# Patient Record
Sex: MALE | Race: OTHER | URBAN - METROPOLITAN AREA
[De-identification: names, ages, dates, MRNs, and addresses within clinical notes are randomized per-mention and may not be internally consistent; named-entity substitution may affect disease eponyms.]

---

## 2019-12-18 PROBLEM — Z00.00 ENCOUNTER FOR PREVENTIVE HEALTH EXAMINATION: Status: ACTIVE | Noted: 2019-12-18

## 2024-03-08 ENCOUNTER — INPATIENT (INPATIENT)
Facility: HOSPITAL | Age: 74
LOS: 3 days | Discharge: HOME | DRG: 723 | End: 2024-03-12
Attending: HOSPITALIST | Admitting: INTERNAL MEDICINE
Payer: MEDICARE

## 2024-03-08 VITALS
OXYGEN SATURATION: 99 % | HEIGHT: 67 IN | WEIGHT: 147.05 LBS | HEART RATE: 75 BPM | RESPIRATION RATE: 18 BRPM | DIASTOLIC BLOOD PRESSURE: 87 MMHG | TEMPERATURE: 98 F | SYSTOLIC BLOOD PRESSURE: 181 MMHG

## 2024-03-08 PROCEDURE — 74177 CT ABD & PELVIS W/CONTRAST: CPT | Mod: 26,MC

## 2024-03-08 PROCEDURE — 99285 EMERGENCY DEPT VISIT HI MDM: CPT

## 2024-03-08 RX ORDER — SODIUM CHLORIDE 9 MG/ML
1000 INJECTION INTRAMUSCULAR; INTRAVENOUS; SUBCUTANEOUS ONCE
Refills: 0 | Status: COMPLETED | OUTPATIENT
Start: 2024-03-08 | End: 2024-03-08

## 2024-03-08 RX ADMIN — SODIUM CHLORIDE 1000 MILLILITER(S): 9 INJECTION INTRAMUSCULAR; INTRAVENOUS; SUBCUTANEOUS at 23:14

## 2024-03-08 NOTE — ED ADULT NURSE NOTE - NSFALLUNIVINTERV_ED_ALL_ED
Bed/Stretcher in lowest position, wheels locked, appropriate side rails in place/Call bell, personal items and telephone in reach/Instruct patient to call for assistance before getting out of bed/chair/stretcher/Non-slip footwear applied when patient is off stretcher/Nettie to call system/Physically safe environment - no spills, clutter or unnecessary equipment/Purposeful proactive rounding/Room/bathroom lighting operational, light cord in reach

## 2024-03-09 DIAGNOSIS — C61 MALIGNANT NEOPLASM OF PROSTATE: ICD-10-CM

## 2024-03-09 LAB
ALBUMIN SERPL ELPH-MCNC: 3.9 G/DL — SIGNIFICANT CHANGE UP (ref 3.5–5.2)
ALBUMIN SERPL ELPH-MCNC: 4.5 G/DL — SIGNIFICANT CHANGE UP (ref 3.5–5.2)
ALP SERPL-CCNC: 565 U/L — HIGH (ref 30–115)
ALP SERPL-CCNC: 626 U/L — HIGH (ref 30–115)
ALT FLD-CCNC: 17 U/L — SIGNIFICANT CHANGE UP (ref 0–41)
ALT FLD-CCNC: 22 U/L — SIGNIFICANT CHANGE UP (ref 0–41)
ANION GAP SERPL CALC-SCNC: 12 MMOL/L — SIGNIFICANT CHANGE UP (ref 7–14)
ANION GAP SERPL CALC-SCNC: 16 MMOL/L — HIGH (ref 7–14)
APPEARANCE UR: CLEAR — SIGNIFICANT CHANGE UP
AST SERPL-CCNC: 17 U/L — SIGNIFICANT CHANGE UP (ref 0–41)
AST SERPL-CCNC: 21 U/L — SIGNIFICANT CHANGE UP (ref 0–41)
BASOPHILS # BLD AUTO: 0.03 K/UL — SIGNIFICANT CHANGE UP (ref 0–0.2)
BASOPHILS # BLD AUTO: 0.05 K/UL — SIGNIFICANT CHANGE UP (ref 0–0.2)
BASOPHILS NFR BLD AUTO: 0.2 % — SIGNIFICANT CHANGE UP (ref 0–1)
BASOPHILS NFR BLD AUTO: 0.3 % — SIGNIFICANT CHANGE UP (ref 0–1)
BILIRUB SERPL-MCNC: 0.6 MG/DL — SIGNIFICANT CHANGE UP (ref 0.2–1.2)
BILIRUB SERPL-MCNC: 0.7 MG/DL — SIGNIFICANT CHANGE UP (ref 0.2–1.2)
BILIRUB UR-MCNC: NEGATIVE — SIGNIFICANT CHANGE UP
BUN SERPL-MCNC: 29 MG/DL — HIGH (ref 10–20)
BUN SERPL-MCNC: 30 MG/DL — HIGH (ref 10–20)
CALCIUM SERPL-MCNC: 8.6 MG/DL — SIGNIFICANT CHANGE UP (ref 8.4–10.4)
CALCIUM SERPL-MCNC: 9 MG/DL — SIGNIFICANT CHANGE UP (ref 8.4–10.5)
CHLORIDE SERPL-SCNC: 104 MMOL/L — SIGNIFICANT CHANGE UP (ref 98–110)
CHLORIDE SERPL-SCNC: 108 MMOL/L — SIGNIFICANT CHANGE UP (ref 98–110)
CO2 SERPL-SCNC: 17 MMOL/L — SIGNIFICANT CHANGE UP (ref 17–32)
CO2 SERPL-SCNC: 19 MMOL/L — SIGNIFICANT CHANGE UP (ref 17–32)
COLOR SPEC: YELLOW — SIGNIFICANT CHANGE UP
CREAT SERPL-MCNC: 1.9 MG/DL — HIGH (ref 0.7–1.5)
CREAT SERPL-MCNC: 2.2 MG/DL — HIGH (ref 0.7–1.5)
DIFF PNL FLD: NEGATIVE — SIGNIFICANT CHANGE UP
EGFR: 31 ML/MIN/1.73M2 — LOW
EGFR: 37 ML/MIN/1.73M2 — LOW
EOSINOPHIL # BLD AUTO: 0 K/UL — SIGNIFICANT CHANGE UP (ref 0–0.7)
EOSINOPHIL # BLD AUTO: 0.01 K/UL — SIGNIFICANT CHANGE UP (ref 0–0.7)
EOSINOPHIL NFR BLD AUTO: 0 % — SIGNIFICANT CHANGE UP (ref 0–8)
EOSINOPHIL NFR BLD AUTO: 0.1 % — SIGNIFICANT CHANGE UP (ref 0–8)
GLUCOSE SERPL-MCNC: 106 MG/DL — HIGH (ref 70–99)
GLUCOSE SERPL-MCNC: 123 MG/DL — HIGH (ref 70–99)
GLUCOSE UR QL: NEGATIVE MG/DL — SIGNIFICANT CHANGE UP
HCT VFR BLD CALC: 31.2 % — LOW (ref 42–52)
HCT VFR BLD CALC: 32.9 % — LOW (ref 42–52)
HGB BLD-MCNC: 10.6 G/DL — LOW (ref 14–18)
HGB BLD-MCNC: 11.2 G/DL — LOW (ref 14–18)
IMM GRANULOCYTES NFR BLD AUTO: 0.8 % — HIGH (ref 0.1–0.3)
IMM GRANULOCYTES NFR BLD AUTO: 1 % — HIGH (ref 0.1–0.3)
KETONES UR-MCNC: NEGATIVE MG/DL — SIGNIFICANT CHANGE UP
LEUKOCYTE ESTERASE UR-ACNC: NEGATIVE — SIGNIFICANT CHANGE UP
LIDOCAIN IGE QN: 18 U/L — SIGNIFICANT CHANGE UP (ref 7–60)
LYMPHOCYTES # BLD AUTO: 1.05 K/UL — LOW (ref 1.2–3.4)
LYMPHOCYTES # BLD AUTO: 1.06 K/UL — LOW (ref 1.2–3.4)
LYMPHOCYTES # BLD AUTO: 6.8 % — LOW (ref 20.5–51.1)
LYMPHOCYTES # BLD AUTO: 8.1 % — LOW (ref 20.5–51.1)
MAGNESIUM SERPL-MCNC: 2.1 MG/DL — SIGNIFICANT CHANGE UP (ref 1.8–2.4)
MCHC RBC-ENTMCNC: 30.3 PG — SIGNIFICANT CHANGE UP (ref 27–31)
MCHC RBC-ENTMCNC: 30.6 PG — SIGNIFICANT CHANGE UP (ref 27–31)
MCHC RBC-ENTMCNC: 34 G/DL — SIGNIFICANT CHANGE UP (ref 32–37)
MCHC RBC-ENTMCNC: 34 G/DL — SIGNIFICANT CHANGE UP (ref 32–37)
MCV RBC AUTO: 88.9 FL — SIGNIFICANT CHANGE UP (ref 80–94)
MCV RBC AUTO: 90.2 FL — SIGNIFICANT CHANGE UP (ref 80–94)
MONOCYTES # BLD AUTO: 0.92 K/UL — HIGH (ref 0.1–0.6)
MONOCYTES # BLD AUTO: 1.04 K/UL — HIGH (ref 0.1–0.6)
MONOCYTES NFR BLD AUTO: 6 % — SIGNIFICANT CHANGE UP (ref 1.7–9.3)
MONOCYTES NFR BLD AUTO: 7.9 % — SIGNIFICANT CHANGE UP (ref 1.7–9.3)
NEUTROPHILS # BLD AUTO: 10.87 K/UL — HIGH (ref 1.4–6.5)
NEUTROPHILS # BLD AUTO: 13.17 K/UL — HIGH (ref 1.4–6.5)
NEUTROPHILS NFR BLD AUTO: 82.9 % — HIGH (ref 42.2–75.2)
NEUTROPHILS NFR BLD AUTO: 85.9 % — HIGH (ref 42.2–75.2)
NITRITE UR-MCNC: NEGATIVE — SIGNIFICANT CHANGE UP
NRBC # BLD: 0 /100 WBCS — SIGNIFICANT CHANGE UP (ref 0–0)
NRBC # BLD: 0 /100 WBCS — SIGNIFICANT CHANGE UP (ref 0–0)
PH UR: 5.5 — SIGNIFICANT CHANGE UP (ref 5–8)
PHOSPHATE SERPL-MCNC: 4.7 MG/DL — SIGNIFICANT CHANGE UP (ref 2.1–4.9)
PLATELET # BLD AUTO: 213 K/UL — SIGNIFICANT CHANGE UP (ref 130–400)
PLATELET # BLD AUTO: 262 K/UL — SIGNIFICANT CHANGE UP (ref 130–400)
PMV BLD: 10.3 FL — SIGNIFICANT CHANGE UP (ref 7.4–10.4)
PMV BLD: 9.8 FL — SIGNIFICANT CHANGE UP (ref 7.4–10.4)
POTASSIUM SERPL-MCNC: 4.1 MMOL/L — SIGNIFICANT CHANGE UP (ref 3.5–5)
POTASSIUM SERPL-MCNC: 4.5 MMOL/L — SIGNIFICANT CHANGE UP (ref 3.5–5)
POTASSIUM SERPL-SCNC: 4.1 MMOL/L — SIGNIFICANT CHANGE UP (ref 3.5–5)
POTASSIUM SERPL-SCNC: 4.5 MMOL/L — SIGNIFICANT CHANGE UP (ref 3.5–5)
PROT SERPL-MCNC: 6.1 G/DL — SIGNIFICANT CHANGE UP (ref 6–8)
PROT SERPL-MCNC: 7.2 G/DL — SIGNIFICANT CHANGE UP (ref 6–8)
PROT UR-MCNC: SIGNIFICANT CHANGE UP MG/DL
PSA FREE FLD-MCNC: 2.38 NG/ML — SIGNIFICANT CHANGE UP
PSA FREE FLD-MCNC: 24 % — SIGNIFICANT CHANGE UP
PSA SERPL-MCNC: 9.73 NG/ML — HIGH (ref 0–4)
RBC # BLD: 3.46 M/UL — LOW (ref 4.7–6.1)
RBC # BLD: 3.7 M/UL — LOW (ref 4.7–6.1)
RBC # FLD: 12.6 % — SIGNIFICANT CHANGE UP (ref 11.5–14.5)
RBC # FLD: 12.8 % — SIGNIFICANT CHANGE UP (ref 11.5–14.5)
SODIUM SERPL-SCNC: 137 MMOL/L — SIGNIFICANT CHANGE UP (ref 135–146)
SODIUM SERPL-SCNC: 139 MMOL/L — SIGNIFICANT CHANGE UP (ref 135–146)
SP GR SPEC: 1.02 — SIGNIFICANT CHANGE UP (ref 1–1.03)
UROBILINOGEN FLD QL: 0.2 MG/DL — SIGNIFICANT CHANGE UP (ref 0.2–1)
WBC # BLD: 13.11 K/UL — HIGH (ref 4.8–10.8)
WBC # BLD: 15.34 K/UL — HIGH (ref 4.8–10.8)
WBC # FLD AUTO: 13.11 K/UL — HIGH (ref 4.8–10.8)
WBC # FLD AUTO: 15.34 K/UL — HIGH (ref 4.8–10.8)

## 2024-03-09 PROCEDURE — 76775 US EXAM ABDO BACK WALL LIM: CPT

## 2024-03-09 PROCEDURE — 86901 BLOOD TYPING SEROLOGIC RH(D): CPT

## 2024-03-09 PROCEDURE — 99497 ADVNCD CARE PLAN 30 MIN: CPT | Mod: 25

## 2024-03-09 PROCEDURE — 76775 US EXAM ABDO BACK WALL LIM: CPT | Mod: 26

## 2024-03-09 PROCEDURE — 97161 PT EVAL LOW COMPLEX 20 MIN: CPT | Mod: GP

## 2024-03-09 PROCEDURE — 84100 ASSAY OF PHOSPHORUS: CPT

## 2024-03-09 PROCEDURE — 78803 RP LOCLZJ TUM SPECT 1 AREA: CPT

## 2024-03-09 PROCEDURE — 85610 PROTHROMBIN TIME: CPT

## 2024-03-09 PROCEDURE — 85730 THROMBOPLASTIN TIME PARTIAL: CPT

## 2024-03-09 PROCEDURE — 97110 THERAPEUTIC EXERCISES: CPT | Mod: GP

## 2024-03-09 PROCEDURE — 85025 COMPLETE CBC W/AUTO DIFF WBC: CPT

## 2024-03-09 PROCEDURE — 76770 US EXAM ABDO BACK WALL COMP: CPT

## 2024-03-09 PROCEDURE — 99222 1ST HOSP IP/OBS MODERATE 55: CPT | Mod: 25

## 2024-03-09 PROCEDURE — 99223 1ST HOSP IP/OBS HIGH 75: CPT

## 2024-03-09 PROCEDURE — 36415 COLL VENOUS BLD VENIPUNCTURE: CPT

## 2024-03-09 PROCEDURE — 86803 HEPATITIS C AB TEST: CPT

## 2024-03-09 PROCEDURE — 86850 RBC ANTIBODY SCREEN: CPT

## 2024-03-09 PROCEDURE — A9503: CPT

## 2024-03-09 PROCEDURE — 84154 ASSAY OF PSA FREE: CPT

## 2024-03-09 PROCEDURE — 78306 BONE IMAGING WHOLE BODY: CPT | Mod: MC

## 2024-03-09 PROCEDURE — 86900 BLOOD TYPING SEROLOGIC ABO: CPT

## 2024-03-09 PROCEDURE — 84153 ASSAY OF PSA TOTAL: CPT

## 2024-03-09 PROCEDURE — 80053 COMPREHEN METABOLIC PANEL: CPT

## 2024-03-09 PROCEDURE — 83735 ASSAY OF MAGNESIUM: CPT

## 2024-03-09 PROCEDURE — 97116 GAIT TRAINING THERAPY: CPT | Mod: GP

## 2024-03-09 RX ORDER — ACETAMINOPHEN 500 MG
650 TABLET ORAL EVERY 6 HOURS
Refills: 0 | Status: DISCONTINUED | OUTPATIENT
Start: 2024-03-09 | End: 2024-03-12

## 2024-03-09 RX ORDER — ONDANSETRON 8 MG/1
4 TABLET, FILM COATED ORAL EVERY 8 HOURS
Refills: 0 | Status: DISCONTINUED | OUTPATIENT
Start: 2024-03-09 | End: 2024-03-12

## 2024-03-09 RX ORDER — PANTOPRAZOLE SODIUM 20 MG/1
40 TABLET, DELAYED RELEASE ORAL
Refills: 0 | Status: DISCONTINUED | OUTPATIENT
Start: 2024-03-09 | End: 2024-03-12

## 2024-03-09 RX ORDER — HEPARIN SODIUM 5000 [USP'U]/ML
5000 INJECTION INTRAVENOUS; SUBCUTANEOUS EVERY 12 HOURS
Refills: 0 | Status: DISCONTINUED | OUTPATIENT
Start: 2024-03-09 | End: 2024-03-10

## 2024-03-09 RX ORDER — SENNA PLUS 8.6 MG/1
2 TABLET ORAL AT BEDTIME
Refills: 0 | Status: DISCONTINUED | OUTPATIENT
Start: 2024-03-09 | End: 2024-03-12

## 2024-03-09 RX ORDER — POLYETHYLENE GLYCOL 3350 17 G/17G
17 POWDER, FOR SOLUTION ORAL
Refills: 0 | Status: DISCONTINUED | OUTPATIENT
Start: 2024-03-09 | End: 2024-03-12

## 2024-03-09 RX ORDER — LACTULOSE 10 G/15ML
20 SOLUTION ORAL
Refills: 0 | Status: COMPLETED | OUTPATIENT
Start: 2024-03-09 | End: 2024-03-09

## 2024-03-09 RX ORDER — ATORVASTATIN CALCIUM 80 MG/1
20 TABLET, FILM COATED ORAL AT BEDTIME
Refills: 0 | Status: DISCONTINUED | OUTPATIENT
Start: 2024-03-09 | End: 2024-03-12

## 2024-03-09 RX ORDER — FINASTERIDE 5 MG/1
5 TABLET, FILM COATED ORAL DAILY
Refills: 0 | Status: DISCONTINUED | OUTPATIENT
Start: 2024-03-09 | End: 2024-03-12

## 2024-03-09 RX ORDER — KETOROLAC TROMETHAMINE 30 MG/ML
15 SYRINGE (ML) INJECTION ONCE
Refills: 0 | Status: DISCONTINUED | OUTPATIENT
Start: 2024-03-09 | End: 2024-03-09

## 2024-03-09 RX ORDER — LANOLIN ALCOHOL/MO/W.PET/CERES
3 CREAM (GRAM) TOPICAL AT BEDTIME
Refills: 0 | Status: DISCONTINUED | OUTPATIENT
Start: 2024-03-09 | End: 2024-03-12

## 2024-03-09 RX ORDER — LOSARTAN POTASSIUM 100 MG/1
100 TABLET, FILM COATED ORAL DAILY
Refills: 0 | Status: DISCONTINUED | OUTPATIENT
Start: 2024-03-09 | End: 2024-03-11

## 2024-03-09 RX ORDER — SODIUM CHLORIDE 9 MG/ML
1000 INJECTION, SOLUTION INTRAVENOUS
Refills: 0 | Status: DISCONTINUED | OUTPATIENT
Start: 2024-03-09 | End: 2024-03-12

## 2024-03-09 RX ADMIN — HEPARIN SODIUM 5000 UNIT(S): 5000 INJECTION INTRAVENOUS; SUBCUTANEOUS at 17:21

## 2024-03-09 RX ADMIN — ATORVASTATIN CALCIUM 20 MILLIGRAM(S): 80 TABLET, FILM COATED ORAL at 21:50

## 2024-03-09 RX ADMIN — Medication 15 MILLIGRAM(S): at 02:52

## 2024-03-09 RX ADMIN — Medication 20 MILLIGRAM(S): at 02:51

## 2024-03-09 RX ADMIN — SODIUM CHLORIDE 75 MILLILITER(S): 9 INJECTION, SOLUTION INTRAVENOUS at 11:41

## 2024-03-09 RX ADMIN — FINASTERIDE 5 MILLIGRAM(S): 5 TABLET, FILM COATED ORAL at 11:38

## 2024-03-09 NOTE — ED PROVIDER NOTE - CLINICAL SUMMARY MEDICAL DECISION MAKING FREE TEXT BOX
Patient evaluated for abdominal pain and constipation, found to have prostate mass on CT imaging, labs remarkable for JIGNESH, patient admitted for further evaluation of treatment of new mass in setting JIGNESH.

## 2024-03-09 NOTE — ED PROVIDER NOTE - PHYSICAL EXAMINATION
CONST: well appearing for age  HEAD:  normocephalic, atraumatic  EYES:  conjunctivae without injection, drainage or discharge  ENMT:   nasal mucosa moist; mouth moist without ulcerations or lesions; throat moist without erythema, exudate, ulcerations or lesions  NECK:  supple  CARDIAC:  regular rate and rhythm, normal S1 and S2, no murmurs, rubs or gallops  RESP:  respiratory rate and effort appear normal for age; lungs are clear to auscultation bilaterally; no rales or wheezes  ABDOMEN:  soft, mild lower abdominal tenderness  MUSCULOSKELETAL/NEURO:  awake and alert, HART, normal movement, normal tone  SKIN:  normal skin color for age and race, well-perfused; warm and dry

## 2024-03-09 NOTE — PROGRESS NOTE ADULT - SUBJECTIVE AND OBJECTIVE BOX
INTERVENTIONAL RADIOLOGY CONSULT:     Procedure Requested:  Prostate Biopsy    HPI:  Pt is a 72yo Male with pmh of BPH (on finasteride), HTN, HLD presented to the ER with lower abdominal pain and constipation for the past few days. The patient follows with Urologist Dr. Perez and private Oncologist in [place]. The patient mentioned that he is not aware of the prostate cancer diagnosis and was being managed for BPH by urology. He mentioned that he had a bladder stone removal in 2021 and had colonoscopy recently in Feb which showed 5 polyps. The patient has increase in urinary frequency and nocturia but denies any dysuria and attributes it to Finasteride. He has FHx ( 2 brothers) of prostate cancer. The patient mentioned that he would like to see his private oncologist rather than get treatment with oncology team here. The patient has been making urine and Hensley was in ED as requested by urology and has mild hematuria (likely due to traumatic Hensley).    Pt denies history of smoking. Pt denies fever, chills, N/V, suprapubic pain, flank pain, dysuria,    Vital Signs Last 24 Hrs  T(C): 36.4 (09 Mar 2024 07:38), Max: 36.4 (08 Mar 2024 21:56)  T(F): 97.6 (09 Mar 2024 07:38), Max: 97.6 (08 Mar 2024 21:56)  HR: 76 (09 Mar 2024 07:38) (72 - 78)  BP: 127/71 (09 Mar 2024 07:38) (125/59 - 181/87)--  RR: 18 (09 Mar 2024 07:38) (18 - 18)  SpO2: 99% (09 Mar 2024 07:38) (99% - 99%)  O2 Parameters below as of 09 Mar 2024 07:38  Patient On (Oxygen Delivery Method): room air      (09 Mar 2024 09:32)      PAST MEDICAL & SURGICAL HISTORY:  Prostate cancer      No significant past surgical history      MEDICATIONS  (STANDING):  atorvastatin 20 milliGRAM(s) Oral at bedtime  finasteride 5 milliGRAM(s) Oral daily  heparin   Injectable 5000 Unit(s) SubCutaneous every 12 hours  lactated ringers. 1000 milliLiter(s) (75 mL/Hr) IV Continuous <Continuous>  losartan 100 milliGRAM(s) Oral daily  pantoprazole    Tablet 40 milliGRAM(s) Oral before breakfast  polyethylene glycol 3350 17 Gram(s) Oral two times a day  senna 2 Tablet(s) Oral at bedtime    MEDICATIONS  (PRN):  acetaminophen     Tablet .. 650 milliGRAM(s) Oral every 6 hours PRN Temp greater or equal to 38C (100.4F), Mild Pain (1 - 3)  aluminum hydroxide/magnesium hydroxide/simethicone Suspension 30 milliLiter(s) Oral every 4 hours PRN Dyspepsia  melatonin 3 milliGRAM(s) Oral at bedtime PRN Insomnia  ondansetron Injectable 4 milliGRAM(s) IV Push every 8 hours PRN Nausea and/or Vomiting      Allergies    Allergy Status Unknown    Intolerances        Social History:   Smoking: Yes [ ]  No [ ]   ______pk yrs  ETOH  Yes [ ]  No [ ]  Social [ ]  DRUGS:  Yes [ ]  No [ ]  if so what______________    FAMILY HISTORY:  No pertinent family history in first degree relatives        Physical Exam:   Vital Signs Last 24 Hrs  T(C): 36.9 (09 Mar 2024 15:41), Max: 36.9 (09 Mar 2024 15:41)  T(F): 98.4 (09 Mar 2024 15:41), Max: 98.4 (09 Mar 2024 15:41)  HR: 92 (09 Mar 2024 15:41) (72 - 92)  BP: 100/56 (09 Mar 2024 15:41) (100/56 - 181/87)  BP(mean): 78 (09 Mar 2024 15:41) (78 - 78)  RR: 18 (09 Mar 2024 15:41) (18 - 18)  SpO2: 97% (09 Mar 2024 15:41) (97% - 99%)    Parameters below as of 09 Mar 2024 07:38  Patient On (Oxygen Delivery Method): room air      General:     Lungs:    Cardiovascular:     Abdomen:    Extremities:    Musculoskeletal:    Neuro/Psych:        Labs:                         10.6   13.11 )-----------( 213      ( 09 Mar 2024 07:04 )             31.2     03-09    139  |  108  |  30<H>  ----------------------------<  106<H>  4.5   |  19  |  2.2<H>    Ca    8.6      09 Mar 2024 07:04  Phos  4.7     03-09  Mg     2.1     03-09    TPro  6.1  /  Alb  3.9  /  TBili  0.6  /  DBili  x   /  AST  17  /  ALT  17  /  AlkPhos  565<H>  03-09      Pertinent labs:                      10.6   13.11 )-----------( 213      ( 09 Mar 2024 07:04 )             31.2       03-09    139  |  108  |  30<H>  ----------------------------<  106<H>  4.5   |  19  |  2.2<H>    Ca    8.6      09 Mar 2024 07:04  Phos  4.7     03-09  Mg     2.1     03-09    TPro  6.1  /  Alb  3.9  /  TBili  0.6  /  DBili  x   /  AST  17  /  ALT  17  /  AlkPhos  565<H>  03-09        Radiology & Additional Studies:  CT A/P (3/8/2024)     Radiology imaging reviewed.       ASSESSMENT/ PLAN:   72 yo male with above history.  IR consulted by Medicine team for prostate biopsy.  Imaging  results as follows:    IMPRESSION:    1. Left lateral urinary bladder wall masslike thickening, contiguous with prostate, with extravesicular tumor infiltration. Mass at region of left UVJ, with resultant moderate left hydroureteronephrosis. Urinary bladder distended. Right mild hydronephrosis or fullness. BPH with median lobe hypertrophy. 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 Findings compatible with metastatic neoplasm; prostate cancer invading bladder favored over primary urinary bladder neoplasm given suspicious osteoblastic metastatic bone disease.. Correlation with PSA recommended.    2. Suspicious pelvic lymphadenopathy, catalogued above.    3. Mild colonic stool burden. No bowel obstruction. Relation of prostate gland and rectum limited by modality.    --- End of Report ---      Case d/w Jenifer Wellington via telephone early this afternoon via Teams telephone.  Please reconsult urology team if prostate biopsy is desired.  The pre-sacral and iliac lymphadenopathy described is not amenable to percutaneous biopsy due to lack of safe percutaneous window.  Alternatively, if one of the sclerotic bone lesions, ie) the left sacrum is desired, IR could potentially biopsy one of these lesions.        Thank you for the courtesy of this consult, please call [phone] with any further questions.

## 2024-03-09 NOTE — H&P ADULT - LOCATION OF DISCUSSION
[Dear  ___] : Dear  [unfilled], [Attached please find my note.] : Attached please find my note. Face to face

## 2024-03-09 NOTE — ED PROVIDER NOTE - CARE PLAN
Principal Discharge DX:	Primary prostate malignancy  Secondary Diagnosis:	JIGNESH (acute kidney injury)   1

## 2024-03-09 NOTE — ED PROVIDER NOTE - ATTENDING CONTRIBUTION TO CARE
73-year-old male with PMH HTN, HLD, BPH presents for evaluation of abdominal pain and constipation for several days.  + urinary frequency. Denies any dysuria, hematuria or flank pain.  No fevers or chills, vomiting or diarrhea.    VITAL SIGNS: noted  CONSTITUTIONAL: Well-developed; well-nourished; in no acute distress  HEAD: Normocephalic; atraumatic  EYES: PERRL, EOM intact; conjunctiva and sclera clear  ENT: No nasal discharge; airway clear. MMM  NECK: Supple; non tender.    CARD: S1, S2 normal; no murmurs, gallops, or rubs. Regular rate and rhythm  RESP: CTAB/L, no wheezes, rales or rhonchi  ABD: Normal bowel sounds; soft; non-distended; mild abdominal tenderness, no rebound or guarding; no CVA tenderness  EXT: Normal ROM. No calf tenderness or edema. Distal pulses intact  NEURO: Alert, oriented. Grossly unremarkable. No focal deficits  SKIN: Skin exam is warm and dry

## 2024-03-09 NOTE — H&P ADULT - ASSESSMENT
Left lateral urinary bladder wall masslike thickening, contiguous with   prostate, with extravesicular tumor infiltration. Mass at region of left   UVJ, with resultant moderate left hydroureteronephrosis. Urinary bladder   distended. Right mild hydronephrosis or fullness. BPH with median lobe   hypertrophy. Additional findings suspicious for osteoblastic metastatic   bone disease. Findings compatible with metastatic neoplasm; prostate   cancer invading bladder favored over primary urinary bladder neoplasm   given suspicious osteoblastic metastatic bone disease.. Correlation with   PSA recommended.    2. Suspicious pelvic lymphadenopathy, catalogued above.    3. Mild colonic stool burden. No bowel obstruction. Relation of prostate   gland and rectum limited by modality.        #DVT PPx- heparin  #GI PPx-protonix  #Diet- DASH  #CHG  #Activity- AAT  #Dispo- Floor     Pt is a 74yo Male with pmh of BPH (on finasteride), HTN, HLD presented to the ER with lower abdominal pain and constipation for the past few days. The patient follows with Urologist Dr. Perez and private Oncologist in [place]. The patient mentioned that he is not aware of the prostate cancer diagnosis and was being managed for BPH by urology. He mentioned that he had a bladder stone removal in 2021 and had colonoscopy recently in Feb which showed 5 polyps. The patient has increase in urinary frequency and nocturia but denies any dysuria and attributes it to Finasteride. He has FHx ( 2 brothers) of prostate cancer. The patient mentioned that he would like to see his private oncologist rather than get treatment with oncology team here. The patient has been making urine and Hensley was in ED as requested by urology and has mild hematuria (likely due to traumatic Hensley).      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 Larry Perez  - urology recs appreciated  - Hensley in place, 450 cc UO with hematuria  - monitor for creatinine, 1.9>>2.2  - Billie phos elevated 526 due to bone mets  - Hg 10, keep type and screen active  - monitor for now if renal function going down, consider IR for JJ stent  - CT A/P: Left lateral urinary bladder wall masslike thickening, contiguous with prostate, with extravesicular tumor infiltration. Mass at region of left UVJ, with resultant moderate left hydroureteronephrosis. Urinary bladder distended. Right mild hydronephrosis or fullness. BPH with median lobe hypertrophy. 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 Findings compatible with metastatic neoplasm; prostate   cancer invading bladder favored over primary urinary bladder neoplasm given suspicious osteoblastic metastatic bone disease.Suspicious pelvic lymphadenopathy, catalogued above. Mild colonic stool burden. No bowel obstruction. Relation of prostate gland and rectum limited by modality.    # Constipation  - c/w senna, miralax  - will give lactulose 20 for 3 doses till BM    # HTN  - olmesartan 40 mg daily    # BPH  - finasteride 5 mg daily    # HLD  - c/w lipitor    #DVT PPx- heparin  #GI PPx- Protonix  #Diet- DASH  #CHG  #Activity- AAT  #Dispo- Floor     Pt is a 74yo Male with pmh of BPH (on finasteride), HTN, HLD presented to the ER with lower abdominal pain and constipation for the past few days. The patient follows with Urologist Dr. Perez and private Oncologist in [place]. The patient mentioned that he is not aware of the prostate cancer diagnosis and was being managed for BPH by urology. He mentioned that he had a bladder stone removal in 2021 and had colonoscopy recently in Feb which showed 5 polyps. The patient has increase in urinary frequency and nocturia but denies any dysuria and attributes it to Finasteride. He has FHx ( 2 brothers) of prostate cancer. The patient mentioned that he would like to see his private oncologist rather than get treatment with oncology team here. The patient has been making urine and Hensley was in ED as requested by urology and has mild hematuria (likely due to traumatic Hensley).      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 Larry Perez  - urology recs appreciated, follow up IR for bx   - Hensley in place, 450 cc UO with hematuria  - monitor for creatinine, 1.9>>2.2  - Billie phos elevated 526 due to bone mets  - Hg 10, keep type and screen active  - monitor for now if renal function going down, consider IR for JJ stent  - CT A/P: Left lateral urinary bladder wall masslike thickening, contiguous with prostate, with extravesicular tumor infiltration. Mass at region of left UVJ, with resultant moderate left hydroureteronephrosis. Urinary bladder distended. Right mild hydronephrosis or fullness. BPH with median lobe hypertrophy. 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 Findings compatible with metastatic neoplasm; prostate   cancer invading bladder favored over primary urinary bladder neoplasm given suspicious osteoblastic metastatic bone disease.Suspicious pelvic lymphadenopathy, catalogued above. Mild colonic stool burden. No bowel obstruction. Relation of prostate gland and rectum limited by modality.  -renal consult if worsening  -IVF   - bone scam    # Constipation  - c/w senna, miralax  - will give lactulose 20 for 3 doses till BM    # HTN  - olmesartan 40 mg daily    # BPH  - finasteride 5 mg daily    # HLD  - c/w lipitor    #DVT PPx- heparin  #GI PPx- Protonix  #Diet- DASH  #CHG  #Activity- AAT  #Dispo- Floor

## 2024-03-09 NOTE — H&P ADULT - NSHPREVIEWOFSYSTEMS_GEN_ALL_CORE
CONSTITUTIONAL: No weakness, fevers or chills  EYES/ENT: No visual changes;  No vertigo or throat pain   NECK: No pain or stiffness  RESPIRATORY: No cough, wheezing, hemoptysis; No shortness of breath  CARDIOVASCULAR: No chest pain or palpitations  GASTROINTESTINAL: No abdominal or epigastric pain. No nausea, vomiting, or hematemesis; No diarrhea or constipation. No melena or hematochezia.  GENITOURINARY: No dysuria, frequency or hematuria  NEUROLOGICAL: No numbness or weakness  SKIN: No itching, rashes CONSTITUTIONAL: No weakness, fevers or chills  EYES/ENT: No visual changes;  No vertigo or throat pain   NECK: No pain or stiffness  RESPIRATORY: No cough, wheezing, hemoptysis; No shortness of breath  CARDIOVASCULAR: No chest pain or palpitations  GASTROINTESTINAL: No abdominal or epigastric pain. No nausea, vomiting, or hematemesis; No diarrhea or constipation. No melena or hematochezia.  GENITOURINARY: Hematuria, no dysuria,  increase in frequency  NEUROLOGICAL: No numbness or weakness  SKIN: No itching, rashes

## 2024-03-09 NOTE — ED PROVIDER NOTE - OBJECTIVE STATEMENT
Pt is a 72 y/o male with PMH of HTN, HLD and  BPH presenting for abdominal pain x few days. Pain is in lower abdomen and associated with constipation. Last BM was earlier today and was straining. Last normal BM was monday. Also reports urinary frequency. No fever, nausea, vomiting, dysuria or hematuria.

## 2024-03-09 NOTE — H&P ADULT - HISTORY OF PRESENT ILLNESS
Pt is a 72yo Male with pmh of BPH (on finasteride), HTN, HLD presented to the ER with lower abdominal pain and constipation. Pt seen and examined at bedside. Pt states he was been having increased difficulty having bowel movement, which prompted him to come to the ER. Pt states he follows with a private urologist Dr. Ana Juarez, who he last saw in November. Pt states he had bladder stone removed in 2021, but denies any other urologic procedures in the past. Pt states he noticed increased urinary frequency and nocturia lately even when taking his finasteride. Pt admits to family history of prostate cancer (his two brothers, and uncle). Pt denies history of smoking. Pt denies fever, chills, N/V, suprapubic pain, flank pain, dysuria, or hematuria.      Vital Signs Last 24 Hrs  T(C): 36.4 (09 Mar 2024 07:38), Max: 36.4 (08 Mar 2024 21:56)  T(F): 97.6 (09 Mar 2024 07:38), Max: 97.6 (08 Mar 2024 21:56)  HR: 76 (09 Mar 2024 07:38) (72 - 78)  BP: 127/71 (09 Mar 2024 07:38) (125/59 - 181/87)--  RR: 18 (09 Mar 2024 07:38) (18 - 18)  SpO2: 99% (09 Mar 2024 07:38) (99% - 99%)  O2 Parameters below as of 09 Mar 2024 07:38  Patient On (Oxygen Delivery Method): room air         Pt is a 74yo Male with pmh of BPH (on finasteride), HTN, HLD presented to the ER with lower abdominal pain and constipation for the past few days. The patient follows with Urologist Dr. Perez and private Oncologist in [place]. The patient mentioned that he is not aware of the prostate cancer diagnosis and was being managed for BPH by urology. He mentioned that he had a bladder stone removal in 2021 and had colonoscopy recently in Feb which showed 5 polyps. The patient has increase in urinary frequency and nocturia but denies any dysuria and attributes it to Finasteride. He has FHx ( 2 brothers) of prostate cancer. The patient mentioned that he would like to see his private oncologist rather than get treatment with oncology team here. The patient has been making urine and Hensley was in ED as requested by urology and has mild hematuria (likely due to traumatic Hensley).    Pt denies history of smoking. Pt denies fever, chills, N/V, suprapubic pain, flank pain, dysuria,     Vital Signs Last 24 Hrs  T(C): 36.4 (09 Mar 2024 07:38), Max: 36.4 (08 Mar 2024 21:56)  T(F): 97.6 (09 Mar 2024 07:38), Max: 97.6 (08 Mar 2024 21:56)  HR: 76 (09 Mar 2024 07:38) (72 - 78)  BP: 127/71 (09 Mar 2024 07:38) (125/59 - 181/87)--  RR: 18 (09 Mar 2024 07:38) (18 - 18)  SpO2: 99% (09 Mar 2024 07:38) (99% - 99%)  O2 Parameters below as of 09 Mar 2024 07:38  Patient On (Oxygen Delivery Method): room air

## 2024-03-09 NOTE — H&P ADULT - NSHPLABSRESULTS_GEN_ALL_CORE
10.6   13.11 )-----------( 213      ( 09 Mar 2024 07:04 )             31.2     03-09    139  |  108  |  30<H>  ----------------------------<  106<H>  4.5   |  19  |  2.2<H>    Ca    8.6      09 Mar 2024 07:04  Phos  4.7     03-09  Mg     2.1     03-09    TPro  6.1  /  Alb  3.9  /  TBili  0.6  /  DBili  x   /  AST  17  /  ALT  17  /  AlkPhos  565<H>  03-09          Urinalysis Basic - ( 09 Mar 2024 07:04 )    Color: x / Appearance: x / SG: x / pH: x  Gluc: 106 mg/dL / Ketone: x  / Bili: x / Urobili: x   Blood: x / Protein: x / Nitrite: x   Leuk Esterase: x / RBC: x / WBC x   Sq Epi: x / Non Sq Epi: x / Bacteria: x        Lactate Trend        CAPILLARY BLOOD GLUCOSE

## 2024-03-09 NOTE — CONSULT NOTE ADULT - NS ATTEND AMEND GEN_ALL_CORE FT
seen on March 9th  elevated PSA - 9  Biopsy of metastatic lesion  heme/on evaluation  acute kidney failure -- trend creatinine - keep thrasher in place for now    patient has urologist in [place] who has performed cysto recently, but will likely need new evaluation given findings on CT scan (bladder mass)

## 2024-03-09 NOTE — H&P ADULT - ATTENDING COMMENTS
HPI as above.  Interval history: Pt seen and examined at bedside. No cp or sob.   Vital Signs (24 Hrs):  T(C): 36.4 (03-09-24 @ 07:38), Max: 36.4 (03-08-24 @ 21:56)  HR: 76 (03-09-24 @ 07:38) (72 - 78)  BP: 127/71 (03-09-24 @ 07:38) (125/59 - 181/87)  RR: 18 (03-09-24 @ 07:38) (18 - 18)  SpO2: 99% (03-09-24 @ 07:38) (99% - 99%)  Wt(kg): --  Daily Height in cm: 170.18 (08 Mar 2024 21:56)    Daily     I&O's Summary    08 Mar 2024 07:01  -  09 Mar 2024 07:00  --------------------------------------------------------  IN: 0 mL / OUT: 1750 mL / NET: -1750 mL      PHYSICAL EXAM:  GENERAL: NAD  HEAD:  Atraumatic, Normocephalic  EYES: EOMI, PERRLA, conjunctiva and sclera clear  NECK: Supple, No JVD  CHEST/LUNG: Clear to auscultation bilaterally; No wheeze  HEART: Regular rate and rhythm; No murmurs, rubs, or gallops  ABDOMEN: Soft, Nontender, Nondistended; Bowel sounds present  EXTREMITIES:  2+ Peripheral Pulses, No clubbing, cyanosis, or edema  PSYCH: AAOx3  NEUROLOGY: non-focal  thrasher    Labs reviewed  Imaging reviewed independently and reviewed read      Plan as above, dw resident, edits made    #Progress Note Handoff  Pending (specify):    Family discussion: house staff updated pt family  Disposition:   Decision to admit the pt is based on acuity as above

## 2024-03-09 NOTE — ED PROVIDER NOTE - CONSIDERATION OF ADMISSION OBSERVATION
pt admitted for new JIGNESH/prostate mass diagnosed on labs and imaging Consideration of Admission/Observation

## 2024-03-09 NOTE — CONSULT NOTE ADULT - ASSESSMENT
74yo Male with pmh of BPH (on finasteride), HTN, HLD presented to the ER with lower abdominal pain and constipation    CT A&P w/ IV: Left lateral urinary bladder wall masslike thickening, contiguous with prostate, with extravesicular tumor infiltration. Mass at region of left UVJ, with resultant moderate left hydroureteronephrosis. Urinary bladder distended. Right mild hydronephrosis or fullness. BPH with median lobe hypertrophy. 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 Findings compatible with metastatic neoplasm; prostate cancer invading bladder favored over primary urinary bladder neoplasm given suspicious osteoblastic metastatic bone disease.. Correlation with PSA recommended. Suspicious pelvic lymphadenopathy, catalogued above. Mild colonic stool burden. No bowel obstruction. Relation of prostate gland and rectum limited by modality.    Plan:  ·	recommend thrasher placement, urinary bladder distended on CT and pt unable to empty bladder on exam  ·	trend Cr, currently 1.9   ·	repeat RBUS to assess hydro   ·	f/u PSA   ·	urine cytology   ·	f/u UA, urine cultures - tx accordingly to c&s  ·	address and tx constipation   ·	will discuss with attending     72yo Male with pmh of BPH (on finasteride), HTN, HLD presented to the ER with lower abdominal pain and constipation    CT A&P w/ IV: Left lateral urinary bladder wall masslike thickening, contiguous with prostate, with extravesicular tumor infiltration. Mass at region of left UVJ, with resultant moderate left hydroureteronephrosis. Urinary bladder distended. Right mild hydronephrosis or fullness. BPH with median lobe hypertrophy. 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 Findings compatible with metastatic neoplasm; prostate cancer invading bladder favored over primary urinary bladder neoplasm given suspicious osteoblastic metastatic bone disease.. Correlation with PSA recommended. Suspicious pelvic lymphadenopathy, catalogued above. Mild colonic stool burden. No bowel obstruction. Relation of prostate gland and rectum limited by modality.    Plan:  ·	recommend thrasher placement, urinary bladder distended on CT and pt unable to empty bladder on exam  ·	trend Cr, currently 1.9   ·	repeat RBUS to assess hydro   ·	f/u PSA   ·	urine cytology   ·	f/u UA, urine cultures - tx accordingly to c&s  ·	address and tx constipation   ·	will discuss with attending      ADDENDUM:   ·	Pt seen at bedside with Dr. Aragon during rounds and d/w covering medicine team   ·	Recommend IR evaluation for biopsy   ·	f/u PSA  ·	Recommend heme/onc evaluation   ·	Cont with thrasher for now  ·	Trend Cr    72yo Male with pmh of BPH (on finasteride), HTN, HLD presented to the ER with lower abdominal pain and constipation    CT A&P w/ IV: Left lateral urinary bladder wall masslike thickening, contiguous with prostate, with extravesicular tumor infiltration. Mass at region of left UVJ, with resultant moderate left hydroureteronephrosis. Urinary bladder distended. Right mild hydronephrosis or fullness. BPH with median lobe hypertrophy. 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 Findings compatible with metastatic neoplasm; prostate cancer invading bladder favored over primary urinary bladder neoplasm given suspicious osteoblastic metastatic bone disease.. Correlation with PSA recommended. Suspicious pelvic lymphadenopathy, catalogued above. Mild colonic stool burden. No bowel obstruction. Relation of prostate gland and rectum limited by modality.    Plan:  ·	recommend thrasher placement, urinary bladder distended on CT and pt unable to empty bladder on exam  ·	trend Cr, currently 1.9   ·	repeat RBUS to assess hydro   ·	f/u PSA   ·	urine cytology   ·	f/u UA, urine cultures - tx accordingly to c&s  ·	address and tx constipation   ·	will discuss with attending      ADDENDUM:   ·	Pt seen at bedside with Dr. Aragon during rounds and d/w covering medicine team   ·	Recommend IR evaluation for biopsy of metastatic lesion   ·	f/u PSA  ·	Recommend heme/onc evaluation   ·	Cont with thrasher for now  ·	Trend Cr

## 2024-03-10 LAB
ALBUMIN SERPL ELPH-MCNC: 3.5 G/DL — SIGNIFICANT CHANGE UP (ref 3.5–5.2)
ALP SERPL-CCNC: 508 U/L — HIGH (ref 30–115)
ALT FLD-CCNC: 14 U/L — SIGNIFICANT CHANGE UP (ref 0–41)
ANION GAP SERPL CALC-SCNC: 13 MMOL/L — SIGNIFICANT CHANGE UP (ref 7–14)
AST SERPL-CCNC: 15 U/L — SIGNIFICANT CHANGE UP (ref 0–41)
BASOPHILS # BLD AUTO: 0.06 K/UL — SIGNIFICANT CHANGE UP (ref 0–0.2)
BASOPHILS NFR BLD AUTO: 0.5 % — SIGNIFICANT CHANGE UP (ref 0–1)
BILIRUB SERPL-MCNC: 0.6 MG/DL — SIGNIFICANT CHANGE UP (ref 0.2–1.2)
BUN SERPL-MCNC: 41 MG/DL — HIGH (ref 10–20)
CALCIUM SERPL-MCNC: 8 MG/DL — LOW (ref 8.4–10.5)
CHLORIDE SERPL-SCNC: 111 MMOL/L — HIGH (ref 98–110)
CO2 SERPL-SCNC: 17 MMOL/L — SIGNIFICANT CHANGE UP (ref 17–32)
CREAT SERPL-MCNC: 2.1 MG/DL — HIGH (ref 0.7–1.5)
CULTURE RESULTS: SIGNIFICANT CHANGE UP
EGFR: 33 ML/MIN/1.73M2 — LOW
EOSINOPHIL # BLD AUTO: 0.21 K/UL — SIGNIFICANT CHANGE UP (ref 0–0.7)
EOSINOPHIL NFR BLD AUTO: 1.8 % — SIGNIFICANT CHANGE UP (ref 0–8)
GLUCOSE SERPL-MCNC: 91 MG/DL — SIGNIFICANT CHANGE UP (ref 70–99)
HCT VFR BLD CALC: 30.8 % — LOW (ref 42–52)
HGB BLD-MCNC: 10.3 G/DL — LOW (ref 14–18)
IMM GRANULOCYTES NFR BLD AUTO: 0.7 % — HIGH (ref 0.1–0.3)
LYMPHOCYTES # BLD AUTO: 1.41 K/UL — SIGNIFICANT CHANGE UP (ref 1.2–3.4)
LYMPHOCYTES # BLD AUTO: 12.3 % — LOW (ref 20.5–51.1)
MAGNESIUM SERPL-MCNC: 2.2 MG/DL — SIGNIFICANT CHANGE UP (ref 1.8–2.4)
MCHC RBC-ENTMCNC: 30.6 PG — SIGNIFICANT CHANGE UP (ref 27–31)
MCHC RBC-ENTMCNC: 33.4 G/DL — SIGNIFICANT CHANGE UP (ref 32–37)
MCV RBC AUTO: 91.4 FL — SIGNIFICANT CHANGE UP (ref 80–94)
MONOCYTES # BLD AUTO: 1.12 K/UL — HIGH (ref 0.1–0.6)
MONOCYTES NFR BLD AUTO: 9.8 % — HIGH (ref 1.7–9.3)
NEUTROPHILS # BLD AUTO: 8.55 K/UL — HIGH (ref 1.4–6.5)
NEUTROPHILS NFR BLD AUTO: 74.9 % — SIGNIFICANT CHANGE UP (ref 42.2–75.2)
NRBC # BLD: 0 /100 WBCS — SIGNIFICANT CHANGE UP (ref 0–0)
PLATELET # BLD AUTO: 208 K/UL — SIGNIFICANT CHANGE UP (ref 130–400)
PMV BLD: 10.2 FL — SIGNIFICANT CHANGE UP (ref 7.4–10.4)
POTASSIUM SERPL-MCNC: 4.3 MMOL/L — SIGNIFICANT CHANGE UP (ref 3.5–5)
POTASSIUM SERPL-SCNC: 4.3 MMOL/L — SIGNIFICANT CHANGE UP (ref 3.5–5)
PROT SERPL-MCNC: 5.7 G/DL — LOW (ref 6–8)
RBC # BLD: 3.37 M/UL — LOW (ref 4.7–6.1)
RBC # FLD: 13.3 % — SIGNIFICANT CHANGE UP (ref 11.5–14.5)
SODIUM SERPL-SCNC: 141 MMOL/L — SIGNIFICANT CHANGE UP (ref 135–146)
SPECIMEN SOURCE: SIGNIFICANT CHANGE UP
WBC # BLD: 11.43 K/UL — HIGH (ref 4.8–10.8)
WBC # FLD AUTO: 11.43 K/UL — HIGH (ref 4.8–10.8)

## 2024-03-10 PROCEDURE — 99232 SBSQ HOSP IP/OBS MODERATE 35: CPT

## 2024-03-10 PROCEDURE — 99223 1ST HOSP IP/OBS HIGH 75: CPT

## 2024-03-10 PROCEDURE — 99233 SBSQ HOSP IP/OBS HIGH 50: CPT

## 2024-03-10 RX ORDER — SODIUM BICARBONATE 1 MEQ/ML
650 SYRINGE (ML) INTRAVENOUS THREE TIMES A DAY
Refills: 0 | Status: DISCONTINUED | OUTPATIENT
Start: 2024-03-10 | End: 2024-03-12

## 2024-03-10 RX ORDER — TAMSULOSIN HYDROCHLORIDE 0.4 MG/1
0.4 CAPSULE ORAL AT BEDTIME
Refills: 0 | Status: DISCONTINUED | OUTPATIENT
Start: 2024-03-10 | End: 2024-03-12

## 2024-03-10 RX ADMIN — LOSARTAN POTASSIUM 100 MILLIGRAM(S): 100 TABLET, FILM COATED ORAL at 05:23

## 2024-03-10 RX ADMIN — PANTOPRAZOLE SODIUM 40 MILLIGRAM(S): 20 TABLET, DELAYED RELEASE ORAL at 05:23

## 2024-03-10 RX ADMIN — ATORVASTATIN CALCIUM 20 MILLIGRAM(S): 80 TABLET, FILM COATED ORAL at 21:07

## 2024-03-10 RX ADMIN — Medication 650 MILLIGRAM(S): at 14:08

## 2024-03-10 RX ADMIN — TAMSULOSIN HYDROCHLORIDE 0.4 MILLIGRAM(S): 0.4 CAPSULE ORAL at 21:07

## 2024-03-10 RX ADMIN — Medication 650 MILLIGRAM(S): at 21:07

## 2024-03-10 RX ADMIN — POLYETHYLENE GLYCOL 3350 17 GRAM(S): 17 POWDER, FOR SOLUTION ORAL at 17:23

## 2024-03-10 RX ADMIN — FINASTERIDE 5 MILLIGRAM(S): 5 TABLET, FILM COATED ORAL at 11:50

## 2024-03-10 RX ADMIN — HEPARIN SODIUM 5000 UNIT(S): 5000 INJECTION INTRAVENOUS; SUBCUTANEOUS at 05:23

## 2024-03-10 NOTE — PHYSICAL THERAPY INITIAL EVALUATION ADULT - ADDITIONAL COMMENTS
Pt. reports he lives with his wife in a private house with no REDD and stairs inside the house. Pt. reports he was fully independent PTA and works full time.

## 2024-03-10 NOTE — PHYSICAL THERAPY INITIAL EVALUATION ADULT - GENERAL OBSERVATIONS, REHAB EVAL
Pt. encountered alert and NAD, supine in bed (+)thrasher, agreeable to PT IE. Pt. left in b/s chair (+)alarms (+)call bell in reach (+)thrasher. NAD

## 2024-03-10 NOTE — CONSULT NOTE ADULT - SUBJECTIVE AND OBJECTIVE BOX
UROLOGY CONSULT NOTE:    Pt is a 74yo Male with pmh of BPH (on finasteride), HTN, HLD presented to the ER with lower abdominal pain and constipation. Pt seen and examined at bedside. Pt states he was been having increased difficulty having bowel movement, which prompted him to come to the ER. Pt states he follows with a private urologist Dr. Ana Juarez, who he last saw in November. Pt states he had bladder stone removed in 2021, but denies any other urologic procedures in the past. Pt states he noticed increased urinary frequency and nocturia lately even when taking his finasteride. Pt admits to family history of prostate cancer (his two brothers, and uncle). Pt denies history of smoking. Pt denies fever, chills, N/V, suprapubic pain, flank pain, dysuria, or hematuria.       PAST MEDICAL & SURGICAL HISTORY:  HTN,    HLD    BPH        Allergies  Allergy Status Unknown        SOCIAL HISTORY: No illicit drug use    FAMILY HISTORY: prostate ca (uncle, and two brothers)      REVIEW OF SYSTEMS   [x] A ten-point review of systems was otherwise negative except as noted.      Vital Signs Last 24 Hrs  T(C): 36.4 (08 Mar 2024 21:56), Max: 36.4 (08 Mar 2024 21:56)  T(F): 97.6 (08 Mar 2024 21:56), Max: 97.6 (08 Mar 2024 21:56)  HR: 75 (08 Mar 2024 21:56) (75 - 75)  BP: 181/87 (08 Mar 2024 21:56) (181/87 - 181/87)  RR: 18 (08 Mar 2024 21:56) (18 - 18)  SpO2: 99% (08 Mar 2024 21:56) (99% - 99%)    Parameters below as of 08 Mar 2024 21:56  Patient On (Oxygen Delivery Method): room air        PHYSICAL EXAM  GEN: NAD, awake and alert.  SKIN: Good color, non diaphoretic.  RESP: Non-labored breathing. No use of accessory muscles.  ABDO: Soft, NT/ND, + palpable bladder, no suprapubic tenderness   BACK: No CVAT B/L  : + Circumcised. B/L descended testicles x 2. No lesions or palpable masses noted B/L. No meatal discharge. + hypospadias     EXT: HART x 4  NEURO: A&O x3         LABS:                        11.2   15.34 )-----------( 262      ( 09 Mar 2024 00:00 )             32.9     03-09    137  |  104  |  29<H>  ----------------------------<  123<H>  4.1   |  17  |  1.9<H>    Ca    9.0      09 Mar 2024 00:00    TPro  7.2  /  Alb  4.5  /  TBili  0.7  /  DBili  x   /  AST  21  /  ALT  22  /  AlkPhos  626<H>  03-09    Urinalysis (03.09.24 @ 02:30)   Glucose Qualitative, Urine: Negative mg/dL  Blood, Urine: Negative  pH Urine: 5.5  Color: Yellow  Urine Appearance: Clear  Bilirubin: Negative  Ketone - Urine: Negative mg/dL  Specific Gravity: 1.022  Protein, Urine: Trace mg/dL  Urobilinogen: 0.2 mg/dL  Nitrite: Negative  Leukocyte Esterase Concentration: Negative          RADIOLOGY & ADDITIONAL STUDIES:  < from: CT Abdomen and Pelvis w/ IV Cont (03.08.24 @ 23:43) >    ACC: [id number] EXAM:  CT ABDOMEN AND PELVIS IC   ORDERED BY: DEBORAH ALEXANDER     PROCEDURE DATE:  03/08/2024          INTERPRETATION:  CLINICAL STATEMENT: Abdominal pain. Constipation.    TECHNIQUE: Contiguous axial CT images were obtained from the lower chest   to the pubic symphysis following administration of intravenous contrast.    95 cc administered of Omnipaque 350 (5 cc discarded).  Oral contrast was   not administered.  Reformatted images in the coronal and sagittal planes   were acquired.    Comparison made with CT abdomen and pelvis 5/15/2015.    FINDINGS:    LOWER CHEST: Unremarkable.    HEPATOBILIARY: Unremarkable.    SPLEEN: Unremarkable.    PANCREAS: Unremarkable.    ADRENAL GLANDS: Unremarkable.    KIDNEYS/PELVIC ORGANS: Left lateral urinary bladder wall masslike   thickening, contiguous with prostate, with extravesicular tumor   infiltration. Mass at region of left UVJ, with resultant moderate left   hydroureteronephrosis. Urinary bladder distended. BPH with median lobe   hypertrophy. Three urinary bladder intraluminal calculi, measuring up to   0.5 cm. Relation of prostate gland and rectum limited by modality. Right   mild hydronephrosis or fullness.    ABDOMINOPELVIC NODES: Suspicious pelvic lymphadenopathy, including left   medial external iliac chain 2 x 1.6 cm lymph node, right internal iliac   chain 1.9 x 1.2 cm lymph node, multiple presacral lymph nodes measuring   up to 0.9 cm short axis, and perirectal lymph nodes measuring up to 1.5 x   1 cm.    PERITONEUM/MESENTERY/BOWEL: Post appendectomy. No bowel obstruction. No   ascites. Colonic diverticulosis. Mild colonic stool burden..    BONES/SOFT TISSUES: Patchy faint sclerosis throughout visualized spine,   bony pelvis and bilateral ribs, suspiciousfor osteoblastic metastatic   bone disease.    OTHER: Vascular calcifications.      IMPRESSION:    1. Left lateral urinary bladder wall masslike thickening, contiguous with   prostate, with extravesicular tumor infiltration. Mass at region of left   UVJ, with resultant moderate left hydroureteronephrosis. Urinary bladder   distended. Right mild hydronephrosis or fullness. BPH with median lobe   hypertrophy. Additional findings suspicious for osteoblastic metastatic   bone disease. Findings compatible with metastatic neoplasm; prostate   cancer invading bladder favored over primary urinary bladder neoplasm   given suspicious osteoblastic metastatic bone disease.. Correlation with   PSA recommended.    2. Suspicious pelvic lymphadenopathy, catalogued above.    3. Mild colonic stool burden. No bowel obstruction. Relation of prostate   gland and rectum limited by modality.    --- End of Report ---      MAGALI GREGORIO MD; Attending Radiologist  This document has been electronically signed. Mar  9 2024 12:03AM    < end of copied text >  
Hematology Consult Note    HPI:  Pt is a 74yo Male with pmh of BPH (on finasteride), HTN, HLD presented to the ER with lower abdominal pain and constipation for the past few days. The patient follows with Urologist Dr. Perez and private Oncologist in [place]. The patient mentioned that he is not aware of the prostate cancer diagnosis and was being managed for BPH by urology. He mentioned that he had a bladder stone removal in 2021 and had colonoscopy recently in Feb which showed 5 polyps. The patient has increase in urinary frequency and nocturia but denies any dysuria and attributes it to Finasteride. He has FHx ( 2 brothers) of prostate cancer. The patient mentioned that he would like to see his private oncologist rather than get treatment with oncology team here. The patient has been making urine and Hensley was in ED as requested by urology and has mild hematuria (likely due to traumatic Hensley).    Pt denies history of smoking. Pt denies fever, chills, N/V, suprapubic pain, flank pain, dysuria,     Vital Signs Last 24 Hrs  T(C): 36.4 (09 Mar 2024 07:38), Max: 36.4 (08 Mar 2024 21:56)  T(F): 97.6 (09 Mar 2024 07:38), Max: 97.6 (08 Mar 2024 21:56)  HR: 76 (09 Mar 2024 07:38) (72 - 78)  BP: 127/71 (09 Mar 2024 07:38) (125/59 - 181/87)--  RR: 18 (09 Mar 2024 07:38) (18 - 18)  SpO2: 99% (09 Mar 2024 07:38) (99% - 99%)  O2 Parameters below as of 09 Mar 2024 07:38  Patient On (Oxygen Delivery Method): room air         (09 Mar 2024 09:32)      Allergies    Allergy Status Unknown    Intolerances        MEDICATIONS  (STANDING):  atorvastatin 20 milliGRAM(s) Oral at bedtime  finasteride 5 milliGRAM(s) Oral daily  heparin   Injectable 5000 Unit(s) SubCutaneous every 12 hours  lactated ringers. 1000 milliLiter(s) (75 mL/Hr) IV Continuous <Continuous>  losartan 100 milliGRAM(s) Oral daily  pantoprazole    Tablet 40 milliGRAM(s) Oral before breakfast  polyethylene glycol 3350 17 Gram(s) Oral two times a day  senna 2 Tablet(s) Oral at bedtime    MEDICATIONS  (PRN):  acetaminophen     Tablet .. 650 milliGRAM(s) Oral every 6 hours PRN Temp greater or equal to 38C (100.4F), Mild Pain (1 - 3)  aluminum hydroxide/magnesium hydroxide/simethicone Suspension 30 milliLiter(s) Oral every 4 hours PRN Dyspepsia  melatonin 3 milliGRAM(s) Oral at bedtime PRN Insomnia  ondansetron Injectable 4 milliGRAM(s) IV Push every 8 hours PRN Nausea and/or Vomiting      PAST MEDICAL & SURGICAL HISTORY:  Prostate cancer      No significant past surgical history          FAMILY HISTORY:  No pertinent family history in first degree relatives        SOCIAL HISTORY: No EtOH, no tobacco    REVIEW OF SYSTEMS:    CONSTITUTIONAL: No weakness, fevers or chills  EYES/ENT: No visual changes;  No vertigo or throat pain   NECK: No pain or stiffness  RESPIRATORY: No cough, wheezing, hemoptysis; No shortness of breath  CARDIOVASCULAR: No chest pain or palpitations  GASTROINTESTINAL: No abdominal or epigastric pain. No nausea, vomiting, or hematemesis; No diarrhea or constipation. No melena or hematochezia.  GENITOURINARY: No dysuria, frequency or hematuria  NEUROLOGICAL: No numbness or weakness  SKIN: No itching, burning, rashes, or lesions   All other review of systems is negative unless indicated above.    Height (cm): 170.2 (03-09 @ 18:07)  Weight (kg): 73.5 (03-09 @ 18:07)  BMI (kg/m2): 25.4 (03-09 @ 18:07)  BSA (m2): 1.85 (03-09 @ 18:07)    T(F): 98.4 (03-10-24 @ 05:11), Max: 99 (03-09-24 @ 20:48)  HR: 90 (03-10-24 @ 05:11)  BP: 127/64 (03-10-24 @ 05:11)  RR: 18 (03-10-24 @ 05:11)  SpO2: 94% (03-09-24 @ 20:48)  Wt(kg): --    GENERAL: NAD, well-developed  HEAD:  Atraumatic, Normocephalic  EYES: EOMI, PERRLA, conjunctiva and sclera clear  NECK: Supple, No JVD  CHEST/LUNG: Clear to auscultation bilaterally; No wheeze  HEART: Regular rate and rhythm; No murmurs, rubs, or gallops  ABDOMEN: Soft, Nontender, Nondistended; Bowel sounds present  EXTREMITIES:  2+ Peripheral Pulses, No clubbing, cyanosis, or edema  NEUROLOGY: non-focal  SKIN: No rashes or lesions                          10.3   11.43 )-----------( 208      ( 10 Mar 2024 06:34 )             30.8       03-09    139  |  108  |  30<H>  ----------------------------<  106<H>  4.5   |  19  |  2.2<H>    Ca    8.6      09 Mar 2024 07:04  Phos  4.7     03-09  Mg     2.1     03-09    TPro  6.1  /  Alb  3.9  /  TBili  0.6  /  DBili  x   /  AST  17  /  ALT  17  /  AlkPhos  565<H>  03-09

## 2024-03-10 NOTE — PHYSICAL THERAPY INITIAL EVALUATION ADULT - PERTINENT HX OF CURRENT PROBLEM, REHAB EVAL
Pt is a 72yo Male with pmh of BPH (on finasteride), HTN, HLD presented to the ER with lower abdominal pain and constipation for the past few days. The patient follows with Urologist Dr. Perez and private Oncologist in [place]. The patient mentioned that he is not aware of the prostate cancer diagnosis and was being managed for BPH by urology. He mentioned that he had a bladder stone removal in 2021 and had colonoscopy recently in Feb which showed 5 polyps. The patient has increase in urinary frequency and nocturia but denies any dysuria and attributes it to Finasteride. He has FHx ( 2 brothers) of prostate cancer. The patient mentioned that he would like to see his private oncologist rather than get treatment with oncology team here. The patient has been making urine and Hensley was in ED as requested by urology and has mild hematuria (likely due to traumatic Hens[place]).

## 2024-03-10 NOTE — PATIENT PROFILE ADULT - STATED REASON FOR ADMISSION
Hide Additional Notes?: No Detail Level: Detailed Detail Level: Zone Abd pain, distention, urinary retention

## 2024-03-10 NOTE — PROGRESS NOTE ADULT - SUBJECTIVE AND OBJECTIVE BOX
UROLOGY PROGRESS NOTE    Pt is a 73y M admitted with abdominal pain, constipation, retention, hydronephrosis and CT findings compatible with metastatic neoplasm. Pt seen and examined at bedside with Dr. Aragon during rounds.     MEDICATIONS  (STANDING):  atorvastatin 20 milliGRAM(s) Oral at bedtime  finasteride 5 milliGRAM(s) Oral daily  lactated ringers. 1000 milliLiter(s) (75 mL/Hr) IV Continuous <Continuous>  losartan 100 milliGRAM(s) Oral daily  pantoprazole    Tablet 40 milliGRAM(s) Oral before breakfast  polyethylene glycol 3350 17 Gram(s) Oral two times a day  senna 2 Tablet(s) Oral at bedtime  sodium bicarbonate 650 milliGRAM(s) Oral three times a day  tamsulosin 0.4 milliGRAM(s) Oral at bedtime    MEDICATIONS  (PRN):  acetaminophen     Tablet .. 650 milliGRAM(s) Oral every 6 hours PRN Temp greater or equal to 38C (100.4F), Mild Pain (1 - 3)  aluminum hydroxide/magnesium hydroxide/simethicone Suspension 30 milliLiter(s) Oral every 4 hours PRN Dyspepsia  melatonin 3 milliGRAM(s) Oral at bedtime PRN Insomnia  ondansetron Injectable 4 milliGRAM(s) IV Push every 8 hours PRN Nausea and/or Vomiting      REVIEW OF SYSTEMS   [x] A ten-point review of systems was otherwise negative except as noted.    Vital Signs Last 24 Hrs  T(C): 36.8 (10 Mar 2024 14:13), Max: 37.2 (09 Mar 2024 20:48)  T(F): 98.3 (10 Mar 2024 14:13), Max: 99 (09 Mar 2024 20:48)  HR: 83 (10 Mar 2024 14:13) (83 - 92)  BP: 132/76 (10 Mar 2024 14:13) (101/55 - 132/76)  BP(mean): 74 (09 Mar 2024 20:48) (74 - 80)  RR: 18 (10 Mar 2024 14:13) (17 - 18)  SpO2: 94% (09 Mar 2024 20:48) (94% - 96%)    Parameters below as of 09 Mar 2024 20:48  Patient On (Oxygen Delivery Method): room air      PHYSICAL EXAM:  GEN: NAD  NEURO: Awake and alert   RESP: Non-labored breathing   ABDO: Soft, NT   BACK: No CVAT B/L  : +thrasher noted with slightly hematuric urine  EXT: HART x 4      LABS:                        10.3   11.43 )-----------( 208      ( 10 Mar 2024 06:34 )             30.8     03-10    141  |  111<H>  |  41<H>  ----------------------------<  91  4.3   |  17  |  2.1<H>    Ca    8.0<L>      10 Mar 2024 06:34  Phos  4.7     03-09  Mg     2.2     03-10    TPro  5.7<L>  /  Alb  3.5  /  TBili  0.6  /  DBili  x   /  AST  15  /  ALT  14  /  AlkPhos  508<H>  03-10      Prostate Specific Antigen: 9.73

## 2024-03-10 NOTE — PROGRESS NOTE ADULT - ASSESSMENT
74yo Male with pmh of BPH (on finasteride), HTN, HLD presented to the ER with lower abdominal pain and constipation for the past few days. The patient follows with Urologist Dr. Perez and private Oncologist in [place]. The patient mentioned that he is not aware of the prostate cancer diagnosis and was being managed for BPH by urology. He mentioned that he had a bladder stone removal in 2021 and had colonoscopy recently in Feb which showed 5 polyps. The patient has increase in urinary frequency and nocturia but denies any dysuria and attributes it to Finasteride. He has FHx ( 2 brothers) of prostate cancer. The patient mentioned that he would like to see his private oncologist rather than get treatment with oncology team here. The patient has been making urine and Hensley was in ED as requested by urology and has mild hematuria (likely due to traumatic Hens[place]).       # Suspected metastatic prostate cancer  # Hx of BPH  # Post renal JIGNESH 2/2 acute urinary retention   # s/p IV contrast   - PSA 9  - UA negative  - f/u Bone scan  - patient wants to be treated with his private oncologist Dr. Larry Perez  - CT A/P: Left lateral urinary bladder wall masslike thickening, contiguous with prostate, with extravesicular tumor infiltration. Mass at region of left UVJ, with resultant moderate left hydroureteronephrosis. Urinary bladder distended. Right mild hydronephrosis or fullness. BPH with median lobe hypertrophy. 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 Findings compatible with metastatic neoplasm; prostate cancer invading bladder favored over primary urinary bladder neoplasm given suspicious osteoblastic metastatic bone disease. Suspicious pelvic lymphadenopathy, catalogued above. Mild colonic stool burden. No bowel obstruction. Relation of prostate gland and rectum limited by modality.  - c/w sodium bicarbonate   - IVF   - Flomax and finasteride     # Constipation  - c/w bowel regimen     # HTN  - c/w ARB       # HLD  - c/w Lipitor    DVT PPx - SCD   GI PPx - Protonix    Pending: resolution of hematuria, urology f/u for biopsy  Plan of care d/w pt and family   dispo: home

## 2024-03-10 NOTE — PHYSICAL THERAPY INITIAL EVALUATION ADULT - GAIT TRAINING, PT EVAL
Pt. will amb at least 200 ft Ind by D/C. Pt. will ascend and descend at least 12 stairs using 1 HR Ind by D/C

## 2024-03-10 NOTE — PROGRESS NOTE ADULT - SUBJECTIVE AND OBJECTIVE BOX
Pt seen and examined at bedside.      VITAL SIGNS (Last 24 hrs):  T(C): 36.9 (03-10-24 @ 05:11), Max: 37.2 (03-09-24 @ 20:48)  HR: 90 (03-10-24 @ 05:11) (90 - 92)  BP: 127/64 (03-10-24 @ 05:11) (100/56 - 127/64)  RR: 18 (03-10-24 @ 05:11) (17 - 18)  SpO2: 94% (03-09-24 @ 20:48) (94% - 97%)  Wt(kg): --  Daily Height in cm: 170.18 (09 Mar 2024 18:07)    Daily     I&O's Summary      PHYSICAL EXAM:  GENERAL: NAD   HEAD:  Atraumatic, Normocephalic  EYES:  conjunctiva and sclera clear  NECK: Supple, No JVD  CHEST/LUNG: Clear to auscultation bilaterally; No wheeze  HEART: Regular rate and rhythm; No murmurs, rubs, or gallops  ABDOMEN: Soft, Nontender, Nondistended; Bowel sounds present  EXTREMITIES:  2+ Peripheral Pulses, No clubbing, cyanosis, or edema  PSYCH: AAOx3  NEUROLOGY: non-focal  SKIN: No rashes or lesions    Labs Reviewed          CBC Full  -  ( 10 Mar 2024 06:34 )  WBC Count : 11.43 K/uL  Hemoglobin : 10.3 g/dL  Hematocrit : 30.8 %  Platelet Count - Automated : 208 K/uL  Mean Cell Volume : 91.4 fL  Mean Cell Hemoglobin : 30.6 pg  Mean Cell Hemoglobin Concentration : 33.4 g/dL  Auto Neutrophil # : 8.55 K/uL  Auto Lymphocyte # : 1.41 K/uL  Auto Monocyte # : 1.12 K/uL  Auto Eosinophil # : 0.21 K/uL  Auto Basophil # : 0.06 K/uL  Auto Neutrophil % : 74.9 %  Auto Lymphocyte % : 12.3 %  Auto Monocyte % : 9.8 %  Auto Eosinophil % : 1.8 %  Auto Basophil % : 0.5 %    BMP:    03-10 @ 06:34    Blood Urea Nitrogen - 41  Calcium - 8.0  Carbond Dioxide - 17  Chloride - 111  Creatinine - 2.1  Glucose - 91  Potassium - 4.3  Sodium - 141      Hemoglobin A1c -     Urine Culture:  03-09 @ 02:30 Urine culture: --    Culture Results:   <10,000 CFU/mL Normal Urogenital Heidy  Method Type: --  Organism: --  Organism Identification: --  Specimen Source: Clean Catch Clean Catch (Midstream)            MEDICATIONS  (STANDING):  atorvastatin 20 milliGRAM(s) Oral at bedtime  finasteride 5 milliGRAM(s) Oral daily  lactated ringers. 1000 milliLiter(s) (75 mL/Hr) IV Continuous <Continuous>  losartan 100 milliGRAM(s) Oral daily  pantoprazole    Tablet 40 milliGRAM(s) Oral before breakfast  polyethylene glycol 3350 17 Gram(s) Oral two times a day  senna 2 Tablet(s) Oral at bedtime    MEDICATIONS  (PRN):  acetaminophen     Tablet .. 650 milliGRAM(s) Oral every 6 hours PRN Temp greater or equal to 38C (100.4F), Mild Pain (1 - 3)  aluminum hydroxide/magnesium hydroxide/simethicone Suspension 30 milliLiter(s) Oral every 4 hours PRN Dyspepsia  melatonin 3 milliGRAM(s) Oral at bedtime PRN Insomnia  ondansetron Injectable 4 milliGRAM(s) IV Push every 8 hours PRN Nausea and/or Vomiting

## 2024-03-10 NOTE — PROGRESS NOTE ADULT - ASSESSMENT
Pt is a 73y M admitted with abdominal pain;  following for retention, hydronephrosis and CT findings compatible with metastatic neoplasm.    ·	PSA 9.73  ·	f/u IR for bone biopy   ·	Cont with thrasher catheter-- had 950cc output on insertion  ·	Repeat RBUS in 72 hrs from insertion to reassess hydro  ·	Pt seen at bedside with Dr. Aragon

## 2024-03-10 NOTE — PATIENT PROFILE ADULT - FALL HARM RISK - HARM RISK INTERVENTIONS

## 2024-03-10 NOTE — CONSULT NOTE ADULT - ATTENDING COMMENTS
seen/ examined w/ hemonc fellow; note reviewed; case discussed; agree wplan   ONCOLOGY evaluaiton is pending  needs biopsy  bone scan is pending  ct chest is pending

## 2024-03-10 NOTE — CONSULT NOTE ADULT - ASSESSMENT
Pt is a 72yo Male with pmh of BPH (on finasteride), HTN, HLD presented to the ER with lower abdominal pain and constipation for the past few days. The patient follows with Urologist Dr. Perez and private Oncologist in [place]. The patient mentioned that he is not aware of the prostate cancer diagnosis and was being managed for BPH by urology.    [facility]eme Onc consulted for the findings of prostate mass on imaging    # Suspected Denovo Metastatic prostate cancer  # Hx of BPH    - CT A/P: Left lateral urinary bladder wall masslike thickening, contiguous with prostate, with extravesicular tumor infiltration. Mass at region of left UVJ, with resultant moderate left hydroureteronephrosis. Urinary bladder distended. Right mild hydronephrosis or fullness. BPH with median lobe hypertrophy. 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 Findings compatible with metastatic neoplasm; prostate cancer invading bladder favored over primary urinary bladder neoplasm given suspicious osteoblastic metastatic bone disease.  - PSA- 9  - UA negative  - f/u Bone scan  - patient follows with Urologist  Dr. Larry Perez in [place]  - Billie phos elevated 526 due to bone mets  - Strong family Hx of Prostate cancer ( 2 brothers)    # JIGNESH  # Bilateral hydronephrosis likely from LI    Recommendations:    - Agree with Bone scan  - Urology consult for cystoscopy and Biopsy of bladder mass extravasating into bladder  - IVF hydration for JIGNESH  - So far picture is consistent with Denovo metastatic Prostate Cancer. Would like to get a biopsy to r/o small cell features to it given extensive disease with only moderately elevated PSA.    We will follow up  for any questions call or text via MS teams   Pt is a 74yo Male with pmh of BPH (on finasteride), HTN, HLD presented to the ER with lower abdominal pain and constipation for the past few days. The patient follows with Urologist Dr. Perez and private Oncologist in [place]. The patient mentioned that he is not aware of the prostate cancer diagnosis and was being managed for BPH by urology.    Heme Onc consulted for the findings of prostate mass on imaging    # Suspected High-Volume Denovo Metastatic prostate cancer  # Hx of BPH    - CT A/P: Left lateral urinary bladder wall masslike thickening, contiguous with prostate, with extravesicular tumor infiltration. Mass at region of left UVJ, with resultant moderate left hydroureteronephrosis. Urinary bladder distended. Right mild hydronephrosis or fullness. BPH with median lobe hypertrophy. 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 Findings compatible with metastatic neoplasm; prostate cancer invading bladder favored over primary urinary bladder neoplasm given suspicious osteoblastic metastatic bone disease.  - PSA- 9  - UA negative  - f/u Bone scan  - patient follows with Urologist  Dr. Larry Perez in [place]  - Billie phos elevated 526 due to bone mets  - Strong family Hx of Prostate cancer ( 2 brothers)    # JIGNESH  # Bilateral hydronephrosis likely from LI    Recommendations:    - Agree with Bone scan  - Will need tissue biopsy. Urology consult for cystoscopy and Biopsy of bladder mass extravasating into bladder. Will prefer soft tissue biopsy over bone biopsy.  - IVF hydration for JIGNESH  - So far picture is consistent with Denovo metastatic Prostate Cancer. Would like to get a biopsy to r/o small cell features to it given extensive disease with only moderately elevated PSA.    We will follow up  for any questions call or text via MS teams   Pt is a 72yo Male with pmh of BPH (on finasteride), HTN, HLD presented to the ER with lower abdominal pain and constipation for the past few days. The patient follows with Urologist Dr. Perez and private Oncologist in [place]. The patient mentioned that he is not aware of the prostate cancer diagnosis and was being managed for BPH by urology.    Heme Onc consulted for the findings of prostate mass on imaging    # Suspected High-Volume Denovo Metastatic prostate cancer  # Hx of BPH    - CT A/P: Left lateral urinary bladder wall masslike thickening, contiguous with prostate, with extravesicular tumor infiltration. Mass at region of left UVJ, with resultant moderate left hydroureteronephrosis. Urinary bladder distended. Right mild hydronephrosis or fullness. BPH with median lobe hypertrophy. 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 Findings compatible with metastatic neoplasm; prostate cancer invading bladder favored over primary urinary bladder neoplasm given suspicious osteoblastic metastatic bone disease.  - PSA- 9  - UA negative  - f/u Bone scan  - patient follows with Urologist  Dr. Larry Perez in [place]  - Billie phos elevated 526 due to bone mets  - Strong family Hx of Prostate cancer ( 2 brothers)    # JIGNESH  # Bilateral hydronephrosis likely from LI    Recommendations:    - Agree with Bone scan  - Will need tissue biopsy. Urology consult for cystoscopy and Biopsy of bladder mass extravasating into bladder. Will prefer soft tissue biopsy over bone biopsy.  - Question for Urology: if MRI Pelvis is needed?  - IVF hydration for JIGNESH  - So far picture is consistent with Denovo metastatic Prostate Cancer. Would like to get a biopsy to r/o small cell features to it given extensive disease with only moderately elevated PSA.    We will follow up  for any questions call or text via MS teams   Pt is a 74yo Male with pmh of BPH (on finasteride), HTN, HLD presented to the ER with lower abdominal pain and constipation for the past few days. The patient follows with Urologist Dr. Perez and private Oncologist in [place]. The patient mentioned that he is not aware of the prostate cancer diagnosis and was being managed for BPH by urology.    Heme Onc consulted for the findings of prostate mass on imaging    # Suspected High-Volume Denovo Metastatic prostate cancer  # Hx of BPH    - CT A/P: Left lateral urinary bladder wall masslike thickening, contiguous with prostate, with extravesicular tumor infiltration. Mass at region of left UVJ, with resultant moderate left hydroureteronephrosis. Urinary bladder distended. Right mild hydronephrosis or fullness. BPH with median lobe hypertrophy. 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 Findings compatible with metastatic neoplasm; prostate cancer invading bladder favored over primary urinary bladder neoplasm given suspicious osteoblastic metastatic bone disease.  - PSA- 9  - UA negative  - f/u Bone scan  - patient follows with Urologist  Dr. Larry Peerz in [place]  - Billie phos elevated 526 due to bone mets  - Strong family Hx of Prostate cancer ( 2 brothers)    # JIGNESH  # Bilateral hydronephrosis likely from LI    Recommendations:    - Agree with Bone scan  - Will need tissue biopsy. Urology consult for cystoscopy and Biopsy of bladder mass extravasating into bladder. Will prefer soft tissue biopsy over bone biopsy.  - IVF hydration for JIGNESH  - So far picture is consistent with Denovo metastatic Prostate Cancer. Would like to get a biopsy to r/o small cell features to it given extensive disease with only moderately elevated PSA.    We will follow up  for any questions call or text via MS teams

## 2024-03-11 LAB
ALBUMIN SERPL ELPH-MCNC: 3.7 G/DL — SIGNIFICANT CHANGE UP (ref 3.5–5.2)
ALP SERPL-CCNC: 551 U/L — HIGH (ref 30–115)
ALT FLD-CCNC: 14 U/L — SIGNIFICANT CHANGE UP (ref 0–41)
ANION GAP SERPL CALC-SCNC: 13 MMOL/L — SIGNIFICANT CHANGE UP (ref 7–14)
AST SERPL-CCNC: 16 U/L — SIGNIFICANT CHANGE UP (ref 0–41)
BASOPHILS # BLD AUTO: 0.05 K/UL — SIGNIFICANT CHANGE UP (ref 0–0.2)
BASOPHILS NFR BLD AUTO: 0.4 % — SIGNIFICANT CHANGE UP (ref 0–1)
BILIRUB SERPL-MCNC: 0.6 MG/DL — SIGNIFICANT CHANGE UP (ref 0.2–1.2)
BUN SERPL-MCNC: 38 MG/DL — HIGH (ref 10–20)
CALCIUM SERPL-MCNC: 8.3 MG/DL — LOW (ref 8.4–10.5)
CHLORIDE SERPL-SCNC: 109 MMOL/L — SIGNIFICANT CHANGE UP (ref 98–110)
CO2 SERPL-SCNC: 20 MMOL/L — SIGNIFICANT CHANGE UP (ref 17–32)
CREAT SERPL-MCNC: 2.1 MG/DL — HIGH (ref 0.7–1.5)
EGFR: 33 ML/MIN/1.73M2 — LOW
EOSINOPHIL # BLD AUTO: 0.12 K/UL — SIGNIFICANT CHANGE UP (ref 0–0.7)
EOSINOPHIL NFR BLD AUTO: 1 % — SIGNIFICANT CHANGE UP (ref 0–8)
GLUCOSE SERPL-MCNC: 105 MG/DL — HIGH (ref 70–99)
HCT VFR BLD CALC: 30.3 % — LOW (ref 42–52)
HCV AB S/CO SERPL IA: 0.04 COI — SIGNIFICANT CHANGE UP
HCV AB SERPL-IMP: SIGNIFICANT CHANGE UP
HGB BLD-MCNC: 10.3 G/DL — LOW (ref 14–18)
IMM GRANULOCYTES NFR BLD AUTO: 0.9 % — HIGH (ref 0.1–0.3)
LYMPHOCYTES # BLD AUTO: 1.25 K/UL — SIGNIFICANT CHANGE UP (ref 1.2–3.4)
LYMPHOCYTES # BLD AUTO: 10.7 % — LOW (ref 20.5–51.1)
MAGNESIUM SERPL-MCNC: 2.1 MG/DL — SIGNIFICANT CHANGE UP (ref 1.8–2.4)
MCHC RBC-ENTMCNC: 31 PG — SIGNIFICANT CHANGE UP (ref 27–31)
MCHC RBC-ENTMCNC: 34 G/DL — SIGNIFICANT CHANGE UP (ref 32–37)
MCV RBC AUTO: 91.3 FL — SIGNIFICANT CHANGE UP (ref 80–94)
MONOCYTES # BLD AUTO: 1.27 K/UL — HIGH (ref 0.1–0.6)
MONOCYTES NFR BLD AUTO: 10.9 % — HIGH (ref 1.7–9.3)
NEUTROPHILS # BLD AUTO: 8.88 K/UL — HIGH (ref 1.4–6.5)
NEUTROPHILS NFR BLD AUTO: 76.1 % — HIGH (ref 42.2–75.2)
NRBC # BLD: 0 /100 WBCS — SIGNIFICANT CHANGE UP (ref 0–0)
PLATELET # BLD AUTO: 202 K/UL — SIGNIFICANT CHANGE UP (ref 130–400)
PMV BLD: 10.3 FL — SIGNIFICANT CHANGE UP (ref 7.4–10.4)
POTASSIUM SERPL-MCNC: 4.1 MMOL/L — SIGNIFICANT CHANGE UP (ref 3.5–5)
POTASSIUM SERPL-SCNC: 4.1 MMOL/L — SIGNIFICANT CHANGE UP (ref 3.5–5)
PROT SERPL-MCNC: 5.8 G/DL — LOW (ref 6–8)
RBC # BLD: 3.32 M/UL — LOW (ref 4.7–6.1)
RBC # FLD: 13.1 % — SIGNIFICANT CHANGE UP (ref 11.5–14.5)
SODIUM SERPL-SCNC: 142 MMOL/L — SIGNIFICANT CHANGE UP (ref 135–146)
WBC # BLD: 11.67 K/UL — HIGH (ref 4.8–10.8)
WBC # FLD AUTO: 11.67 K/UL — HIGH (ref 4.8–10.8)

## 2024-03-11 PROCEDURE — 78306 BONE IMAGING WHOLE BODY: CPT | Mod: 26

## 2024-03-11 PROCEDURE — 78803 RP LOCLZJ TUM SPECT 1 AREA: CPT | Mod: 26

## 2024-03-11 PROCEDURE — 99232 SBSQ HOSP IP/OBS MODERATE 35: CPT

## 2024-03-11 RX ORDER — NIFEDIPINE 30 MG
60 TABLET, EXTENDED RELEASE 24 HR ORAL DAILY
Refills: 0 | Status: DISCONTINUED | OUTPATIENT
Start: 2024-03-12 | End: 2024-03-12

## 2024-03-11 RX ADMIN — Medication 650 MILLIGRAM(S): at 05:12

## 2024-03-11 RX ADMIN — LOSARTAN POTASSIUM 100 MILLIGRAM(S): 100 TABLET, FILM COATED ORAL at 05:13

## 2024-03-11 RX ADMIN — Medication 650 MILLIGRAM(S): at 05:13

## 2024-03-11 RX ADMIN — SODIUM CHLORIDE 75 MILLILITER(S): 9 INJECTION, SOLUTION INTRAVENOUS at 12:08

## 2024-03-11 RX ADMIN — ATORVASTATIN CALCIUM 20 MILLIGRAM(S): 80 TABLET, FILM COATED ORAL at 21:47

## 2024-03-11 RX ADMIN — Medication 650 MILLIGRAM(S): at 21:47

## 2024-03-11 RX ADMIN — POLYETHYLENE GLYCOL 3350 17 GRAM(S): 17 POWDER, FOR SOLUTION ORAL at 17:18

## 2024-03-11 RX ADMIN — Medication 650 MILLIGRAM(S): at 14:12

## 2024-03-11 RX ADMIN — PANTOPRAZOLE SODIUM 40 MILLIGRAM(S): 20 TABLET, DELAYED RELEASE ORAL at 05:13

## 2024-03-11 RX ADMIN — TAMSULOSIN HYDROCHLORIDE 0.4 MILLIGRAM(S): 0.4 CAPSULE ORAL at 21:47

## 2024-03-11 RX ADMIN — SENNA PLUS 2 TABLET(S): 8.6 TABLET ORAL at 21:47

## 2024-03-11 RX ADMIN — FINASTERIDE 5 MILLIGRAM(S): 5 TABLET, FILM COATED ORAL at 11:57

## 2024-03-11 NOTE — PROGRESS NOTE ADULT - SUBJECTIVE AND OBJECTIVE BOX
24H events:    Patient is a 73y old Male who presents with a chief complaint of Hydronephrosis, metastatic prostate cancer (10 Mar 2024 17:16)    Primary diagnosis of Primary prostate malignancy    Today is hospital day 2d. This morning patient was seen and examined at bedside, resting comfortably in bed.    No acute or major events overnight.      PAST MEDICAL & SURGICAL HISTORY  Prostate cancer    No significant past surgical history      SOCIAL HISTORY:  Social History:      ALLERGIES:  Allergy Status Unknown    MEDICATIONS:  STANDING MEDICATIONS  atorvastatin 20 milliGRAM(s) Oral at bedtime  finasteride 5 milliGRAM(s) Oral daily  lactated ringers. 1000 milliLiter(s) IV Continuous <Continuous>  losartan 100 milliGRAM(s) Oral daily  pantoprazole    Tablet 40 milliGRAM(s) Oral before breakfast  polyethylene glycol 3350 17 Gram(s) Oral two times a day  senna 2 Tablet(s) Oral at bedtime  sodium bicarbonate 650 milliGRAM(s) Oral three times a day  tamsulosin 0.4 milliGRAM(s) Oral at bedtime    PRN MEDICATIONS  acetaminophen     Tablet .. 650 milliGRAM(s) Oral every 6 hours PRN  aluminum hydroxide/magnesium hydroxide/simethicone Suspension 30 milliLiter(s) Oral every 4 hours PRN  melatonin 3 milliGRAM(s) Oral at bedtime PRN  ondansetron Injectable 4 milliGRAM(s) IV Push every 8 hours PRN    VITALS:   T(F): 99.6  HR: 98  BP: 156/71  RR: 19  SpO2: 96%    PHYSICAL EXAM:  GENERAL:   (  ) NAD, lying in bed comfortably     (  ) obtunded     (  ) lethargic     (  ) somnolent    HEAD:   (  ) Atraumatic     (  ) hematoma     (  ) laceration (specify location:       )     NECK:  (  ) Supple     (  ) neck stiffness     (  ) nuchal rigidity     (  )  no JVD     (  ) JVD present ( -- cm)    HEART:  Rate -->     (  ) normal rate     (  ) bradycardic     (  ) tachycardic  Rhythm -->     (  ) regular     (  ) regularly irregular     (  ) irregularly irregular  Murmurs -->     (  ) normal s1s2     (  ) systolic murmur     (  ) diastolic murmur     (  ) continuous murmur      (  ) S3 present     (  ) S4 present    LUNGS:   (  )Unlabored respirations     (  ) tachypnea  (  ) B/L air entry     (  ) decreased breath sounds in:  (location     )    (  ) no adventitious sound     (  ) crackles     (  ) wheezing      (  ) rhonchi      (specify location:       )  (  ) chest wall tenderness (specify location:       )    ABDOMEN:   (  ) Soft     (  ) tense   |   (  ) nondistended     (  ) distended   |   (  ) +BS     (  ) hypoactive bowel sounds     (  ) hyperactive bowel sounds  (  ) nontender     (  ) RUQ tenderness     (  ) RLQ tenderness     (  ) LLQ tenderness     (  ) epigastric tenderness     (  ) diffuse tenderness  (  ) Splenomegaly      (  ) Hepatomegaly      (  ) Jaundice     (  ) ecchymosis     EXTREMITIES:  (  ) Normal     (  ) Rash     (  ) ecchymosis     (  ) varicose veins      (  ) pitting edema     (  ) non-pitting edema   (  ) ulceration     (  ) gangrene:     (location:     )    NERVOUS SYSTEM:    (  ) A&Ox3     (  ) confused     (  ) lethargic  CN II-XII:     (  ) Intact     (  ) deficits found     (Specify:     )   Upper extremities:     (  ) no sensorimotor deficits     (  ) weakness     (  ) loss of proprioception/vibration     (  ) loss of touch/temperature (specify:    )  Lower extremities:     (  ) no sensorimotor deficits     (  ) weakness     (  ) loss of proprioception/vibration     (  ) loss of touch/temperature (specify:    )    SKIN:   (  ) No rashes or lesions     (  ) maculopapular rash     (  ) pustules     (  ) vesicles     (  ) ulcer     (  ) ecchymosis     (specify location:     )    AMPAC score:    (  ) Indwelling Hensley Catheter:   Date insterted:    Reason (  ) Critical illness     (  ) urinary retention    (  ) Accurate Ins/Outs Monitoring     (  ) CMO patient    (  ) Central Line:   Date inserted:  Location: (  ) Right IJ     (  ) Left IJ     (  ) Right Fem     (  ) Left Fem    (  ) SPC        (  ) pigtail       (  ) PEG tube       (  ) colostomy       (  ) jejunostomy  (  ) U-Dall    LABS:                        10.3   11.43 )-----------( 208      ( 10 Mar 2024 06:34 )             30.8     03-10    141  |  111<H>  |  41<H>  ----------------------------<  91  4.3   |  17  |  2.1<H>    Ca    8.0<L>      10 Mar 2024 06:34  Phos  4.7     03-09  Mg     2.2     03-10    TPro  5.7<L>  /  Alb  3.5  /  TBili  0.6  /  DBili  x   /  AST  15  /  ALT  14  /  AlkPhos  508<H>  03-10      Urinalysis Basic - ( 10 Mar 2024 06:34 )    Color: x / Appearance: x / SG: x / pH: x  Gluc: 91 mg/dL / Ketone: x  / Bili: x / Urobili: x   Blood: x / Protein: x / Nitrite: x   Leuk Esterase: x / RBC: x / WBC x   Sq Epi: x / Non Sq Epi: x / Bacteria: x            Culture - Urine (collected 09 Mar 2024 02:30)  Source: Clean Catch Clean Catch (Midstream)  Final Report (10 Mar 2024 06:26):    <10,000 CFU/mL Normal Urogenital Heidy             24H events:    Patient is a 73y old Male who presents with a chief complaint of Hydronephrosis, metastatic prostate cancer (10 Mar 2024 17:16)    Primary diagnosis of Primary prostate malignancy    Today is hospital day 2d. This morning patient was seen and examined at bedside, resting comfortably in bed.    No acute or major events overnight.      PAST MEDICAL & SURGICAL HISTORY  Prostate cancer    No significant past surgical history      SOCIAL HISTORY:  Social History:      ALLERGIES:  Allergy Status Unknown    MEDICATIONS:  STANDING MEDICATIONS  atorvastatin 20 milliGRAM(s) Oral at bedtime  finasteride 5 milliGRAM(s) Oral daily  lactated ringers. 1000 milliLiter(s) IV Continuous <Continuous>  losartan 100 milliGRAM(s) Oral daily  pantoprazole    Tablet 40 milliGRAM(s) Oral before breakfast  polyethylene glycol 3350 17 Gram(s) Oral two times a day  senna 2 Tablet(s) Oral at bedtime  sodium bicarbonate 650 milliGRAM(s) Oral three times a day  tamsulosin 0.4 milliGRAM(s) Oral at bedtime    PRN MEDICATIONS  acetaminophen     Tablet .. 650 milliGRAM(s) Oral every 6 hours PRN  aluminum hydroxide/magnesium hydroxide/simethicone Suspension 30 milliLiter(s) Oral every 4 hours PRN  melatonin 3 milliGRAM(s) Oral at bedtime PRN  ondansetron Injectable 4 milliGRAM(s) IV Push every 8 hours PRN    VITALS:   T(F): 99.6  HR: 98  BP: 156/71  RR: 19  SpO2: 96%    PHYSICAL EXAM:  GENERAL:   ( X ) NAD, lying in bed comfortably     (  ) obtunded     (  ) lethargic     (  ) somnolent    HEAD:   ( X ) Atraumatic     (  ) hematoma     (  ) laceration (specify location:       )     NECK:  ( X ) Supple     (  ) neck stiffness     (  ) nuchal rigidity     (  )  no JVD     (  ) JVD present ( -- cm)    HEART:  Rate -->     ( X ) normal rate     (  ) bradycardic     (  ) tachycardic  Rhythm -->     ( X ) regular     (  ) regularly irregular     (  ) irregularly irregular  Murmurs -->     (  ) normal s1s2     (  ) systolic murmur     (  ) diastolic murmur     (  ) continuous murmur      (  ) S3 present     (  ) S4 present    LUNGS:   ( X )Unlabored respirations     (  ) tachypnea  ( X ) B/L air entry     (  ) decreased breath sounds in:  (location     )    (  ) no adventitious sound     (  ) crackles     (  ) wheezing      (  ) rhonchi      (specify location:       )  (  ) chest wall tenderness (specify location:       )    ABDOMEN:   ( X ) Soft     (  ) tense   |   (  ) nondistended     (  ) distended   |   (  ) +BS     (  ) hypoactive bowel sounds     (  ) hyperactive bowel sounds  ( X ) nontender     (  ) RUQ tenderness     (  ) RLQ tenderness     (  ) LLQ tenderness     (  ) epigastric tenderness     (  ) diffuse tenderness  (  ) Splenomegaly      (  ) Hepatomegaly      (  ) Jaundice     (  ) ecchymosis     EXTREMITIES:  ( X ) Normal     (  ) Rash     (  ) ecchymosis     (  ) varicose veins      (  ) pitting edema     (  ) non-pitting edema   (  ) ulceration     (  ) gangrene:     (location:     )    NERVOUS SYSTEM:    ( X ) A&Ox3     (  ) confused     (  ) lethargic  CN II-XII:     (  ) Intact     (  ) deficits found     (Specify:     )   Upper extremities:     (  ) no sensorimotor deficits     (  ) weakness     (  ) loss of proprioception/vibration     (  ) loss of touch/temperature (specify:    )  Lower extremities:     (  ) no sensorimotor deficits     (  ) weakness     (  ) loss of proprioception/vibration     (  ) loss of touch/temperature (specify:    )    SKIN:   (X  ) No rashes or lesions     (  ) maculopapular rash     (  ) pustules     (  ) vesicles     (  ) ulcer     (  ) ecchymosis     (specify location:     )      LABS:                        10.3   11.43 )-----------( 208      ( 10 Mar 2024 06:34 )             30.8     03-10    141  |  111<H>  |  41<H>  ----------------------------<  91  4.3   |  17  |  2.1<H>    Ca    8.0<L>      10 Mar 2024 06:34  Phos  4.7     03-09  Mg     2.2     03-10    TPro  5.7<L>  /  Alb  3.5  /  TBili  0.6  /  DBili  x   /  AST  15  /  ALT  14  /  AlkPhos  508<H>  03-10      Urinalysis Basic - ( 10 Mar 2024 06:34 )    Color: x / Appearance: x / SG: x / pH: x  Gluc: 91 mg/dL / Ketone: x  / Bili: x / Urobili: x   Blood: x / Protein: x / Nitrite: x   Leuk Esterase: x / RBC: x / WBC x   Sq Epi: x / Non Sq Epi: x / Bacteria: x            Culture - Urine (collected 09 Mar 2024 02:30)  Source: Clean Catch Clean Catch (Midstream)  Final Report (10 Mar 2024 06:26):    <10,000 CFU/mL Normal Urogenital Heidy

## 2024-03-11 NOTE — PROGRESS NOTE ADULT - ASSESSMENT
72yo Male with pmh of BPH (on finasteride), HTN, HLD presented to the ER with lower abdominal pain and constipation for the past few days. The patient follows with Urologist Dr. Perez and private Oncologist in [place]. The patient mentioned that he is not aware of the prostate cancer diagnosis and was being managed for BPH by urology. He mentioned that he had a bladder stone removal in 2021 and had colonoscopy recently in Feb which showed 5 polyps. The patient has increase in urinary frequency and nocturia but denies any dysuria and attributes it to Finasteride. He has FHx ( 2 brothers) of prostate cancer. The patient mentioned that he would like to see his private oncologist rather than get treatment with oncology team here. The patient has been making urine and Thrasher was in ED as requested by urology and has mild hematuria (likely due to traumatic Thrasher).     #Suspected metastatic prostate cancer  #Hx of BPH  #Post renal JIGNESH 2/2 acute urinary retention   #s/p IV contrast   - PSA 9.73  - UA negative  - f/u Bone scan  - patient wants to be treated with his private oncologist Dr. Larry Perez  - CT A/P: Left lateral urinary bladder wall masslike thickening, contiguous with prostate, with extravesicular tumor infiltration. Mass at region of left UVJ, with resultant moderate left hydroureteronephrosis. Urinary bladder distended. Right mild hydronephrosis or fullness. BPH with median lobe hypertrophy. 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 Findings compatible with metastatic neoplasm; prostate cancer invading bladder favored over primary urinary bladder neoplasm given suspicious osteoblastic metastatic bone disease. Suspicious pelvic lymphadenopathy, catalogued above. Mild colonic stool burden. No bowel obstruction. Relation of prostate gland and rectum limited by modality.  - 3/9 US Renal Bladder- Enlarged prostate with a 2.5 x 2.8 cm nodule/mass in the medial portion of the prostate. Mild right and moderate left hydronephrosis.  - c/w sodium bicarbonate   - IVF   - Flomax and finasteride   - 3/10 Urology follow up- f/u IR for bone biopsy; cont with thrasher catheter-- had 950cc output on insertion; repeat RBUS in 72 hrs from insertion to reassess hydro  - 3/10 Heme/Onc Consult- Agree with Bone scan; Will need tissue biopsy --> urology consult for cystoscopy and Biopsy of bladder mass extravasating into bladder. Will prefer soft tissue biopsy over bone biopsy. 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 Would like to get a biopsy to r/o small cell features to it given extensive disease with only moderately elevated PSA.    #Constipation  - c/w bowel regimen     #HTN  - c/w Losartan 100 mg qd      #HLD  - c/w Atorvastatin 20 mg qhs      MISC  #DVT prophylaxis: SCDs  #GI prophylaxis: Pantoprazole  #Diet: DASH/TLC  #Activity: AAT  #Code status: Full Code  #Disposition: med/surg, eventual dispo home   72yo Male with pmh of BPH (on finasteride), HTN, HLD presented to the ER with lower abdominal pain and constipation for the past few days. The patient follows with Urologist Dr. Perez and private Oncologist in [place]. The patient mentioned that he is not aware of the prostate cancer diagnosis and was being managed for BPH by urology. He mentioned that he had a bladder stone removal in 2021 and had colonoscopy recently in Feb which showed 5 polyps. The patient has increase in urinary frequency and nocturia but denies any dysuria and attributes it to Finasteride. He has FHx ( 2 brothers) of prostate cancer. The patient mentioned that he would like to see his private oncologist rather than get treatment with oncology team here. The patient has been making urine and Thrasher was in ED as requested by urology and has mild hematuria (likely due to traumatic Thrasher).     #Suspected metastatic prostate cancer  #Hx of BPH  #Post renal JIGNESH 2/2 acute urinary retention   #s/p IV contrast   - PSA 9.73  - UA negative  - f/u Bone scan  - patient wants to be treated with his private oncologist Dr. Larry Perez  - CT A/P: Left lateral urinary bladder wall masslike thickening, contiguous with prostate, with extravesicular tumor infiltration. Mass at region of left UVJ, with resultant moderate left hydroureteronephrosis. Urinary bladder distended. Right mild hydronephrosis or fullness. BPH with median lobe hypertrophy. 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 Findings compatible with metastatic neoplasm; prostate cancer invading bladder favored over primary urinary bladder neoplasm given suspicious osteoblastic metastatic bone disease. Suspicious pelvic lymphadenopathy, catalogued above. Mild colonic stool burden. No bowel obstruction. Relation of prostate gland and rectum limited by modality.  - 3/9 US Renal Bladder- Enlarged prostate with a 2.5 x 2.8 cm nodule/mass in the medial portion of the prostate. Mild right and moderate left hydronephrosis.  - c/w sodium bicarbonate   - IVF   - Flomax and finasteride   - 3/10 Urology follow up- cont with thrasher catheter-- had 950cc output on insertion; repeat RBUS in 72 hrs from insertion to reassess hydro  - IR contacted for bone biopsy --> patient can be scheduled for bone biopsy outpatient  - 3/10 Heme/Onc Consult- Agree with Bone scan; Will need tissue biopsy --> urology consult for cystoscopy and Biopsy of bladder mass extravasating into bladder. Will prefer soft tissue biopsy over bone biopsy. 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 Would like to get a biopsy to r/o small cell features to it given extensive disease with only moderately elevated PSA.  - 3/11 - pending urology f/u for bladder mass biopsy  - 3/11- urine dark brown, no new blood noted    #Constipation  - c/w bowel regimen     #HTN  - c/w Losartan 100 mg qd    #HLD  - c/w Atorvastatin 20 mg qhs    MISC  #DVT prophylaxis: SCDs  #GI prophylaxis: Pantoprazole  #Diet: DASH/TLC  #Activity: AAT  #Code status: Full Code  #Disposition: med/surg, eventual dispo home

## 2024-03-11 NOTE — PROGRESS NOTE ADULT - ATTENDING COMMENTS
72yo Male with pmh of BPH (on finasteride), HTN, HLD presented to the ER with lower abdominal pain and constipation for the past few days. The patient follows with Urologist Dr. Perez and private Oncologist in [place]. The patient mentioned that he is not aware of the prostate cancer diagnosis and was being managed for BPH by urology. He mentioned that he had a bladder stone removal in 2021 and had colonoscopy recently in Feb which showed 5 polyps. The patient has increase in urinary frequency and nocturia but denies any dysuria and attributes it to Finasteride. He has FHx ( 2 brothers) of prostate cancer. The patient mentioned that he would like to see his private oncologist rather than get treatment with oncology team here. The patient has been making urine and Hensley was in ED as requested by urology and has mild hematuria (likely due to traumatic Hensley).       # Suspected metastatic prostate cancer  # Hx of BPH  # Post renal JIGNESH 2/2 acute urinary retention   # s/p IV contrast   - CT A/P: Left lateral urinary bladder wall masslike thickening, contiguous with prostate, with extravesicular tumor infiltration. Mass at region of left UVJ, with resultant moderate left hydroureteronephrosis. Urinary bladder distended. Right mild hydronephrosis or fullness. BPH with median lobe hypertrophy. 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 Findings compatible with metastatic neoplasm; prostate cancer invading bladder favored over primary urinary bladder neoplasm given suspicious osteoblastic metastatic bone disease. Suspicious pelvic lymphadenopathy, catalogued above. Mild colonic stool burden. No bowel obstruction. Relation of prostate gland and rectum limited by modality.  - PSA 9  - UA negative  - f/u Bone scan  - patient wants to be treated with his private oncologist Dr. Larry Perez  - c/w sodium bicarbonate   - IVF   - Flomax and finasteride     # Constipation  - c/w bowel regimen     # HTN  - Hold ARB   - nifedipine     # HLD  - c/w Lipitor    DVT PPx - SCD   GI PPx - Protonix    Pending:  urology f/u for biopsy  Plan of care d/w pt and family   dispo: home

## 2024-03-12 ENCOUNTER — TRANSCRIPTION ENCOUNTER (OUTPATIENT)
Age: 74
End: 2024-03-12

## 2024-03-12 LAB
ALBUMIN SERPL ELPH-MCNC: 3.5 G/DL — SIGNIFICANT CHANGE UP (ref 3.5–5.2)
ALP SERPL-CCNC: 545 U/L — HIGH (ref 30–115)
ALT FLD-CCNC: 13 U/L — SIGNIFICANT CHANGE UP (ref 0–41)
ANION GAP SERPL CALC-SCNC: 11 MMOL/L — SIGNIFICANT CHANGE UP (ref 7–14)
APTT BLD: 30.4 SEC — SIGNIFICANT CHANGE UP (ref 27–39.2)
AST SERPL-CCNC: 12 U/L — SIGNIFICANT CHANGE UP (ref 0–41)
BASOPHILS # BLD AUTO: 0.04 K/UL — SIGNIFICANT CHANGE UP (ref 0–0.2)
BASOPHILS NFR BLD AUTO: 0.3 % — SIGNIFICANT CHANGE UP (ref 0–1)
BILIRUB SERPL-MCNC: 0.8 MG/DL — SIGNIFICANT CHANGE UP (ref 0.2–1.2)
BUN SERPL-MCNC: 26 MG/DL — HIGH (ref 10–20)
CALCIUM SERPL-MCNC: 8.2 MG/DL — LOW (ref 8.4–10.5)
CHLORIDE SERPL-SCNC: 107 MMOL/L — SIGNIFICANT CHANGE UP (ref 98–110)
CO2 SERPL-SCNC: 21 MMOL/L — SIGNIFICANT CHANGE UP (ref 17–32)
CREAT SERPL-MCNC: 1.7 MG/DL — HIGH (ref 0.7–1.5)
EGFR: 42 ML/MIN/1.73M2 — LOW
EOSINOPHIL # BLD AUTO: 0.27 K/UL — SIGNIFICANT CHANGE UP (ref 0–0.7)
EOSINOPHIL NFR BLD AUTO: 2.1 % — SIGNIFICANT CHANGE UP (ref 0–8)
GLUCOSE SERPL-MCNC: 100 MG/DL — HIGH (ref 70–99)
HCT VFR BLD CALC: 32.1 % — LOW (ref 42–52)
HGB BLD-MCNC: 10.9 G/DL — LOW (ref 14–18)
IMM GRANULOCYTES NFR BLD AUTO: 1 % — HIGH (ref 0.1–0.3)
INR BLD: 1.2 RATIO — SIGNIFICANT CHANGE UP (ref 0.65–1.3)
LYMPHOCYTES # BLD AUTO: 1.35 K/UL — SIGNIFICANT CHANGE UP (ref 1.2–3.4)
LYMPHOCYTES # BLD AUTO: 10.3 % — LOW (ref 20.5–51.1)
MAGNESIUM SERPL-MCNC: 2.2 MG/DL — SIGNIFICANT CHANGE UP (ref 1.8–2.4)
MCHC RBC-ENTMCNC: 30.9 PG — SIGNIFICANT CHANGE UP (ref 27–31)
MCHC RBC-ENTMCNC: 34 G/DL — SIGNIFICANT CHANGE UP (ref 32–37)
MCV RBC AUTO: 90.9 FL — SIGNIFICANT CHANGE UP (ref 80–94)
MONOCYTES # BLD AUTO: 1.3 K/UL — HIGH (ref 0.1–0.6)
MONOCYTES NFR BLD AUTO: 9.9 % — HIGH (ref 1.7–9.3)
NEUTROPHILS # BLD AUTO: 9.98 K/UL — HIGH (ref 1.4–6.5)
NEUTROPHILS NFR BLD AUTO: 76.4 % — HIGH (ref 42.2–75.2)
NRBC # BLD: 0 /100 WBCS — SIGNIFICANT CHANGE UP (ref 0–0)
PLATELET # BLD AUTO: 193 K/UL — SIGNIFICANT CHANGE UP (ref 130–400)
PMV BLD: 10.4 FL — SIGNIFICANT CHANGE UP (ref 7.4–10.4)
POTASSIUM SERPL-MCNC: 4.1 MMOL/L — SIGNIFICANT CHANGE UP (ref 3.5–5)
POTASSIUM SERPL-SCNC: 4.1 MMOL/L — SIGNIFICANT CHANGE UP (ref 3.5–5)
PROT SERPL-MCNC: 5.9 G/DL — LOW (ref 6–8)
PROTHROM AB SERPL-ACNC: 13.7 SEC — HIGH (ref 9.95–12.87)
RBC # BLD: 3.53 M/UL — LOW (ref 4.7–6.1)
RBC # FLD: 12.9 % — SIGNIFICANT CHANGE UP (ref 11.5–14.5)
SODIUM SERPL-SCNC: 139 MMOL/L — SIGNIFICANT CHANGE UP (ref 135–146)
WBC # BLD: 13.07 K/UL — HIGH (ref 4.8–10.8)
WBC # FLD AUTO: 13.07 K/UL — HIGH (ref 4.8–10.8)

## 2024-03-12 PROCEDURE — 99448 NTRPROF PH1/NTRNET/EHR 21-30: CPT

## 2024-03-12 PROCEDURE — 76770 US EXAM ABDO BACK WALL COMP: CPT | Mod: 26

## 2024-03-12 PROCEDURE — 99239 HOSP IP/OBS DSCHRG MGMT >30: CPT

## 2024-03-12 PROCEDURE — 99232 SBSQ HOSP IP/OBS MODERATE 35: CPT

## 2024-03-12 RX ORDER — POLYETHYLENE GLYCOL 3350 17 G/17G
17 POWDER, FOR SOLUTION ORAL
Qty: 510 | Refills: 3
Start: 2024-03-12 | End: 2024-07-09

## 2024-03-12 RX ORDER — TAMSULOSIN HYDROCHLORIDE 0.4 MG/1
1 CAPSULE ORAL
Qty: 30 | Refills: 3
Start: 2024-03-12 | End: 2024-07-09

## 2024-03-12 RX ORDER — CHLORHEXIDINE GLUCONATE 213 G/1000ML
1 SOLUTION TOPICAL DAILY
Refills: 0 | Status: DISCONTINUED | OUTPATIENT
Start: 2024-03-12 | End: 2024-03-12

## 2024-03-12 RX ADMIN — POLYETHYLENE GLYCOL 3350 17 GRAM(S): 17 POWDER, FOR SOLUTION ORAL at 06:03

## 2024-03-12 RX ADMIN — CHLORHEXIDINE GLUCONATE 1 APPLICATION(S): 213 SOLUTION TOPICAL at 12:55

## 2024-03-12 RX ADMIN — PANTOPRAZOLE SODIUM 40 MILLIGRAM(S): 20 TABLET, DELAYED RELEASE ORAL at 06:03

## 2024-03-12 RX ADMIN — SODIUM CHLORIDE 75 MILLILITER(S): 9 INJECTION, SOLUTION INTRAVENOUS at 06:05

## 2024-03-12 RX ADMIN — Medication 650 MILLIGRAM(S): at 06:03

## 2024-03-12 RX ADMIN — Medication 60 MILLIGRAM(S): at 06:03

## 2024-03-12 RX ADMIN — FINASTERIDE 5 MILLIGRAM(S): 5 TABLET, FILM COATED ORAL at 12:55

## 2024-03-12 RX ADMIN — Medication 650 MILLIGRAM(S): at 12:55

## 2024-03-12 NOTE — PROGRESS NOTE ADULT - PROVIDER SPECIALTY LIST ADULT
Internal Medicine
Urology
Internal Medicine
Intervent Radiology
Intervent Radiology
Internal Medicine
Urology

## 2024-03-12 NOTE — DISCHARGE NOTE PROVIDER - PROVIDER TOKENS
PROVIDER:[TOKEN:[[id number]],FOLLOWUP:[1 week],ESTABLISHEDPATIENT:[T]] PROVIDER:[TOKEN:[[id number]],FOLLOWUP:[1 week],ESTABLISHEDPATIENT:[T]],PROVIDER:[TOKEN:[[id number]],FOLLOWUP:[2 weeks],ESTABLISHEDPATIENT:[T]],PROVIDER:[TOKEN:[[id number]],FOLLOWUP:[2 weeks]],PROVIDER:[TOKEN:[[id number]],SCHEDULEDAPPT:[03/15/2024]]

## 2024-03-12 NOTE — PROGRESS NOTE ADULT - SUBJECTIVE AND OBJECTIVE BOX
UROLOGY DAILY PROGRESS NOTE    Pt is a 73y M admitted with abdominal pain, constipation, retention, hydronephrosis and CT findings compatible with metastatic neoplasm, elevated PSA. Patient seen and examined at bedside. Patient doing well, offers no complaints at this time.     MEDICATIONS  (STANDING):  atorvastatin 20 milliGRAM(s) Oral at bedtime  chlorhexidine 2% Cloths 1 Application(s) Topical daily  finasteride 5 milliGRAM(s) Oral daily  lactated ringers. 1000 milliLiter(s) (75 mL/Hr) IV Continuous <Continuous>  NIFEdipine XL 60 milliGRAM(s) Oral daily  pantoprazole    Tablet 40 milliGRAM(s) Oral before breakfast  polyethylene glycol 3350 17 Gram(s) Oral two times a day  senna 2 Tablet(s) Oral at bedtime  sodium bicarbonate 650 milliGRAM(s) Oral three times a day  tamsulosin 0.4 milliGRAM(s) Oral at bedtime    MEDICATIONS  (PRN):  acetaminophen     Tablet .. 650 milliGRAM(s) Oral every 6 hours PRN Temp greater or equal to 38C (100.4F), Mild Pain (1 - 3)  aluminum hydroxide/magnesium hydroxide/simethicone Suspension 30 milliLiter(s) Oral every 4 hours PRN Dyspepsia  melatonin 3 milliGRAM(s) Oral at bedtime PRN Insomnia  ondansetron Injectable 4 milliGRAM(s) IV Push every 8 hours PRN Nausea and/or Vomiting      REVIEW OF SYSTEMS   [x] A ten-point review of systems was otherwise negative except as noted.    Vital Signs Last 24 Hrs  T(C): 37.4 (12 Mar 2024 05:18), Max: 37.7 (11 Mar 2024 20:22)  T(F): 99.3 (12 Mar 2024 05:18), Max: 99.8 (11 Mar 2024 20:22)  HR: 97 (12 Mar 2024 05:18) (89 - 97)  BP: 137/84 (12 Mar 2024 05:18) (137/84 - 150/80)  RR: 18 (12 Mar 2024 05:18) (18 - 19)  SpO2: 97% (12 Mar 2024 10:09) (97% - 98%)    Parameters below as of 12 Mar 2024 10:09  Patient On (Oxygen Delivery Method): room air        PHYSICAL EXAM:    GEN: NAD, well-developed, awake and alert.  SKIN: Good color, non diaphoretic.  HEENT: NC/AT.  RESP: No dyspnea, non-labored breathing. No use of accessory muscles.  CARDIO: +S1/S2  ABDO: Soft, NT/ND, no palpable bladder, no suprapubic tenderness  BACK: No CVAT B/L  :  + Indwelling thrasher in place, draining clear yellow urine, 1300cc noted in urometer bag       I&O's Summary    11 Mar 2024 07:01  -  12 Mar 2024 07:00  --------------------------------------------------------  IN: 0 mL / OUT: 4300 mL / NET: -4300 mL        LABS:                        10.9   13.07 )-----------( 193      ( 12 Mar 2024 07:23 )             32.1     03-12    139  |  107  |  26<H>  ----------------------------<  100<H>  4.1   |  21  |  1.7<H>    Ca    8.2<L>      12 Mar 2024 07:23  Mg     2.2     03-12    TPro  5.9<L>  /  Alb  3.5  /  TBili  0.8  /  DBili  x   /  AST  12  /  ALT  13  /  AlkPhos  545<H>  03-12    PT/INR - ( 12 Mar 2024 11:48 )   PT: 13.70 sec;   INR: 1.20 ratio         PTT - ( 12 Mar 2024 11:48 )  PTT:30.4 sec  Urinalysis Basic - ( 12 Mar 2024 07:23 )    Color: x / Appearance: x / SG: x / pH: x  Gluc: 100 mg/dL / Ketone: x  / Bili: x / Urobili: x   Blood: x / Protein: x / Nitrite: x   Leuk Esterase: x / RBC: x / WBC x   Sq Epi: x / Non Sq Epi: x / Bacteria: x            RADIOLOGY & ADDITIONAL STUDIES:  < from: US Kidney and Bladder (03.12.24 @ 08:10) >  ACC: [id number] EXAM:  US KIDNEYS AND BLADDER   ORDERED BY: ERIKA JAMES     PROCEDURE DATE:  03/12/2024          INTERPRETATION:  CLINICAL INFORMATION: Difficulty urinating    COMPARISON: Ultrasound dated March 19, 2024. CT scan dated March 8, 2024.    TECHNIQUE: Sonography of the kidneys and bladder.    FINDINGS:    Right kidney: 10.4 cm. 1.6 cm parenchymal cyst.    Left kidney:  10.3 cm. Mild hydronephrosis.    Urinary bladder: Thrasher catheter is seen within the urinary bladder.   Urinary bladder has 300 cc of urine in it.    The prostate is enlarged, measuring greater than 110 cc.  IMPRESSION:    Mild left-sided hydronephrosis without change.    Enlarged prostate.    Thrasher catheter within the urinary bladder with residual urine within.        --- End of Report ---    STEFFANIE QUINONES MD; Attending Interventional Radiologist  This document has been electronically signed. Mar 12 2024  8:12AM    < end of copied text >

## 2024-03-12 NOTE — PROGRESS NOTE ADULT - ASSESSMENT
74yo Male with pmh of BPH (on finasteride), HTN, HLD presented to the ER with lower abdominal pain and constipation for the past few days. The patient follows with Urologist Dr. Perez and private Oncologist in [place]. The patient mentioned that he is not aware of the prostate cancer diagnosis and was being managed for BPH by urology. He mentioned that he had a bladder stone removal in 2021 and had colonoscopy recently in Feb which showed 5 polyps. The patient has increase in urinary frequency and nocturia but denies any dysuria and attributes it to Finasteride. He has FHx ( 2 brothers) of prostate cancer. The patient mentioned that he would like to see his private oncologist rather than get treatment with oncology team here. The patient has been making urine and Thrasher was in ED as requested by urology and has mild hematuria (likely due to traumatic Thrasher).     #Suspected metastatic prostate cancer  #Hx of BPH  #Post renal JIGNESH 2/2 acute urinary retention   #s/p IV contrast   - PSA 9.73  - UA negative  - f/u Bone scan  - patient wants to be treated with his private oncologist Dr. Larry Perez  - CT A/P: Left lateral urinary bladder wall masslike thickening, contiguous with prostate, with extravesicular tumor infiltration. Mass at region of left UVJ, with resultant moderate left hydroureteronephrosis. Urinary bladder distended. Right mild hydronephrosis or fullness. BPH with median lobe hypertrophy. 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 Findings compatible with metastatic neoplasm; prostate cancer invading bladder favored over primary urinary bladder neoplasm given suspicious osteoblastic metastatic bone disease. Suspicious pelvic lymphadenopathy, catalogued above. Mild colonic stool burden. No bowel obstruction. Relation of prostate gland and rectum limited by modality.  - 3/9 US Renal Bladder- Enlarged prostate with a 2.5 x 2.8 cm nodule/mass in the medial portion of the prostate. Mild right and moderate left hydronephrosis.  - c/w sodium bicarbonate   - IVF   - Flomax and finasteride   - 3/10 Urology follow up- cont with thrasher catheter-- had 950cc output on insertion; repeat RBUS in 72 hrs from insertion to reassess hydro  - IR contacted for bone biopsy --> patient can be scheduled for bone biopsy outpatient  - 3/10 Heme/Onc Consult- Agree with Bone scan; Will need tissue biopsy --> urology consult for cystoscopy and Biopsy of bladder mass extravasating into bladder. Will prefer soft tissue biopsy over bone biopsy. 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 Would like to get a biopsy to r/o small cell features to it given extensive disease with only moderately elevated PSA.  - 3/11 - pending urology f/u for bladder mass biopsy  - 3/11- urine dark brown, no new blood noted    #Constipation  - c/w bowel regimen     #HTN  - c/w Losartan 100 mg qd    #HLD  - c/w Atorvastatin 20 mg qhs    MISC  #DVT prophylaxis: SCDs  #GI prophylaxis: Pantoprazole  #Diet: DASH/TLC  #Activity: AAT  #Code status: Full Code  #Disposition: med/surg, eventual dispo home

## 2024-03-12 NOTE — DISCHARGE NOTE NURSING/CASE MANAGEMENT/SOCIAL WORK - NSDCPEFALRISK_GEN_ALL_CORE
For information on Fall & Injury Prevention, visit: [email or web address] OR  [email or web address] OR  https://www.cdc.gov/steadi/patient.html

## 2024-03-12 NOTE — DISCHARGE NOTE PROVIDER - CARE PROVIDER_API CALL
Larry Perez  Urology  [address] [address]  Phone: [phone]  Fax: [phone]  Established Patient  Follow Up Time: 1 week   Larry Perez  Urology  [address], [place] [address]  Phone: [phone]  Fax: [phone]  Established Patient  Follow Up Time: 1 week    Rao Dumont  Internal Medicine  [address] [place] [id number]  Phone: [phone]  Fax: [phone]  Established Patient  Follow Up Time: 2 weeks    Dakotah Cheung  Medical Oncology  [address] [place] [id number]  Phone: [phone]  Fax: [phone]  Follow Up Time: 2 weeks    Demarco Faria  Vascular/Intervent Radiology  [address] [place] [id number]  Phone: [phone]  Fax: [phone]  Scheduled Appointment: 03/15/2024

## 2024-03-12 NOTE — PROGRESS NOTE ADULT - ASSESSMENT
Pt is a 73y M admitted with abdominal pain, constipation, retention, hydronephrosis and CT findings compatible with metastatic neoplasm, elevated PSA.     Plan:   Case discussed with Dr. Aragon, plan as follows:   - Continue thrasher catheter at this time as patient had large output , d/c with leg bag education  - Trend Cr, Cr 1.7   - IR for bone biopsy ( as per discussion b/w Dr. Faria & Dr. Aragon)   - f/u Heme/onc recs   - F/u OP with Urology (either private urologist or patient's private urologist) for further evaluation and outpatient TOV  - No further acute  intervention at this time   - discussed with primary team   - d/w attending  Pt is a 73y M admitted with abdominal pain, constipation, retention, hydronephrosis and CT findings compatible with metastatic neoplasm, elevated PSA.     Plan:   Case discussed with Dr. Aragon, plan as follows:   - Continue thrasher catheter at this time as patient had large output , d/c with leg bag education  - Trend Cr, Cr 1.7   - IR for bone biopsy ( as per discussion b/w Dr. Faria & Dr. Aragon)   - f/u Heme/onc recs   - F/u OP with Urology (either private urologist or patient's private urologist) for further evaluation and outpatient TOV  - No further acute  intervention at this time   - discussed with primary team   - d/w attending     Addendum   Patient seen and examined at bedside with Dr. Veronica. Note to be reviewed and amended where necessary tomorrow secondary to scheduling conflict. All question asked by patient and patient's son at bedside. Discussed that patient will need to obtain bone biopsy to ascertain future management.  Pt is a 73y M admitted with abdominal pain, constipation, retention, hydronephrosis and CT findings compatible with metastatic neoplasm, elevated PSA.     Plan:   Case discussed with Dr. Aragon, plan as follows:   - Continue thrasher catheter at this time as patient had large output , d/c with leg bag education  - Trend Cr, Cr 1.7   - IR for bone biopsy ( as per discussion b/w Dr. Faria & Dr. Aragon)   - f/u Heme/onc recs   - F/u OP with Urology (either private urologist or patient's private urologist) for further evaluation and outpatient TOV  - No further acute  intervention at this time   - discussed with primary team   - d/w attending     Addendum   Patient seen and examined at bedside with Dr. Veronica. Note to be reviewed and amended where necessary tomorrow secondary to scheduling conflict. All question asked by patient and patient's son at bedside. Discussed that patient will need to obtain bone biopsy to ascertain future management.     I met with the patient and his son answered the questions from both which were good questions but will need to be answered over time.  Affect is he has a mass by the UVJ which is partially possibly obstructing the left kidney his creatinine is slightly elevated now 1.7.  The mass may be bladder versus prostate enlargement and he also has a lesion in the bone.  If the bone is biopsied and turned out to be prostate then we do not need to biopsy the bladder prostate lesion to know what it is if the bone biopsy is a second primary that we can always go away and then check the bladder and prostate transurethrally.  If we go and do a transurethral procedure we still will know what the bone mass is as though theoretically it would be prostate it could be a different primary.  Therefore as an outpatient he will get the bone biopsy and depending on that we consider transurethral biopsy.  Family and the patient understands and accepts and he will follow-up either with a private urologist or members of our group after the biopsy is done.  If he runs into trouble he knows our number and should feel free to contact us.

## 2024-03-12 NOTE — DISCHARGE NOTE PROVIDER - HOSPITAL COURSE
Pt is a 72yo Male with PMHx of BPH (on finasteride), HTN, HLD presented to the ER with lower abdominal pain and constipation for the past few days. The patient follows with Urologist Dr. Perez and private Oncologist in [place]. The patient mentioned that he is not aware of the prostate cancer diagnosis and was being managed for BPH by urology. He mentioned that he had a bladder stone removal in 2021 and had colonoscopy recently in Feb which showed 5 polyps. The patient has increase in urinary frequency and nocturia but denies any dysuria and attributes it to Finasteride. He has FHx ( 2 brothers) of prostate cancer. The patient mentioned that he would like to see his private oncologist rather than get treatment with oncology team here. The patient has been making urine and Thrasher was in ED as requested by urology and has mild hematuria (likely due to traumatic Thrasher).    Pt denies history of smoking. Pt denies fever, chills, N/V, suprapubic pain, flank pain, dysuria.  VS on admission normal.   Admitted for further management of hematuria.     Heme/Onc Consult: Bone scan performed; urology consult for cystoscopy and Biopsy of bladder mass extravasating into bladder. Will prefer soft tissue biopsy over bone biopsy. 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 Would like to get a biopsy to r/o small cell features to it given extensive disease with only moderately elevated PSA.    Urology consult: PSA 9.73, f/u IR for bone biopsy, Cont with thrasher catheter-- had 950cc output on insertion, Repeat RBUS in 72 hrs from insertion to reassess hydro  - TOV will be attempted after discharge; will be dc with thrasher catheter  - urology felt prostate biopsy would not be as useful as bone biopsy    IR consult: bone biopsy would be performed outpatient; will schedule patient.    Patient is medically stable for discharge home, will follow up with urology, IR, heme/onc, and PCP outpatient. Pt is a 74yo Male with PMHx of BPH (on finasteride), HTN, HLD presented to the ER with lower abdominal pain and constipation for the past few days. The patient follows with Urologist Dr. Perez and private Oncologist in [place]. The patient mentioned that he is not aware of the prostate cancer diagnosis and was being managed for BPH by urology. He mentioned that he had a bladder stone removal in 2021 and had colonoscopy recently in Feb which showed 5 polyps. The patient has increase in urinary frequency and nocturia but denies any dysuria and attributes it to Finasteride. He has FHx ( 2 brothers) of prostate cancer. The patient mentioned that he would like to see his private oncologist rather than get treatment with oncology team here. The patient has been making urine and Thrasher was in ED as requested by urology and has mild hematuria (likely due to traumatic Thrasher).    Pt denies history of smoking. Pt denies fever, chills, N/V, suprapubic pain, flank pain, dysuria.  VS on admission normal.   Admitted for further management of hematuria.     CT Abd/Pelvis:   1. Left lateral urinary bladder wall masslike thickening, contiguous with prostate, with extravesicular tumor infiltration. Mass at region of left   UVJ, with resultant moderate left hydroureteronephrosis. Urinary bladder distended. Right mild hydronephrosis or fullness. BPH with median lobe   hypertrophy. 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 Findings compatible with metastatic neoplasm; prostate   cancer invading bladder favored over primary urinary bladder neoplasm given suspicious osteoblastic metastatic bone disease.. Correlation with PSA recommended.    2. Suspicious pelvic lymphadenopathy, catalogued above.    Heme/Onc Consult: Bone scan performed; urology consult for cystoscopy and Biopsy of bladder mass extravasating into bladder. Will prefer soft tissue biopsy over bone biopsy. 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 Would like to get a biopsy to r/o small cell features to it given extensive disease with only moderately elevated PSA.    Urology consult: PSA 9.73, f/u IR for bone biopsy, Cont with thrasher catheter-- had 950cc output on insertion, Repeat RBUS in 72 hrs from insertion to reassess hydro  - TOV will be attempted after discharge; will be dc with thrasher catheter  - urology felt prostate biopsy would not be as useful as bone biopsy    IR consult: bone biopsy would be performed outpatient; will schedule patient.    Patient is medically stable for discharge home, will follow up with urology, IR, heme/onc, and PCP outpatient. Pt is a 72yo Male with PMHx of BPH (on finasteride), HTN, HLD presented to the ER with lower abdominal pain and constipation for the past few days. The patient follows with Urologist Dr. Perez and private Oncologist in [place]. The patient mentioned that he is not aware of the prostate cancer diagnosis and was being managed for BPH by urology. He mentioned that he had a bladder stone removal in 2021 and had colonoscopy recently in Feb which showed 5 polyps. The patient has increase in urinary frequency and nocturia but denies any dysuria and attributes it to Finasteride. He has FHx ( 2 brothers) of prostate cancer. The patient mentioned that he would like to see his private oncologist rather than get treatment with oncology team here. The patient has been making urine and Thrasher was in ED as requested by urology and has mild hematuria (likely due to traumatic Thrasher).    Pt denies history of smoking. Pt denies fever, chills, N/V, suprapubic pain, flank pain, dysuria.  VS on admission normal.   Admitted for further management of hematuria.     CT Abd/Pelvis:   1. Left lateral urinary bladder wall masslike thickening, contiguous with prostate, with extravesicular tumor infiltration. Mass at region of left   UVJ, with resultant moderate left hydroureteronephrosis. Urinary bladder distended. Right mild hydronephrosis or fullness. BPH with median lobe   hypertrophy. 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 Findings compatible with metastatic neoplasm; prostate   cancer invading bladder favored over primary urinary bladder neoplasm given suspicious osteoblastic metastatic bone disease.. Correlation with PSA recommended.    2. Suspicious pelvic lymphadenopathy, catalogued above.    Heme/Onc Consult: Bone scan performed; urology consult for cystoscopy and Biopsy of bladder mass extravasating into bladder. Will prefer soft tissue biopsy over bone biopsy. 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 Would like to get a biopsy to r/o small cell features to it given extensive disease with only moderately elevated PSA.    Urology consult: PSA 9.73, f/u IR for bone biopsy, Cont with thrasher catheter-- had 950cc output on insertion, Repeat RBUS in 72 hrs from insertion to reassess hydro  - TOV will be attempted after discharge; will be dc with thrasher catheter  - urology felt prostate biopsy would not be as useful as bone biopsy    IR consult: bone biopsy would be performed outpatient; will schedule patient for Friday 3/15.    Patient is medically stable for discharge home, will follow up with urology Dr. Perez, IR Dr. Faria, heme/onc (either private or Dr. Ling), and PCP Dr. Dumont outpatient. Pt is a 74yo Male with PMHx of BPH (on finasteride), HTN, HLD presented to the ER with lower abdominal pain and constipation for the past few days. The patient follows with Urologist Dr. Perez and private Oncologist in [place]. The patient mentioned that he is not aware of the prostate cancer diagnosis and was being managed for BPH by urology. He mentioned that he had a bladder stone removal in 2021 and had colonoscopy recently in Feb which showed 5 polyps. The patient has increase in urinary frequency and nocturia but denies any dysuria and attributes it to Finasteride. He has FHx ( 2 brothers) of prostate cancer. The patient mentioned that he would like to see his private oncologist rather than get treatment with oncology team here. The patient has been making urine and Thrasher was in ED as requested by urology and has mild hematuria (likely due to traumatic Thrasher).    Pt denies history of smoking. Pt denies fever, chills, N/V, suprapubic pain, flank pain, dysuria.  VS on admission normal.   Admitted for further management of hematuria.     CT Abd/Pelvis:   1. Left lateral urinary bladder wall masslike thickening, contiguous with prostate, with extravesicular tumor infiltration. Mass at region of left   UVJ, with resultant moderate left hydroureteronephrosis. Urinary bladder distended. Right mild hydronephrosis or fullness. BPH with median lobe   hypertrophy. 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 Findings compatible with metastatic neoplasm; prostate   cancer invading bladder favored over primary urinary bladder neoplasm given suspicious osteoblastic metastatic bone disease.. Correlation with PSA recommended.    2. Suspicious pelvic lymphadenopathy, catalogued above.    Heme/Onc Consult: Bone scan; urology consult for cystoscopy and Biopsy of bladder mass extravasating into bladder. Will prefer soft tissue biopsy over bone biopsy. 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 Would like to get a biopsy to r/o small cell features to it given extensive disease with only moderately elevated PSA.  - Bone scan: Extensive multifocal increased activity throughout the bones, compatible with diffuse osseous metastatic disease.    Urology consult: PSA 9.73, f/u IR for bone biopsy, Cont with thrasher catheter-- had 950cc output on insertion, Repeat RBUS in 72 hrs from insertion to reassess hydro  - 3/12 repeat RBUS: Mild left-sided hydronephrosis without change. Enlarged prostate. Thrasher catheter within the urinary bladder with residual urine within.  - TOV will be attempted after discharge; will be dc with thrasher catheter  - urology felt prostate biopsy would not be as useful as bone biopsy    IR consult: bone biopsy would be performed outpatient; will schedule patient for Friday 3/15.    Patient is medically stable for discharge home, will follow up with urology Dr. Perez, IR Dr. Faria, heme/onc (either private or Dr. Ling), and PCP Dr. Dumont outpatient. Pt is a 74yo Male with PMHx of BPH (on finasteride), HTN, HLD presented to the ER with lower abdominal pain and constipation for the past few days. The patient follows with Urologist Dr. Perez and private Oncologist in [place]. The patient mentioned that he is not aware of the prostate cancer diagnosis and was being managed for BPH by urology. He mentioned that he had a bladder stone removal in 2021 and had colonoscopy recently in Feb which showed 5 polyps. The patient has increase in urinary frequency and nocturia but denies any dysuria and attributes it to Finasteride. He has FHx ( 2 brothers) of prostate cancer. The patient mentioned that he would like to see his private oncologist rather than get treatment with oncology team here. The patient has been making urine and Thrasher was in ED as requested by urology and has mild hematuria (likely due to traumatic Thrasher).    Pt denies history of smoking. Pt denies fever, chills, N/V, suprapubic pain, flank pain, dysuria.  VS on admission normal.   Admitted for further management of hematuria.     CT Abd/Pelvis:   1. Left lateral urinary bladder wall masslike thickening, contiguous with prostate, with extravesicular tumor infiltration. Mass at region of left   UVJ, with resultant moderate left hydroureteronephrosis. Urinary bladder distended. Right mild hydronephrosis or fullness. BPH with median lobe   hypertrophy. 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 Findings compatible with metastatic neoplasm; prostate   cancer invading bladder favored over primary urinary bladder neoplasm given suspicious osteoblastic metastatic bone disease.. Correlation with PSA recommended.    2. Suspicious pelvic lymphadenopathy, catalogued above.    Heme/Onc Consult: Bone scan; urology consult for cystoscopy and Biopsy of bladder mass extravasating into bladder. Will prefer soft tissue biopsy over bone biopsy. 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 Would like to get a biopsy to r/o small cell features to it given extensive disease with only moderately elevated PSA.  - Bone scan: Extensive multifocal increased activity throughout the bones, compatible with diffuse osseous metastatic disease.    Urology consult: PSA 9.73, f/u IR for bone biopsy, Cont with thrasher catheter-- had 950cc output on insertion, Repeat RBUS in 72 hrs from insertion to reassess hydro  - 3/12 repeat RBUS: Mild left-sided hydronephrosis without change. Enlarged prostate. Thrasher catheter within the urinary bladder with residual urine within.  - TOV will be attempted after discharge; will be dc with thrasher catheter  - urology felt prostate biopsy would not be as useful as bone biopsy    IR consult: bone biopsy would be performed outpatient; will schedule patient for Friday 3/15.  - scheduled for bone biopsy on Friday 3/15 in Interventional Radiology  - Pt will arrive at [address] [place] [address] Entrance the morning of your procedure, cannot eat or drink anything after midnight on Thursday  - will receive a call from scheduling prior to your procedure with time and further instuctions  - for questions: [phone], [phone]    Patient is medically stable for discharge home, will follow up with urology Dr. Perez, IR Dr. Faria, heme/onc (either private or Dr. Ling), and PCP Dr. Dumont outpatient.

## 2024-03-12 NOTE — DISCHARGE NOTE PROVIDER - CARE PROVIDERS DIRECT ADDRESSES
[email or web address]

## 2024-03-12 NOTE — PROGRESS NOTE ADULT - NS ATTEND AMEND GEN_ALL_CORE FT
I personally saw and examined the patient, reviewed the chart and available data. I discussed the situation with the patient and His son as well as the urology team. I also reviewed and/or amended the note as necessary.    patient seen on the day in question. Note not reviewed and cosigned until now due to scheduling issues

## 2024-03-12 NOTE — PROGRESS NOTE ADULT - SUBJECTIVE AND OBJECTIVE BOX
24H events:    Patient is a 73y old Male who presents with a chief complaint of Hydronephrosis, metastatic prostate cancer (11 Mar 2024 06:32)    Primary diagnosis of Primary prostate malignancy    Today is hospital day 3d. This morning patient was seen and examined at bedside, resting comfortably in bed.    No acute or major events overnight.    PAST MEDICAL & SURGICAL HISTORY  Prostate cancer    No significant past surgical history      SOCIAL HISTORY:  Social History:      ALLERGIES:  Allergy Status Unknown    MEDICATIONS:  STANDING MEDICATIONS  atorvastatin 20 milliGRAM(s) Oral at bedtime  finasteride 5 milliGRAM(s) Oral daily  lactated ringers. 1000 milliLiter(s) IV Continuous <Continuous>  NIFEdipine XL 60 milliGRAM(s) Oral daily  pantoprazole    Tablet 40 milliGRAM(s) Oral before breakfast  polyethylene glycol 3350 17 Gram(s) Oral two times a day  senna 2 Tablet(s) Oral at bedtime  sodium bicarbonate 650 milliGRAM(s) Oral three times a day  tamsulosin 0.4 milliGRAM(s) Oral at bedtime    PRN MEDICATIONS  acetaminophen     Tablet .. 650 milliGRAM(s) Oral every 6 hours PRN  aluminum hydroxide/magnesium hydroxide/simethicone Suspension 30 milliLiter(s) Oral every 4 hours PRN  melatonin 3 milliGRAM(s) Oral at bedtime PRN  ondansetron Injectable 4 milliGRAM(s) IV Push every 8 hours PRN    VITALS:   T(F): 99.3  HR: 97  BP: 137/84  RR: 18  SpO2: 98%    PHYSICAL EXAM:  GENERAL:   ( X ) NAD, lying in bed comfortably     (  ) obtunded     (  ) lethargic     (  ) somnolent    HEAD:   ( X ) Atraumatic     (  ) hematoma     (  ) laceration (specify location:       )     NECK:  ( X ) Supple     (  ) neck stiffness     (  ) nuchal rigidity     (  )  no JVD     (  ) JVD present ( -- cm)    HEART:  Rate -->     ( X ) normal rate     (  ) bradycardic     (  ) tachycardic  Rhythm -->     ( X ) regular     (  ) regularly irregular     (  ) irregularly irregular  Murmurs -->     (  ) normal s1s2     (  ) systolic murmur     (  ) diastolic murmur     (  ) continuous murmur      (  ) S3 present     (  ) S4 present    LUNGS:   ( X )Unlabored respirations     (  ) tachypnea  ( X ) B/L air entry     (  ) decreased breath sounds in:  (location     )    (  ) no adventitious sound     (  ) crackles     (  ) wheezing      (  ) rhonchi      (specify location:       )  (  ) chest wall tenderness (specify location:       )    ABDOMEN:   ( X ) Soft     (  ) tense   |   (  ) nondistended     (  ) distended   |   (  ) +BS     (  ) hypoactive bowel sounds     (  ) hyperactive bowel sounds  ( X ) nontender     (  ) RUQ tenderness     (  ) RLQ tenderness     (  ) LLQ tenderness     (  ) epigastric tenderness     (  ) diffuse tenderness  (  ) Splenomegaly      (  ) Hepatomegaly      (  ) Jaundice     (  ) ecchymosis     EXTREMITIES:  ( X ) Normal     (  ) Rash     (  ) ecchymosis     (  ) varicose veins      (  ) pitting edema     (  ) non-pitting edema   (  ) ulceration     (  ) gangrene:     (location:     )    NERVOUS SYSTEM:    ( X ) A&Ox3     (  ) confused     (  ) lethargic  CN II-XII:     (  ) Intact     (  ) deficits found     (Specify:     )   Upper extremities:     (  ) no sensorimotor deficits     (  ) weakness     (  ) loss of proprioception/vibration     (  ) loss of touch/temperature (specify:    )  Lower extremities:     (  ) no sensorimotor deficits     (  ) weakness     (  ) loss of proprioception/vibration     (  ) loss of touch/temperature (specify:    )    SKIN:   (X  ) No rashes or lesions     (  ) maculopapular rash     (  ) pustules     (  ) vesicles     (  ) ulcer     (  ) ecchymosis     (specify location:     )      LABS:                        10.3   11.67 )-----------( 202      ( 11 Mar 2024 08:09 )             30.3     03-11    142  |  109  |  38<H>  ----------------------------<  105<H>  4.1   |  20  |  2.1<H>    Ca    8.3<L>      11 Mar 2024 08:09  Mg     2.1     03-11    TPro  5.8<L>  /  Alb  3.7  /  TBili  0.6  /  DBili  x   /  AST  16  /  ALT  14  /  AlkPhos  551<H>  03-11      Urinalysis Basic - ( 11 Mar 2024 08:09 )    Color: x / Appearance: x / SG: x / pH: x  Gluc: 105 mg/dL / Ketone: x  / Bili: x / Urobili: x   Blood: x / Protein: x / Nitrite: x   Leuk Esterase: x / RBC: x / WBC x   Sq Epi: x / Non Sq Epi: x / Bacteria: x

## 2024-03-12 NOTE — PROGRESS NOTE ADULT - SUBJECTIVE AND OBJECTIVE BOX
Interventional Radiology Follow- Up Note    HPI:  Pt is a 74yo Male with pmh of BPH (on finasteride), HTN, HLD presented to the ER with lower abdominal pain and constipation for the past few days. The patient follows with Urologist Dr. Perez and private Oncologist in [place]. The patient mentioned that he is not aware of the prostate cancer diagnosis and was being managed for BPH by urology. He mentioned that he had a bladder stone removal in 2021 and had colonoscopy recently in Feb which showed 5 polyps. The patient has increase in urinary frequency and nocturia but denies any dysuria and attributes it to Finasteride. He has FHx ( 2 brothers) of prostate cancer. The patient mentioned that he would like to see his private oncologist rather than get treatment with oncology team here. The patient has been making urine and Hensley was in ED as requested by urology and has mild hematuria (likely due to traumatic Hensley).    Vitals: T(F): 99.3 (03-12-24 @ 05:18), Max: 99.8 (03-11-24 @ 20:22)  HR: 97 (03-12-24 @ 05:18) (89 - 97)  BP: 137/84 (03-12-24 @ 05:18) (137/84 - 150/80)  RR: 18 (03-12-24 @ 05:18) (18 - 19)  SpO2: 97% (03-12-24 @ 10:09) (97% - 98%)  Wt(kg): --    LABS:                        10.9   13.07 )-----------( 193      ( 12 Mar 2024 07:23 )             32.1     03-12    139  |  107  |  26<H>  ----------------------------<  100<H>  4.1   |  21  |  1.7<H>    Ca    8.2<L>      12 Mar 2024 07:23  Mg     2.2     03-12    TPro  5.9<L>  /  Alb  3.5  /  TBili  0.8  /  DBili  x   /  AST  12  /  ALT  13  /  AlkPhos  545<H>  03-12    PT/INR - ( 12 Mar 2024 11:48 )   PT: 13.70 sec;   INR: 1.20 ratio         PTT - ( 12 Mar 2024 11:48 )  PTT:30.4 sec      Impression: 73y Male admitted with Malignant neoplasm of prostate    ASSESSMENT/ PLAN:   72 yo male with above history.  IR consulted by Medicine team for prostate biopsy.  Imaging  results as follows:    IMPRESSION:    1. Left lateral urinary bladder wall masslike thickening, contiguous with prostate, with extravesicular tumor infiltration. Mass at region of left UVJ, with resultant moderate left hydroureteronephrosis. Urinary bladder distended. Right mild hydronephrosis or fullness. BPH with median lobe hypertrophy. 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 Findings compatible with metastatic neoplasm; prostate cancer invading bladder favored over primary urinary bladder neoplasm given suspicious osteoblastic metastatic bone disease.. Correlation with PSA recommended.    2. Suspicious pelvic lymphadenopathy, catalogued above.    3. Mild colonic stool burden. No bowel obstruction. Relation of prostate gland and rectum limited by modality.    --- End of Report ---      IR consulted on 3/9 for prostate biopsy. At that time, case was d/w Jenifer Wellington via Teams telephone.  Recommended reconsulting urology team if prostate biopsy is desired.  The pre-sacral and iliac lymphadenopathy described is not amenable to percutaneous biopsy due to lack of safe percutaneous window.  Alternatively, if one of the sclerotic bone lesions, ie) the left sacrum is desired, IR could potentially biopsy one of these lesions. As per urology note, no plan for biopsy, IR to proceed with bone biopsy as discussed with Dr. Aragon and Dr. Faria.  - plan for outpatient bone biopsy Friday 3/15  - labs WNL  - please include the following in patients discharge instructions:  - you are scheduled for bone biopsy on Friday 3/15 in Interventional Radiology  - you will arrive at [address] [place] [address] the morning of your procedure  - do not eat or drink anything after midnight on Thursday  - you will receive a call from scheduling prior to your procedure with time and further instuctions  - for questions: [phone], [phone]     Please call Interventional Radiology [phone] with any questions, concerns, or issues regarding above.

## 2024-03-12 NOTE — DISCHARGE NOTE NURSING/CASE MANAGEMENT/SOCIAL WORK - PATIENT PORTAL LINK FT
You can access the FollowMyHealth Patient Portal offered by [facility] by registering at the following website: [email or web address]. By joining [facility]’s FollowMyHealth portal, you will also be able to view your health information using other applications (apps) compatible with our system.

## 2024-03-12 NOTE — DISCHARGE NOTE PROVIDER - NSDCCPCAREPLAN_GEN_ALL_CORE_FT
PRINCIPAL DISCHARGE DIAGNOSIS  Diagnosis: Primary prostate malignancy  Assessment and Plan of Treatment: You presented to the hospital with blood in your urine. You were seen by the urology team and had a thrasher catheter placed.   You had a CT scan performed      SECONDARY DISCHARGE DIAGNOSES  Diagnosis: JIGNESH (acute kidney injury)  Assessment and Plan of Treatment:      PRINCIPAL DISCHARGE DIAGNOSIS  Diagnosis: Primary prostate malignancy  Assessment and Plan of Treatment: You presented to the hospital with blood in your urine.   You had a CT Scan performed which showed a mass in your bladder.   You were seen by the urology team and had a thrasher catheter placed. You thrasher catheter will remain in place after discharge. You will follow up with urology once outpatient for further planning of catheter removal.  You were seen by the hematology/oncology team who recommended a bone biopsy, which will be performed after discharge with the interventional radiology team.   Please follow up with IR at your scheduled appointment, and you will follow up with your urologist within one week of discharge.   Please return to the hospital ifyou have any worsening of the blood in your urine, chest pain, shortness of breath, or any other alarming symptoms.      SECONDARY DISCHARGE DIAGNOSES  Diagnosis: JIGNESH (acute kidney injury)  Assessment and Plan of Treatment:      PRINCIPAL DISCHARGE DIAGNOSIS  Diagnosis: Primary prostate malignancy  Assessment and Plan of Treatment: You presented to the hospital with blood in your urine.   You had a CT Scan performed which showed a mass in your bladder.   You were seen by the urology team and had a thrasher catheter placed. You thrasher catheter will remain in place after discharge. You will follow up with urology once outpatient for further planning of catheter removal.  You were seen by the hematology/oncology team who recommended a bone biopsy, which will be performed after discharge with the interventional radiology team:  The instructions as per IR:  - You are scheduled for bone biopsy on Friday 3/15 in Interventional Radiology  - you will arrive at [address] [place] [address] Entrance the morning of your procedure  - do not eat or drink anything after midnight on Thursday  - you will receive a call from scheduling prior to your procedure with time and further instuctions  - for questions: [phone], [phone]  Please return to the hospital ifyou have any worsening of the blood in your urine, chest pain, shortness of breath, or any other alarming symptoms.      SECONDARY DISCHARGE DIAGNOSES  Diagnosis: JIGNESH (acute kidney injury)  Assessment and Plan of Treatment:      PRINCIPAL DISCHARGE DIAGNOSIS  Diagnosis: Primary prostate malignancy  Assessment and Plan of Treatment: You presented to the hospital with blood in your urine.   You had a CT Scan performed which showed a mass in your bladder.   You were seen by the urology team and had a thrasher catheter placed. You thrasher catheter will remain in place after discharge. You will follow up with your urologist Dr. Perez after discharge for further planning of catheter removal.  You were seen by the hematology/oncology team who recommended a bone biopsy, which will be performed after discharge with the interventional radiology team on Friday, 3/15.   The instructions as per interventional radiology:  - You are scheduled for bone biopsy on Friday 3/15 in Interventional Radiology  - you will arrive at [address] [place] [address] Entrance the morning of your procedure  - do not eat or drink anything after midnight on Thursday  - you will receive a call from scheduling prior to your procedure with time and further instuctions  - for questions: [phone], [phone]  Please return to the hospital ifyou have any worsening of the blood in your urine, chest pain, shortness of breath, or any other alarming symptoms.      SECONDARY DISCHARGE DIAGNOSES  Diagnosis: JIGNESH (acute kidney injury)  Assessment and Plan of Treatment:

## 2024-03-12 NOTE — DISCHARGE NOTE PROVIDER - NSDCMRMEDTOKEN_GEN_ALL_CORE_FT
atorvastatin 20 mg oral tablet: 1 tab(s) orally once a day (at bedtime)  finasteride 5 mg oral tablet: 1 tab(s) orally once a day  olmesartan 40 mg oral tablet: 1 tab(s) orally once a day   atorvastatin 20 mg oral tablet: 1 tab(s) orally once a day (at bedtime)  finasteride 5 mg oral tablet: 1 tab(s) orally once a day  olmesartan 40 mg oral tablet: 1 tab(s) orally once a day  polyethylene glycol 3350 oral powder for reconstitution: 17 gram(s) orally once a day  tamsulosin 0.4 mg oral capsule: 1 cap(s) orally once a day (at bedtime)

## 2024-03-13 ENCOUNTER — EMERGENCY (EMERGENCY)
Facility: HOSPITAL | Age: 74
LOS: 0 days | Discharge: HOME | End: 2024-03-13
Attending: EMERGENCY MEDICINE
Payer: MEDICARE

## 2024-03-13 ENCOUNTER — NON-APPOINTMENT (OUTPATIENT)
Age: 74
End: 2024-03-13

## 2024-03-13 VITALS
RESPIRATION RATE: 18 BRPM | SYSTOLIC BLOOD PRESSURE: 129 MMHG | TEMPERATURE: 99 F | HEIGHT: 67 IN | DIASTOLIC BLOOD PRESSURE: 74 MMHG | HEART RATE: 110 BPM | WEIGHT: 145.06 LBS | OXYGEN SATURATION: 97 %

## 2024-03-13 VITALS
DIASTOLIC BLOOD PRESSURE: 62 MMHG | OXYGEN SATURATION: 99 % | RESPIRATION RATE: 18 BRPM | HEART RATE: 66 BPM | SYSTOLIC BLOOD PRESSURE: 107 MMHG | TEMPERATURE: 98 F

## 2024-03-13 DIAGNOSIS — E78.5 HYPERLIPIDEMIA, UNSPECIFIED: ICD-10-CM

## 2024-03-13 DIAGNOSIS — I10 ESSENTIAL (PRIMARY) HYPERTENSION: ICD-10-CM

## 2024-03-13 DIAGNOSIS — T83.091A OTHER MECHANICAL COMPLICATION OF INDWELLING URETHRAL CATHETER, INITIAL ENCOUNTER: ICD-10-CM

## 2024-03-13 DIAGNOSIS — N40.0 BENIGN PROSTATIC HYPERPLASIA WITHOUT LOWER URINARY TRACT SYMPTOMS: ICD-10-CM

## 2024-03-13 PROBLEM — C61 MALIGNANT NEOPLASM OF PROSTATE: Chronic | Status: ACTIVE | Noted: 2024-03-09

## 2024-03-13 PROCEDURE — 99283 EMERGENCY DEPT VISIT LOW MDM: CPT | Mod: 25

## 2024-03-13 PROCEDURE — 51702 INSERT TEMP BLADDER CATH: CPT

## 2024-03-13 PROCEDURE — 99284 EMERGENCY DEPT VISIT MOD MDM: CPT | Mod: 25

## 2024-03-13 NOTE — ED PROVIDER NOTE - CLINICAL SUMMARY MEDICAL DECISION MAKING FREE TEXT BOX
Patient initially had 14 British Hensley catheter.  Attempted to flush it but without any urinary return.  Balloon was confirmed within the bladder.  The bladder was distended on ultrasound.  A new 16 British Hensley catheter was placed.  Approximately 700 cc of urine was removed.  Urine was clear.  Patient discharged follow-up discussed with urology as patient is planned

## 2024-03-13 NOTE — ED PROVIDER NOTE - PATIENT PORTAL LINK FT
You can access the FollowMyHealth Patient Portal offered by [facility] by registering at the following website: [email or web address]. By joining [employer]’s FollowMyHealth portal, you will also be able to view your health information using other applications (apps) compatible with our system.

## 2024-03-13 NOTE — ED PROVIDER NOTE - PHYSICAL EXAMINATION
CONSTITUTIONAL: Well-developed; well-nourished; in no acute distress, nontoxic appearing  SKIN: skin exam is warm and dry,  ABD: soft; non-distended; non-tender. No Rebound, No guarding  EXT: Normal ROM. No cyanosis or edema. Dp Pulses intact.   : Hensley catheter placed at the base of the urethral meatus

## 2024-03-13 NOTE — ED PROVIDER NOTE - NSFOLLOWUPINSTRUCTIONS_ED_ALL_ED_FT
Hensley Catheter Care, Adult    A Hensley catheter is a soft, flexible tube that is placed into the bladder to drain urine. A Hensley catheter may be inserted if:    You leak urine or are not able to control when you urinate (urinary incontinence).  You are not able to urinate when you need to (urinary retention).   You had prostate surgery or surgery on the genitals.  You have certain medical conditions, such as multiple sclerosis, dementia, or a spinal cord injury.    If you are going home with a Hensley catheter in place, follow the instructions below.    TAKING CARE OF THE CATHETER   Wash your hands with soap and water.   Using mild soap and warm water on a clean washcloth:    Clean the area on your body closest to the catheter insertion site using a circular motion, moving away from the catheter. Never wipe toward the catheter because this could sweep bacteria up into the urethra and cause infection.   Remove all traces of soap. Pat the area dry with a clean towel. For males, reposition the foreskin.    Attach the catheter to your leg so there is no tension on the catheter. Use adhesive tape or a leg strap. If you are using adhesive tape, remove any sticky residue left behind by the previous tape you used.   Keep the drainage bag below the level of the bladder, but keep it off the floor.   Check throughout the day to be sure the catheter is working and urine is draining freely. Make sure the tubing does not become kinked.  Do not pull on the catheter or try to remove it. Pulling could damage internal tissues.    TAKING CARE OF THE DRAINAGE BAGS  You will be given two drainage bags to take home. One is a large overnight drainage bag, and the other is a smaller leg bag that fits underneath clothing. You may wear the overnight bag at any time, but you should never wear the smaller leg bag at night. Follow the instructions below for how to empty, change, and clean your drainage bags.    Emptying the Drainage Bag    You must empty your drainage bag when it is ?–½ full or at least 2–3 times a day.     Wash your hands with soap and water.   Keep the drainage bag below your hips, below the level of your bladder. This stops urine from going back into the tubing and into your bladder.    Hold the dirty bag over the toilet or a clean container.   Open the pour spout at the bottom of the bag and empty the urine into the toilet or container. Do not let the pour spout touch the toilet, container, or any other surface. Doing so can place bacteria on the bag, which can cause an infection.   Clean the pour spout with a gauze pad or cotton ball that has rubbing alcohol on it.   Close the pour spout.   Attach the bag to your leg with adhesive tape or a leg strap.   Wash your hands well.    Changing the Drainage Bag    Change your drainage bag once a month or sooner if it starts to smell bad or look dirty. Below are steps to follow when changing the drainage bag.     Wash your hands with soap and water.   Pinch off the rubber catheter so that urine does not spill out.   Disconnect the catheter tube from the drainage tube at the connection valve. Do not let the tubes touch any surface.    Clean the end of the catheter tube with an alcohol wipe. Use a different alcohol wipe to clean the end of the drainage tube.   Connect the catheter tube to the drainage tube of the clean drainage bag.   Attach the new bag to the leg with adhesive tape or a leg strap. Avoid attaching the new bag too tightly.    Wash your hands well.    Cleaning the Drainage Bag     Wash your hands with soap and water.   Wash the bag in warm, soapy water.   Rinse the bag thoroughly with warm water.   Fill the bag with a solution of white vinegar and water (1 cup vinegar to 1 qt warm water [.2 L vinegar to 1 L warm water]). Close the bag and soak it for 30 minutes in the solution.    Rinse the bag with warm water.    Hang the bag to dry with the pour spout open and hanging downward.   Store the clean bag (once it is dry) in a clean plastic bag.   Wash your hands well.     PREVENTING INFECTION  Wash your hands before and after handling your catheter.  Take showers daily and wash the area where the catheter enters your body. Do not take baths. Replace wet leg straps with dry ones, if this applies.  Do not use powders, sprays, or lotions on the genital area. Only use creams, lotions, or ointments as directed by your caregiver.  For females, wipe from front to back after each bowel movement.   Drink enough fluids to keep your urine clear or pale yellow unless you have a fluid restriction.   Do not let the drainage bag or tubing touch or lie on the floor.   Wear cotton underwear to absorb moisture and to keep your .    SEEK MEDICAL CARE IF:  Your urine is cloudy or smells unusually bad.  Your catheter becomes clogged.  You are not draining urine into the bag or your bladder feels full.  Your catheter starts to leak.    SEEK IMMEDIATE MEDICAL CARE IF:  You have pain, swelling, redness, or pus where the catheter enters the body.  You have pain in the abdomen, legs, lower back, or bladder.  You have a fever.  You see blood fill the catheter, or your urine is pink or red.  You have nausea, vomiting, or chills.  Your catheter gets pulled out.    MAKE SURE YOU:  Understand these instructions.  Will watch your condition.  Will get help right away if you are not doing well or get worse.    ADDITIONAL NOTES AND INSTRUCTIONS    Please follow up with your Primary MD in 24-48 hr.  Seek immediate medical care for any new/worsening signs or symptoms.

## 2024-03-13 NOTE — ED PROVIDER NOTE - OBJECTIVE STATEMENT
72yo Male with PMHx of BPH (on finasteride), HTN, HLD Recent admission for prostate malignancy with Hensley catheter placed while admitted 72yo Male with PMHx of BPH (on finasteride), HTN, HLD Recent admission for prostate malignancy with Hensley catheter placed while admitted Was a 14 [place] catheter since this afternoon was not draining.  Denies any abdominal pain.

## 2024-03-13 NOTE — ED ADULT TRIAGE NOTE - CHIEF COMPLAINT QUOTE
pt states that he had a thrasher catheter placed on friday night, hasn't had drainage in the bag since 1930

## 2024-03-13 NOTE — ED PROCEDURE NOTE - PROCEDURE NAME, MLM
Urinary Device Placement Topical Sulfur Applications Pregnancy And Lactation Text: This medication is Pregnancy Category C and has an unknown safety profile during pregnancy. It is unknown if this topical medication is excreted in breast milk.

## 2024-03-15 ENCOUNTER — RESULT REVIEW (OUTPATIENT)
Age: 74
End: 2024-03-15

## 2024-03-15 ENCOUNTER — TRANSCRIPTION ENCOUNTER (OUTPATIENT)
Age: 74
End: 2024-03-15

## 2024-03-15 ENCOUNTER — OUTPATIENT (OUTPATIENT)
Dept: OUTPATIENT SERVICES | Facility: HOSPITAL | Age: 74
LOS: 1 days | Discharge: HOME | End: 2024-03-15
Payer: MEDICARE

## 2024-03-15 VITALS
WEIGHT: 147.05 LBS | TEMPERATURE: 98 F | RESPIRATION RATE: 16 BRPM | HEART RATE: 96 BPM | DIASTOLIC BLOOD PRESSURE: 80 MMHG | OXYGEN SATURATION: 98 % | SYSTOLIC BLOOD PRESSURE: 136 MMHG | HEIGHT: 67 IN

## 2024-03-15 VITALS
HEART RATE: 89 BPM | OXYGEN SATURATION: 96 % | DIASTOLIC BLOOD PRESSURE: 71 MMHG | RESPIRATION RATE: 16 BRPM | TEMPERATURE: 98 F | SYSTOLIC BLOOD PRESSURE: 116 MMHG

## 2024-03-15 DIAGNOSIS — M89.9 DISORDER OF BONE, UNSPECIFIED: ICD-10-CM

## 2024-03-15 DIAGNOSIS — C61 MALIGNANT NEOPLASM OF PROSTATE: ICD-10-CM

## 2024-03-15 DIAGNOSIS — R59.0 LOCALIZED ENLARGED LYMPH NODES: ICD-10-CM

## 2024-03-15 PROCEDURE — 88333 PATH CONSLTJ SURG CYTO XM 1: CPT

## 2024-03-15 PROCEDURE — 88341 IMHCHEM/IMCYTCHM EA ADD ANTB: CPT | Mod: 26

## 2024-03-15 PROCEDURE — 77012 CT SCAN FOR NEEDLE BIOPSY: CPT | Mod: 26

## 2024-03-15 PROCEDURE — 88342 IMHCHEM/IMCYTCHM 1ST ANTB: CPT | Mod: 26

## 2024-03-15 PROCEDURE — 88342 IMHCHEM/IMCYTCHM 1ST ANTB: CPT

## 2024-03-15 PROCEDURE — 99153 MOD SED SAME PHYS/QHP EA: CPT

## 2024-03-15 PROCEDURE — 88333 PATH CONSLTJ SURG CYTO XM 1: CPT | Mod: 26

## 2024-03-15 PROCEDURE — 99152 MOD SED SAME PHYS/QHP 5/>YRS: CPT

## 2024-03-15 PROCEDURE — 88305 TISSUE EXAM BY PATHOLOGIST: CPT

## 2024-03-15 PROCEDURE — 88305 TISSUE EXAM BY PATHOLOGIST: CPT | Mod: 26

## 2024-03-15 PROCEDURE — 77012 CT SCAN FOR NEEDLE BIOPSY: CPT

## 2024-03-15 PROCEDURE — 20220 BONE BIOPSY TROCAR/NDL SUPFC: CPT | Mod: LT

## 2024-03-15 PROCEDURE — 88341 IMHCHEM/IMCYTCHM EA ADD ANTB: CPT

## 2024-03-15 NOTE — PROGRESS NOTE ADULT - SUBJECTIVE AND OBJECTIVE BOX
INTERVENTIONAL RADIOLOGY BRIEF-OPERATIVE NOTE    Procedure:  Percutaneous, CT-directed core needle biopsy of left iliac bone     Pre-Op Diagnosis:  Bladder/Prostate Mass, Pelvic Lymphadenopathy, Sclerotic Bone Lesions    Post-Op Diagnosis:  Same    Attending:  Giana  Resident:   None    Anesthesia (type):  [ ] General Anesthesia  [X] Sedation-- Versed, 2 mg and Fentanyl, 50 mcg, iv (in divided doses)  [ ] Spinal Anesthesia  [X] Local/Regional-- 1% Lidocaine, SQ, 15 cc    Total Face-to-Face Sedation Time:  30 minutes    Contrast:  None    Blood Loss:  3 cc    Condition:   [ ] Critical  [ ] Serious  [ ] Fair   [X] Good    Findings/Follow up Plan of Care:  Left iliac bone biopsied using an 11/13G, 10/14cm [employer] bone biopsy system under CT-guidance:  Two 13G tissue cores of the sclerotic lesion as well as ~2-3 cc of bloody material aspirated and sent for pathology.  Specimens deemed adequate by cytopathologist.  Post-biopsy images demonstrate no new hematoma.  VSS.  Patient tolerated very well, without incident.    Specimens Removed:  Two 13G tissue cores and 2-3cc aspirate    Implants:  None    Complications:  None immediate.    Disposition:  Recovery, then D/C home and f/u elvis Wolf.      Please call Interventional Radiology [phone] with any questions, concerns, or issues.

## 2024-03-15 NOTE — ASU DISCHARGE PLAN (ADULT/PEDIATRIC) - CALL YOUR DOCTOR IF YOU HAVE ANY OF THE FOLLOWING:
Bleeding that does not stop/Swelling that gets worse/Pain not relieved by Medications/Fever greater than (need to indicate Fahrenheit or Celsius)/Wound/Surgical Site with redness, or foul smelling discharge or pus/Numbness, tingling, color or temperature change to extremity/Excessive diarrhea/Inability to tolerate liquids or foods/Increased irritability or sluggishness

## 2024-03-15 NOTE — PRE PROCEDURE NOTE - PRE PROCEDURE EVALUATION
72yo Male with pmh of BPH (on finasteride), HTN, HLD presented to the ER with lower abdominal pain and constipation for the past few days. The patient follows with Urologist Dr. Perez and private Oncologist in [place]. The patient mentioned that he is not aware of the prostate cancer diagnosis and was being managed for BPH by urology. IR consulted on 3/9 for prostate biopsy. The pre-sacral and iliac lymphadenopathy described is not amenable to percutaneous biopsy due to lack of safe percutaneous window.  Alternatively, if one of the sclerotic bone lesions, ie) the left sacrum is desired, IR could potentially biopsy one of these lesions. As per urology note, no plan for biopsy, IR to proceed with bone biopsy as discussed with Dr. Aragon and Dr. Faria.    Plan for image guided bone biopsy with conscious sedation today 3/15.

## 2024-03-15 NOTE — ASU DISCHARGE PLAN (ADULT/PEDIATRIC) - NS MD DC FALL RISK RISK
For information on Fall & Injury Prevention, visit: [email or web address] OR  [email or web address] OR  https://www.cdc.gov/steadi/patient.html

## 2024-03-15 NOTE — ASU PATIENT PROFILE, ADULT - FALL HARM RISK - UNIVERSAL INTERVENTIONS
Bed in lowest position, wheels locked, appropriate side rails in place/Call bell, personal items and telephone in reach/Instruct patient to call for assistance before getting out of bed or chair/Non-slip footwear when patient is out of bed/[place] to call system/Physically safe environment - no spills, clutter or unnecessary equipment/Purposeful Proactive Rounding/Room/bathroom lighting operational, light cord in reach

## 2024-03-17 ENCOUNTER — EMERGENCY (EMERGENCY)
Facility: HOSPITAL | Age: 74
LOS: 0 days | Discharge: HOME | End: 2024-03-17
Attending: EMERGENCY MEDICINE
Payer: MEDICARE

## 2024-03-17 VITALS
HEART RATE: 111 BPM | HEIGHT: 67 IN | TEMPERATURE: 99 F | RESPIRATION RATE: 18 BRPM | SYSTOLIC BLOOD PRESSURE: 138 MMHG | WEIGHT: 147.05 LBS | DIASTOLIC BLOOD PRESSURE: 75 MMHG | OXYGEN SATURATION: 99 %

## 2024-03-17 VITALS — HEART RATE: 91 BPM

## 2024-03-17 DIAGNOSIS — R19.7 DIARRHEA, UNSPECIFIED: ICD-10-CM

## 2024-03-17 DIAGNOSIS — Z85.46 PERSONAL HISTORY OF MALIGNANT NEOPLASM OF PROSTATE: ICD-10-CM

## 2024-03-17 DIAGNOSIS — C61 MALIGNANT NEOPLASM OF PROSTATE: ICD-10-CM

## 2024-03-17 DIAGNOSIS — N40.1 BENIGN PROSTATIC HYPERPLASIA WITH LOWER URINARY TRACT SYMPTOMS: ICD-10-CM

## 2024-03-17 DIAGNOSIS — R33.8 OTHER RETENTION OF URINE: ICD-10-CM

## 2024-03-17 DIAGNOSIS — R50.9 FEVER, UNSPECIFIED: ICD-10-CM

## 2024-03-17 DIAGNOSIS — I10 ESSENTIAL (PRIMARY) HYPERTENSION: ICD-10-CM

## 2024-03-17 DIAGNOSIS — T83.091A OTHER MECHANICAL COMPLICATION OF INDWELLING URETHRAL CATHETER, INITIAL ENCOUNTER: ICD-10-CM

## 2024-03-17 DIAGNOSIS — E78.5 HYPERLIPIDEMIA, UNSPECIFIED: ICD-10-CM

## 2024-03-17 LAB
ALBUMIN SERPL ELPH-MCNC: 3.7 G/DL — SIGNIFICANT CHANGE UP (ref 3.5–5.2)
ALP SERPL-CCNC: 713 U/L — HIGH (ref 30–115)
ALT FLD-CCNC: 27 U/L — SIGNIFICANT CHANGE UP (ref 0–41)
ANION GAP SERPL CALC-SCNC: 12 MMOL/L — SIGNIFICANT CHANGE UP (ref 7–14)
APPEARANCE UR: ABNORMAL
AST SERPL-CCNC: 19 U/L — SIGNIFICANT CHANGE UP (ref 0–41)
BACTERIA # UR AUTO: ABNORMAL /HPF
BASE EXCESS BLDV CALC-SCNC: -6.4 MMOL/L — LOW (ref -2–3)
BASOPHILS # BLD AUTO: 0.03 K/UL — SIGNIFICANT CHANGE UP (ref 0–0.2)
BASOPHILS NFR BLD AUTO: 0.2 % — SIGNIFICANT CHANGE UP (ref 0–1)
BILIRUB SERPL-MCNC: 0.5 MG/DL — SIGNIFICANT CHANGE UP (ref 0.2–1.2)
BILIRUB UR-MCNC: NEGATIVE — SIGNIFICANT CHANGE UP
BUN SERPL-MCNC: 34 MG/DL — HIGH (ref 10–20)
CA-I SERPL-SCNC: 1.11 MMOL/L — LOW (ref 1.15–1.33)
CALCIUM SERPL-MCNC: 8.1 MG/DL — LOW (ref 8.4–10.5)
CAST: 1 /LPF — SIGNIFICANT CHANGE UP (ref 0–4)
CHLORIDE SERPL-SCNC: 103 MMOL/L — SIGNIFICANT CHANGE UP (ref 98–110)
CO2 SERPL-SCNC: 19 MMOL/L — SIGNIFICANT CHANGE UP (ref 17–32)
COLOR SPEC: YELLOW — SIGNIFICANT CHANGE UP
CREAT SERPL-MCNC: 1.8 MG/DL — HIGH (ref 0.7–1.5)
DIFF PNL FLD: ABNORMAL
EGFR: 40 ML/MIN/1.73M2 — LOW
EOSINOPHIL # BLD AUTO: 0 K/UL — SIGNIFICANT CHANGE UP (ref 0–0.7)
EOSINOPHIL NFR BLD AUTO: 0 % — SIGNIFICANT CHANGE UP (ref 0–8)
GAS PNL BLDV: 129 MMOL/L — LOW (ref 136–145)
GAS PNL BLDV: SIGNIFICANT CHANGE UP
GLUCOSE SERPL-MCNC: 119 MG/DL — HIGH (ref 70–99)
GLUCOSE UR QL: NEGATIVE MG/DL — SIGNIFICANT CHANGE UP
HCO3 BLDV-SCNC: 18 MMOL/L — LOW (ref 22–29)
HCT VFR BLD CALC: 32.8 % — LOW (ref 42–52)
HCT VFR BLDA CALC: 43 % — SIGNIFICANT CHANGE UP (ref 39–51)
HGB BLD CALC-MCNC: 14.3 G/DL — SIGNIFICANT CHANGE UP (ref 12.6–17.4)
HGB BLD-MCNC: 11.1 G/DL — LOW (ref 14–18)
IMM GRANULOCYTES NFR BLD AUTO: 1.8 % — HIGH (ref 0.1–0.3)
KETONES UR-MCNC: NEGATIVE MG/DL — SIGNIFICANT CHANGE UP
LACTATE BLDV-MCNC: 0.7 MMOL/L — SIGNIFICANT CHANGE UP (ref 0.5–2)
LEUKOCYTE ESTERASE UR-ACNC: ABNORMAL
LYMPHOCYTES # BLD AUTO: 0.46 K/UL — LOW (ref 1.2–3.4)
LYMPHOCYTES # BLD AUTO: 3.6 % — LOW (ref 20.5–51.1)
MCHC RBC-ENTMCNC: 30.5 PG — SIGNIFICANT CHANGE UP (ref 27–31)
MCHC RBC-ENTMCNC: 33.8 G/DL — SIGNIFICANT CHANGE UP (ref 32–37)
MCV RBC AUTO: 90.1 FL — SIGNIFICANT CHANGE UP (ref 80–94)
MONOCYTES # BLD AUTO: 1.11 K/UL — HIGH (ref 0.1–0.6)
MONOCYTES NFR BLD AUTO: 8.7 % — SIGNIFICANT CHANGE UP (ref 1.7–9.3)
NEUTROPHILS # BLD AUTO: 10.88 K/UL — HIGH (ref 1.4–6.5)
NEUTROPHILS NFR BLD AUTO: 85.7 % — HIGH (ref 42.2–75.2)
NITRITE UR-MCNC: NEGATIVE — SIGNIFICANT CHANGE UP
NRBC # BLD: 0 /100 WBCS — SIGNIFICANT CHANGE UP (ref 0–0)
PCO2 BLDV: 33 MMHG — LOW (ref 42–55)
PH BLDV: 7.35 — SIGNIFICANT CHANGE UP (ref 7.32–7.43)
PH UR: 5.5 — SIGNIFICANT CHANGE UP (ref 5–8)
PLATELET # BLD AUTO: 221 K/UL — SIGNIFICANT CHANGE UP (ref 130–400)
PMV BLD: 9.9 FL — SIGNIFICANT CHANGE UP (ref 7.4–10.4)
PO2 BLDV: 40 MMHG — SIGNIFICANT CHANGE UP (ref 25–45)
POTASSIUM BLDV-SCNC: 3.5 MMOL/L — SIGNIFICANT CHANGE UP (ref 3.5–5.1)
POTASSIUM SERPL-MCNC: 3.8 MMOL/L — SIGNIFICANT CHANGE UP (ref 3.5–5)
POTASSIUM SERPL-SCNC: 3.8 MMOL/L — SIGNIFICANT CHANGE UP (ref 3.5–5)
PROT SERPL-MCNC: 6.5 G/DL — SIGNIFICANT CHANGE UP (ref 6–8)
PROT UR-MCNC: 30 MG/DL
RBC # BLD: 3.64 M/UL — LOW (ref 4.7–6.1)
RBC # FLD: 12.8 % — SIGNIFICANT CHANGE UP (ref 11.5–14.5)
RBC CASTS # UR COMP ASSIST: 654 /HPF — HIGH (ref 0–4)
SAO2 % BLDV: 70.7 % — SIGNIFICANT CHANGE UP (ref 67–88)
SODIUM SERPL-SCNC: 134 MMOL/L — LOW (ref 135–146)
SP GR SPEC: 1.02 — SIGNIFICANT CHANGE UP (ref 1–1.03)
SQUAMOUS # UR AUTO: 1 /HPF — SIGNIFICANT CHANGE UP (ref 0–5)
UROBILINOGEN FLD QL: 0.2 MG/DL — SIGNIFICANT CHANGE UP (ref 0.2–1)
WBC # BLD: 12.71 K/UL — HIGH (ref 4.8–10.8)
WBC # FLD AUTO: 12.71 K/UL — HIGH (ref 4.8–10.8)
WBC UR QL: 35 /HPF — HIGH (ref 0–5)

## 2024-03-17 PROCEDURE — 36415 COLL VENOUS BLD VENIPUNCTURE: CPT

## 2024-03-17 PROCEDURE — 99284 EMERGENCY DEPT VISIT MOD MDM: CPT

## 2024-03-17 PROCEDURE — 81001 URINALYSIS AUTO W/SCOPE: CPT

## 2024-03-17 PROCEDURE — 87046 STOOL CULTR AEROBIC BACT EA: CPT

## 2024-03-17 PROCEDURE — 82803 BLOOD GASES ANY COMBINATION: CPT

## 2024-03-17 PROCEDURE — 99283 EMERGENCY DEPT VISIT LOW MDM: CPT | Mod: 25

## 2024-03-17 PROCEDURE — 87040 BLOOD CULTURE FOR BACTERIA: CPT

## 2024-03-17 PROCEDURE — 87045 FECES CULTURE AEROBIC BACT: CPT

## 2024-03-17 PROCEDURE — 84295 ASSAY OF SERUM SODIUM: CPT

## 2024-03-17 PROCEDURE — 85014 HEMATOCRIT: CPT

## 2024-03-17 PROCEDURE — 83605 ASSAY OF LACTIC ACID: CPT

## 2024-03-17 PROCEDURE — 82330 ASSAY OF CALCIUM: CPT

## 2024-03-17 PROCEDURE — 51702 INSERT TEMP BLADDER CATH: CPT

## 2024-03-17 PROCEDURE — 87186 SC STD MICRODIL/AGAR DIL: CPT

## 2024-03-17 PROCEDURE — 84132 ASSAY OF SERUM POTASSIUM: CPT

## 2024-03-17 PROCEDURE — 85018 HEMOGLOBIN: CPT

## 2024-03-17 PROCEDURE — 80053 COMPREHEN METABOLIC PANEL: CPT

## 2024-03-17 PROCEDURE — 85025 COMPLETE CBC W/AUTO DIFF WBC: CPT

## 2024-03-17 PROCEDURE — 87077 CULTURE AEROBIC IDENTIFY: CPT

## 2024-03-17 PROCEDURE — 87086 URINE CULTURE/COLONY COUNT: CPT

## 2024-03-17 RX ORDER — SODIUM CHLORIDE 9 MG/ML
1000 INJECTION, SOLUTION INTRAVENOUS ONCE
Refills: 0 | Status: COMPLETED | OUTPATIENT
Start: 2024-03-17 | End: 2024-03-17

## 2024-03-17 RX ORDER — ACETAMINOPHEN 500 MG
650 TABLET ORAL ONCE
Refills: 0 | Status: COMPLETED | OUTPATIENT
Start: 2024-03-17 | End: 2024-03-17

## 2024-03-17 RX ADMIN — SODIUM CHLORIDE 1000 MILLILITER(S): 9 INJECTION, SOLUTION INTRAVENOUS at 04:30

## 2024-03-17 NOTE — ED PROVIDER NOTE - ATTENDING CONTRIBUTION TO CARE
73 y.o. Male, PMHx of BPH (on finasteride), HTN, HLD Recent diagnosis of prostate malignancy with Thrasher catheter placed for urinary retention, comes in to the ED for fever, 1 day h/o watery/foul-smelling diarrhea every 20 min and no drainage from the thrasher for about 24 hrs. No n/v, CP/SOB, abdominal pain. On exam, pt in NAD, AAOx3, head NC/AT, CN II-XII intact, PEERL, EOMi, neck (-) midline tenderness, lungs CTA B/L, CV S1S2 regular, abdomen soft/(+) suprapubic distension/NT/(+)BS,  (+) indwelling thrasher, ext (-) edema, motor 5/5x4, sensation intact. Bedside US (+) distended bladder. Will change thrasher, check stool cultures, labs and reevaluate.

## 2024-03-17 NOTE — ED PROVIDER NOTE - PHYSICAL EXAMINATION
CONSTITUTIONAL: NAD  SKIN: Warm dry  HEAD: NCAT  EYES: NL inspection  ENT: MMM  NECK: Supple; non tender.  CARD: RRR  RESP: CTAB  ABD: S/NT no R/G, +thrasher in place, bedside ultrasound with urinary retention, dark urine op in leg bag   EXT: no pedal edema  NEURO: Grossly unremarkable  PSYCH: Cooperative, appropriate.

## 2024-03-17 NOTE — ED ADULT NURSE NOTE - NSFALLUNIVINTERV_ED_ALL_ED
Bed/Stretcher in lowest position, wheels locked, appropriate side rails in place/Call bell, personal items and telephone in reach/Instruct patient to call for assistance before getting out of bed/chair/stretcher/Non-slip footwear applied when patient is off stretcher/[place] to call system/Physically safe environment - no spills, clutter or unnecessary equipment/Purposeful proactive rounding/Room/bathroom lighting operational, light cord in reach

## 2024-03-17 NOTE — ED PROVIDER NOTE - CLINICAL SUMMARY MEDICAL DECISION MAKING FREE TEXT BOX
73 y.o. Male, PMHx of BPH (on finasteride), HTN, HLD Recent diagnosis of prostate malignancy with Thrasher catheter placed for urinary retention, comes in to the ED for fever, 1 day h/o watery/foul-smelling diarrhea every 20 min and no drainage from the thrasher for about 24 hrs. No n/v, CP/SOB, abdominal pain. On exam, pt in NAD, AAOx3, head NC/AT, CN II-XII intact, PEERL, EOMi, neck (-) midline tenderness, lungs CTA B/L, CV S1S2 regular, abdomen soft/(+) suprapubic distension/NT/(+)BS,  (+) indwelling thrasher, ext (-) edema, motor 5/5x4, sensation intact. Bedside US (+) distended bladder. Thrasher changed with large amt of urine drained. Labs reviewed. Pt is comfortable now. Pt has urology follow up tomorrow. Will d/c. Strict return precautions provided.

## 2024-03-17 NOTE — ED PROVIDER NOTE - CARE PROVIDER_API CALL
Rao Dumont  Internal Medicine  [address] [place] [id number]  Phone: [phone]  Fax: [phone]  Follow Up Time: 1-3 Days

## 2024-03-17 NOTE — ED PROVIDER NOTE - NSFOLLOWUPINSTRUCTIONS_ED_ALL_ED_FT
Acute Diarrhea    WHAT YOU NEED TO KNOW:    Acute diarrhea starts quickly and lasts a short time, usually 1 to 3 days. It can last up to 2 weeks. You may not be able to control your diarrhea. Acute diarrhea usually stops on its own.     DISCHARGE INSTRUCTIONS:    Return to the emergency department if:     You feel confused.       Your heartbeat is faster than usual.       Your eyes look deeply sunken, or you have no tears when you cry.       You urinate less than usual, or your urine is dark yellow.       You have blood or mucus in your bowel movements.      You have severe abdominal pain.       You are unable to drink any liquids.     Contact your healthcare provider if:     Your symptoms do not get better with treatment.       You have a fever higher than 101.3°F (38.5°C).       You have trouble eating and drinking because you are vomiting.       Your diarrhea does not get better in 7 days.       You have questions or concerns about your condition or care.     Follow up with your healthcare provider as directed: Write down your questions so you remember to ask them during your visits.     Medicines:    Diarrhea medicine is an over-the-counter medicine that helps slow or stop your diarrhea. Do not take this medicine unless your healthcare provider says it is okay.       Antibiotics may be given to help treat an infection caused by bacteria.       Antiparasitics may be given to treat an infection caused by parasites.       Take your medicine as directed. Contact your healthcare provider if you think your medicine is not helping or if you have side effects. Tell him of her if you are allergic to any medicine. Keep a list of the medicines, vitamins, and herbs you take. Include the amounts, and when and why you take them. Bring the list or the pill bottles to follow-up visits. Carry your medicine list with you in case of an emergency.    Self-care:     Drink liquids as directed. Liquids will help prevent dehydration caused by diarrhea. Ask your healthcare provider how much liquid to drink each day and which liquids are best for you. You may need to drink an oral rehydration solution (ORS). An ORS has the right amounts of water, salts, and sugar you need to replace body fluids. You can buy an ORS at most grocery stores and pharmacies.       Eat foods that are easy to digest. Examples include rice, lentils, cereal, bananas, potatoes, and bread. It also includes some fruits (bananas, melon), well-cooked vegetables, and lean meats. Do not eat foods high in fiber, fat, and sugar. Do not drink alcohol until your diarrhea is gone.     Prevent acute diarrhea:     Wash your hands often. Use soap and water. Wash your hands before you eat or prepare food. Also wash your hands after you use the bathroom. Use an alcohol-based hand gel when soap and water are not available. Handwashing           Keep bathroom surfaces clean. This helps prevent the spread of germs that cause acute diarrhea.       Wash fruits and vegetables well before you eat them. This can help remove germs that cause diarrhea. If possible, remove the skin from fruits and vegetables, or cook them well before you eat them.       Cook meat and poultry as directed. Meat includes beef and pork. Poultry includes chicken, turkey, and duck.  Cook ground meat to 160°F.       Cook ground poultry, whole poultry, or cuts of poultry to at least 165°F. Remove the poultry from heat. Let it stand for 3 minutes before you eat it.       Cook whole cuts of meat other than poultry to at least 145°F. Remove the meat from heat. Let it stand for 3 minutes before you eat it.       Wash dishes that have touched raw meat or poultry with hot water and soap. This includes cutting boards, utensils, dishes, and serving containers.       Place raw or cooked meat or poultry in the refrigerator as soon as possible. Bacteria can grow in meat or poultry that is left at room temperature too long.       Do not eat raw or undercooked oysters, clams, or mussels. These foods may be contaminated and cause infection.       Drink only filtered or treated water when you travel. Do not put ice in your drinks. Drink bottled water whenever possible.          © Copyright [employer] 2019 All illustrations and images included in CareNotes are the copyrighted property of [employer] Inc[employer] or [employer].

## 2024-03-17 NOTE — ED PROVIDER NOTE - OBJECTIVE STATEMENT
----- Message from Bel Oglesby MD sent at 3/25/0453 11:09 AM EDT -----  Blood work showed increase in sugars with an A1c going from 7 4 up to 8 2  I would increase his glimepiride up to 2 mg twice a day  I will send a new prescription to pharmacy    Recheck blood work in 4 months on next office visit
Pt aware of results and recommendations
72 yo f ho htn, hld, prostate ca with mets, bph presents with diarrhea x 2 days. Pt was recently admitted for hydronephrosis/mets and thrasher cath was placed, dc 4 days ago. Last night began having diarrhea with multiple foul smellig stools, fever tmax 102 and decreased urine op. Denies pain, nausea, vomiting, abd pain, cp, sob, difficulty breathing  Accompanied by niece and son

## 2024-03-17 NOTE — ED ADULT TRIAGE NOTE - CHIEF COMPLAINT QUOTE
Pt c/o diarrhea/fever. Was recently dx with CA with METS to the bone. S/p bone biopsy 1 day ago. Diarrhea x1 day, that progressively gotten worse. TMAX 102 @ home. Hensley decreased output Pt c/o diarrhea/fever. Was recently dx with CA with METS to the bone. S/p bone biopsy 1 day ago. Diarrhea x1 day that progressively gotten worse. TMAX 102 @ home. Hensley decreased output

## 2024-03-17 NOTE — ED ADULT NURSE NOTE - CHIEF COMPLAINT QUOTE
Pt c/o diarrhea/fever. Was recently dx with CA with METS to the bone. S/p bone biopsy 1 day ago. Diarrhea x1 day that progressively gotten worse. TMAX 102 @ home. Hensley decreased output

## 2024-03-17 NOTE — ED PROVIDER NOTE - PROGRESS NOTE DETAILS
ss S/p replaced thrasher, sent stool cultures, Patient to be discharged from ED. Any available test results were discussed with patient and/or family. Verbal instructions given, including instructions to return to ED immediately for any new, worsening, or concerning symptoms. Patient endorsed understanding. Written discharge instructions additionally given, including follow-up plan.

## 2024-03-17 NOTE — ED PROVIDER NOTE - PATIENT PORTAL LINK FT
You can access the FollowMyHealth Patient Portal offered by [facility] by registering at the following website: [email or web address]. By joining [facility]’s FollowMyHealth portal, you will also be able to view your health information using other applications (apps) compatible with our system.

## 2024-03-17 NOTE — ED PROVIDER NOTE - CARE PROVIDERS DIRECT ADDRESSES
[email or web address]

## 2024-03-18 ENCOUNTER — APPOINTMENT (OUTPATIENT)
Dept: UROLOGY | Facility: CLINIC | Age: 74
End: 2024-03-18
Payer: MEDICARE

## 2024-03-18 PROBLEM — I10 ESSENTIAL (PRIMARY) HYPERTENSION: Chronic | Status: ACTIVE | Noted: 2024-03-15

## 2024-03-18 PROCEDURE — 99213 OFFICE O/P EST LOW 20 MIN: CPT

## 2024-03-18 PROCEDURE — G2211 COMPLEX E/M VISIT ADD ON: CPT

## 2024-03-18 NOTE — PLAN
[TextEntry] : awaiting results from IR bone lesion biopsy I spoke with my partners and Dr Zhang agree to help with next steps -- possible TURBT for diagnosis I will email his  to help find a date for patient to meet Dr Zhang

## 2024-03-18 NOTE — HISTORY OF PRESENT ILLNESS
[FreeTextEntry1] : Dr Perez -- pt's urologist in [place]  Admitted to [facility] two weeks ago was in retention 1 liter March 7th -- thrasher placed During this stay, CT was performed which showed bladder mass possible prostate mass, as well as bone and pelvic LN possible metastases (primary urologist said a CT from July 2023 did not show signs of bladder mass or mets) PSA was drawn which was in the 9s. (PSA was 2.7 in July 2023 per his urologist)  On March 15th he underwent biopsy of bone lesion by IR -- results still pending

## 2024-03-19 DIAGNOSIS — R31.9 HEMATURIA, UNSPECIFIED: ICD-10-CM

## 2024-03-19 DIAGNOSIS — N13.39 OTHER HYDRONEPHROSIS: ICD-10-CM

## 2024-03-19 DIAGNOSIS — C61 MALIGNANT NEOPLASM OF PROSTATE: ICD-10-CM

## 2024-03-19 DIAGNOSIS — C79.11 SECONDARY MALIGNANT NEOPLASM OF BLADDER: ICD-10-CM

## 2024-03-19 DIAGNOSIS — N17.9 ACUTE KIDNEY FAILURE, UNSPECIFIED: ICD-10-CM

## 2024-03-19 DIAGNOSIS — C79.51 SECONDARY MALIGNANT NEOPLASM OF BONE: ICD-10-CM

## 2024-03-19 DIAGNOSIS — I10 ESSENTIAL (PRIMARY) HYPERTENSION: ICD-10-CM

## 2024-03-19 DIAGNOSIS — E78.5 HYPERLIPIDEMIA, UNSPECIFIED: ICD-10-CM

## 2024-03-19 DIAGNOSIS — K59.00 CONSTIPATION, UNSPECIFIED: ICD-10-CM

## 2024-03-19 DIAGNOSIS — R59.9 ENLARGED LYMPH NODES, UNSPECIFIED: ICD-10-CM

## 2024-03-19 LAB
CULTURE RESULTS: SIGNIFICANT CHANGE UP
SPECIMEN SOURCE: SIGNIFICANT CHANGE UP

## 2024-03-20 LAB
-  AMPICILLIN: SIGNIFICANT CHANGE UP
-  CIPROFLOXACIN: SIGNIFICANT CHANGE UP
-  LEVOFLOXACIN: SIGNIFICANT CHANGE UP
-  NITROFURANTOIN: SIGNIFICANT CHANGE UP
-  TETRACYCLINE: SIGNIFICANT CHANGE UP
-  VANCOMYCIN: SIGNIFICANT CHANGE UP
CULTURE RESULTS: ABNORMAL
METHOD TYPE: SIGNIFICANT CHANGE UP
ORGANISM # SPEC MICROSCOPIC CNT: ABNORMAL
ORGANISM # SPEC MICROSCOPIC CNT: SIGNIFICANT CHANGE UP
SPECIMEN SOURCE: SIGNIFICANT CHANGE UP

## 2024-03-21 ENCOUNTER — APPOINTMENT (OUTPATIENT)
Dept: HEMATOLOGY ONCOLOGY | Facility: CLINIC | Age: 74
End: 2024-03-21
Payer: MEDICARE

## 2024-03-21 ENCOUNTER — APPOINTMENT (OUTPATIENT)
Dept: UROLOGY | Facility: CLINIC | Age: 74
End: 2024-03-21
Payer: MEDICARE

## 2024-03-21 ENCOUNTER — OUTPATIENT (OUTPATIENT)
Dept: OUTPATIENT SERVICES | Facility: HOSPITAL | Age: 74
LOS: 1 days | End: 2024-03-21
Payer: MEDICARE

## 2024-03-21 VITALS
WEIGHT: 147.05 LBS | HEIGHT: 67 IN | OXYGEN SATURATION: 97 % | DIASTOLIC BLOOD PRESSURE: 60 MMHG | TEMPERATURE: 96 F | RESPIRATION RATE: 16 BRPM | SYSTOLIC BLOOD PRESSURE: 107 MMHG | HEART RATE: 88 BPM

## 2024-03-21 VITALS
SYSTOLIC BLOOD PRESSURE: 117 MMHG | RESPIRATION RATE: 16 BRPM | BODY MASS INDEX: 23.23 KG/M2 | HEART RATE: 82 BPM | HEIGHT: 67 IN | DIASTOLIC BLOOD PRESSURE: 72 MMHG | WEIGHT: 148 LBS | TEMPERATURE: 95.6 F

## 2024-03-21 DIAGNOSIS — C61 MALIGNANT NEOPLASM OF PROSTATE: ICD-10-CM

## 2024-03-21 DIAGNOSIS — N42.89 OTHER SPECIFIED DISORDERS OF PROSTATE: ICD-10-CM

## 2024-03-21 DIAGNOSIS — Z86.79 PERSONAL HISTORY OF OTHER DISEASES OF THE CIRCULATORY SYSTEM: ICD-10-CM

## 2024-03-21 DIAGNOSIS — Z90.49 ACQUIRED ABSENCE OF OTHER SPECIFIED PARTS OF DIGESTIVE TRACT: Chronic | ICD-10-CM

## 2024-03-21 DIAGNOSIS — N21.0 CALCULUS IN BLADDER: Chronic | ICD-10-CM

## 2024-03-21 DIAGNOSIS — C80.1 SECONDARY MALIGNANT NEOPLASM OF BONE: ICD-10-CM

## 2024-03-21 DIAGNOSIS — Z01.818 ENCOUNTER FOR OTHER PREPROCEDURAL EXAMINATION: ICD-10-CM

## 2024-03-21 DIAGNOSIS — N40.1 BENIGN PROSTATIC HYPERPLASIA WITH LOWER URINARY TRACT SYMPTOMS: ICD-10-CM

## 2024-03-21 DIAGNOSIS — N32.89 OTHER SPECIFIED DISORDERS OF BLADDER: ICD-10-CM

## 2024-03-21 DIAGNOSIS — C79.51 SECONDARY MALIGNANT NEOPLASM OF BONE: ICD-10-CM

## 2024-03-21 LAB
ALBUMIN SERPL ELPH-MCNC: 4 G/DL — SIGNIFICANT CHANGE UP (ref 3.5–5.2)
ALP SERPL-CCNC: 998 U/L — HIGH (ref 30–115)
ALT FLD-CCNC: 40 U/L — SIGNIFICANT CHANGE UP (ref 0–41)
ANION GAP SERPL CALC-SCNC: 13 MMOL/L — SIGNIFICANT CHANGE UP (ref 7–14)
AST SERPL-CCNC: 24 U/L — SIGNIFICANT CHANGE UP (ref 0–41)
BASOPHILS # BLD AUTO: 0.07 K/UL — SIGNIFICANT CHANGE UP (ref 0–0.2)
BASOPHILS NFR BLD AUTO: 0.7 % — SIGNIFICANT CHANGE UP (ref 0–1)
BILIRUB SERPL-MCNC: 0.3 MG/DL — SIGNIFICANT CHANGE UP (ref 0.2–1.2)
BUN SERPL-MCNC: 31 MG/DL — HIGH (ref 10–20)
CALCIUM SERPL-MCNC: 8.2 MG/DL — LOW (ref 8.4–10.5)
CHLORIDE SERPL-SCNC: 108 MMOL/L — SIGNIFICANT CHANGE UP (ref 98–110)
CO2 SERPL-SCNC: 19 MMOL/L — SIGNIFICANT CHANGE UP (ref 17–32)
CREAT SERPL-MCNC: 1.6 MG/DL — HIGH (ref 0.7–1.5)
EGFR: 45 ML/MIN/1.73M2 — LOW
EOSINOPHIL # BLD AUTO: 0.18 K/UL — SIGNIFICANT CHANGE UP (ref 0–0.7)
EOSINOPHIL NFR BLD AUTO: 1.9 % — SIGNIFICANT CHANGE UP (ref 0–8)
GLUCOSE SERPL-MCNC: 85 MG/DL — SIGNIFICANT CHANGE UP (ref 70–99)
HCT VFR BLD CALC: 34.3 % — LOW (ref 42–52)
HGB BLD-MCNC: 11.4 G/DL — LOW (ref 14–18)
IMM GRANULOCYTES NFR BLD AUTO: 4.2 % — HIGH (ref 0.1–0.3)
LYMPHOCYTES # BLD AUTO: 1.89 K/UL — SIGNIFICANT CHANGE UP (ref 1.2–3.4)
LYMPHOCYTES # BLD AUTO: 20 % — LOW (ref 20.5–51.1)
MCHC RBC-ENTMCNC: 29.7 PG — SIGNIFICANT CHANGE UP (ref 27–31)
MCHC RBC-ENTMCNC: 33.2 G/DL — SIGNIFICANT CHANGE UP (ref 32–37)
MCV RBC AUTO: 89.3 FL — SIGNIFICANT CHANGE UP (ref 80–94)
MONOCYTES # BLD AUTO: 0.66 K/UL — HIGH (ref 0.1–0.6)
MONOCYTES NFR BLD AUTO: 7 % — SIGNIFICANT CHANGE UP (ref 1.7–9.3)
NEUTROPHILS # BLD AUTO: 6.27 K/UL — SIGNIFICANT CHANGE UP (ref 1.4–6.5)
NEUTROPHILS NFR BLD AUTO: 66.2 % — SIGNIFICANT CHANGE UP (ref 42.2–75.2)
NON-GYNECOLOGICAL CYTOLOGY STUDY: SIGNIFICANT CHANGE UP
NRBC # BLD: 0 /100 WBCS — SIGNIFICANT CHANGE UP (ref 0–0)
PLATELET # BLD AUTO: 317 K/UL — SIGNIFICANT CHANGE UP (ref 130–400)
PMV BLD: 10.1 FL — SIGNIFICANT CHANGE UP (ref 7.4–10.4)
POTASSIUM SERPL-MCNC: 4.2 MMOL/L — SIGNIFICANT CHANGE UP (ref 3.5–5)
POTASSIUM SERPL-SCNC: 4.2 MMOL/L — SIGNIFICANT CHANGE UP (ref 3.5–5)
PROT SERPL-MCNC: 6.9 G/DL — SIGNIFICANT CHANGE UP (ref 6–8)
RBC # BLD: 3.84 M/UL — LOW (ref 4.7–6.1)
RBC # FLD: 13.2 % — SIGNIFICANT CHANGE UP (ref 11.5–14.5)
SODIUM SERPL-SCNC: 140 MMOL/L — SIGNIFICANT CHANGE UP (ref 135–146)
WBC # BLD: 9.47 K/UL — SIGNIFICANT CHANGE UP (ref 4.8–10.8)
WBC # FLD AUTO: 9.47 K/UL — SIGNIFICANT CHANGE UP (ref 4.8–10.8)

## 2024-03-21 PROCEDURE — 99215 OFFICE O/P EST HI 40 MIN: CPT

## 2024-03-21 PROCEDURE — 93010 ELECTROCARDIOGRAM REPORT: CPT

## 2024-03-21 PROCEDURE — 99214 OFFICE O/P EST MOD 30 MIN: CPT | Mod: 25

## 2024-03-21 PROCEDURE — 36415 COLL VENOUS BLD VENIPUNCTURE: CPT

## 2024-03-21 PROCEDURE — 93005 ELECTROCARDIOGRAM TRACING: CPT

## 2024-03-21 PROCEDURE — G2211 COMPLEX E/M VISIT ADD ON: CPT

## 2024-03-21 PROCEDURE — 85025 COMPLETE CBC W/AUTO DIFF WBC: CPT

## 2024-03-21 PROCEDURE — 80053 COMPREHEN METABOLIC PANEL: CPT

## 2024-03-21 RX ORDER — NITROFURANTOIN MACROCRYSTAL 50 MG
1 CAPSULE ORAL
Qty: 14 | Refills: 0
Start: 2024-03-21 | End: 2024-03-27

## 2024-03-21 NOTE — REVIEW OF SYSTEMS
[Fever] : no fever [Chills] : no chills [Night Sweats] : no night sweats [Fatigue] : no fatigue [Eye Pain] : no eye pain [Dysphagia] : no dysphagia [Odynophagia] : no odynophagia [Chest Pain] : no chest pain [Palpitations] : no palpitations [Shortness Of Breath] : no shortness of breath [Abdominal Pain] : no abdominal pain [Constipation] : constipation [Diarrhea: Grade 0] : Diarrhea: Grade 0 [Dysuria] : no dysuria [Incontinence] : no incontinence [Joint Pain] : no joint pain [Skin Rash] : no skin rash [Confused] : no confusion [Suicidal] : not suicidal [Negative] : Allergic/Immunologic

## 2024-03-21 NOTE — PHYSICAL EXAM
[Normal Appearance] : normal appearance [Well Groomed] : well groomed [General Appearance - In No Acute Distress] : no acute distress [Edema] : no peripheral edema [Exaggerated Use Of Accessory Muscles For Inspiration] : no accessory muscle use [Respiration, Rhythm And Depth] : normal respiratory rhythm and effort [Abdomen Soft] : soft [Costovertebral Angle Tenderness] : no ~M costovertebral angle tenderness [Abdomen Tenderness] : non-tender [Prostate Size ___ gm] : prostate size [unfilled] gm [Normal Station and Gait] : the gait and station were normal for the patient's age [No Focal Deficits] : no focal deficits [] : no rash [Affect] : the affect was normal [Oriented To Time, Place, And Person] : oriented to person, place, and time [Mood] : the mood was normal [de-identified] : significant left base induration of prostate

## 2024-03-21 NOTE — ASSESSMENT
[FreeTextEntry1] : 72 YO man, non smoker, was evaluated inpatient for abdomen pain and found to have pelvic mass (see CT scan below), with lymphadenopathy and bone masses. 1. IR guided bx done 3/15/24:result is pending 2. TURP pending 3/29/24 managment depends on the diagnosis

## 2024-03-21 NOTE — ASSESSMENT
[FreeTextEntry1] : CHARLY SULLIVAN is a 73-year-old male with hx of HTN, HLD, BPH on finasteride since ~2014, presented to [facility] with 1 liter urinary retention and obstructing left bladder wall vs prostate mass causing left hydronephrosis, JIGNESH, bone and pelvic LN possible metastases sp IR bone biopsy referred by Dr Aragon. KELVIN very suspicious for prostate cancer.  Suspect prostate cancer given family history and rising PSA on finasteride, +KELVIN.  Also no risk factors for bladder cancer. Oncology is requesting more tissue and therefore he will undergo resection.  He also wishes to address the urination and therefore he elects undergo transurethral resection of prostate and transurethral resection of bladder tumor. Patient is aware there is a high risk of bleeding as this is a channel TURP as we are doing this for prostate cancer.  I am recommending percutaneous nephrostomy now. - start antibiotics 3 days before procedure leading to procedure cipro - plan for TURP/TURBT - bone biopsy path pending   Regarding transurethral resection of the prostate we discussed multiple other options however for cancer purposes I am recommending gold standard which is a TURP. we discussed the risks including bleeding, infection, damage to the urethra, bladder and ureteral orifices, erectile dysfunction, scar tissue, and leaking (either due to injuring the sphincter or lowering bladder outlet obstruction with concomitant DO). The patient is not planning on having any additional children and also understands that retrograde ejaculation is also a risk of this procedure. This can be permanent. All TURP procedures can cause short or long term dysuria and intermittent hematuria. Patient is aware that his irritative voiding symptoms may not resolve post TURP. The patient understands that some of these complications are reversible while some are not and may require additional procedures. He understands that the prostate grows throughout life and this procedure does not guarantee that he will not need a future procedure. Patient was explained the need for thrasher catheter post-operatively and he understood that he may not void after the catheter is removed, thus requiring indwelling or intermittent catheterization.

## 2024-03-21 NOTE — HISTORY OF PRESENT ILLNESS
[FreeTextEntry1] : CHARLY SULLIVAN is a 73-year-old male with hx of HTN, HLD, BPH on finasteride since ~2014, presented to [facility] with 1 liter urinary retention and obstructing left bladder wall vs prostate mass causing left hydronephrosis, JIGNESH, bone and pelvic LN possible metastases sp IR bone biopsy referred by Dr Aragon.  Pt presents today with his wife. He presented to [facility] with abdominal pain, was found to be in urinary retention and had catheter placed 03/08/2024. He has been taking finasteride for 10 years for elevated PSA, as per pt it reached up to 10, now stable on finasteride. Had no prior prostate biopsies. Denies flank pain, gross hematuria, dysuria or associated symptoms.  NM Bone Scan 03/11/2024 - images independently reviewed by me IMPRESSION: Extensive multifocal increased activity throughout the bones, compatible with diffuse osseous metastatic disease as described above.  RBUS 03/12/2024 - IMPRESSION: Mild left-sided hydronephrosis without change. Enlarged prostate. Hensley catheter within the urinary bladder with residual urine within. (Right kidney: 1.6 cm parenchymal cyst. Urinary bladder: Hensley catheter is seen within the urinary bladder. Urinary bladder has 300 cc of urine in it. The prostate is enlarged, measuring greater than 110 cc.)  CT AP w/ IV Cont 03/08/2024 - images independently reviewed by me -agree with findings there is large tumor in the vicinity of the bladder neck/prostate extending towards the left trigone IMPRESSION: Left lateral urinary bladder wall masslike thickening, contiguous with prostate, with extravesicular tumor infiltration. Mass at region of left UVJ, with resultant moderate left hydroureteronephrosis. Urinary bladder distended. Right mild hydronephrosis or fullness. BPH with median lobe hypertrophy. 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 Findings compatible with metastatic neoplasm; prostate cancer invading bladder favored over primary urinary bladder neoplasm given suspicious osteoblastic metastatic bone disease.. Correlation with PSA recommended.  Suspicious pelvic lymphadenopathy catalogued above. Mild colonic stool burden. No bowel obstruction. Relation of prostate gland and rectum limited by modality.  Labs 03/17/2024 Cr 1.8  GFR 40  Ca 8.1  K 3.8 UA - proteinuria, 654 RBCs, 35 WBCs, bacteriuria. Microhematuria.  UCx - >100,000 CFU/ml Enterococcus faecalis x2 Blood Cx - no growth    history: Denies previous kidney stones, recurrent UTIs. Prior urologist in  with hx of bladder stones sp cystoscopic removal  Family History: brother had PCa.  Social History: works for [employer], Non-smoker,  Dr. Aragon notes reviewed by me -  1. Dr Perez today -- pt's urologist in [place]  PSA -- was about 2.7 in July 2023 at that same time also had CT scan which had no signs of metastatic disease.  PSA was drawn which was in the 9s. (PSA was 2.7 in July 2023 per his urologist) On March 15th he underwent biopsy of bone lesion by IR -- results still pending

## 2024-03-21 NOTE — H&P PST ADULT - NSSUBSTANCEUSE_GEN_ALL_CORE_SD
[0 - No Distress] : Distress Level: 0 [90: Able to carry normal activity; minor signs or symptoms of disease.] : 90: Able to carry normal activity; minor signs or symptoms of disease.  [ECOG Performance Status: 1 - Restricted in physically strenuous activity but ambulatory and able to carry out work of a light or sedentary nature] : Performance Status: 1 - Restricted in physically strenuous activity but ambulatory and able to carry out work of a light or sedentary nature, e.g., light house work, office work [Date: ____________] : Patient's last distress assessment performed on [unfilled]. 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 He was sent for CT by his PCP, Dr. Charles Smith, due to anemia. CT showed in the upper central abdomen abutting the posterior antrum of the stomach and neck of the pancreas there is a 3 x 2 x 3 cm mass. There is an additional heterogeneous enhancing 2.7 x 2.3 x 2.5 lobulated mass in the ventral upper abdominal peritoneal cavity anterosuperiorly to the antrum of the stomach with internal and surrounding enhancing vessels. Endoscopy with biopsy of the gastric mass was c/w moderately differentiated adenocarcinoma. Staging PET showed hypermetabolic thickening of the distal stomach, consistent with primary gastric cancer. Hypermetabolic activity in the distal stomach, inseparable from the perigastric lymphadenopathy, consistent with metastatic kong disease. \par  \par  TNM stage: T3, N2, M0 \par  \par  \par  Patient completed 4 cycles of postoperative adjuvant FLOT for gastric adenocarcinoma s/p robotic assisted laparoscopic distal gastrectomy with Billroth 2 gastrojejunostomy reconstruction and placement of feeding jejunostomy tube on 11/9/20 with Dr Young. He is s/p 4 cycles neoadjuvant FLOT. \par  + Fatigue, intermittent and mild. + Peripheral neuropathy, improved with gabapentin. + Occasional emesis after "eating too much," denies nausea. + Constipation, improved. + Alopecia. + Nail changes. + Cold intolerance improving. + Decreased appetite with mild weight loss. PEG removed by Dr. Young. + Grade 1 H/F, xeroderma only, no pain. No myalgias. No weakness. No dysgeusia. No fevers, no cough, no SOB. \par  \par  Socx: H/o smoking \par   [FreeTextEntry1] : status post FLOT , resection; 2020 RT to left lung 2023 [de-identified] : 8/6/21: Interval PET on 8/3 showed 1. Unchanged nonspecific mild diffuse FDG avidity in the residual stomach, status post gastrectomy and gastrojejunostomy, possibly physiologic.  No evidence of FDG avid perigastric lymph nodes.  Resolution of diffuse marrow FDG avidity. Unchanged subcentimeter left upper lobe pulmonary nodules, too small to characterize.  Nonspecific new minimally FDG avid small bilateral hilar lymph nodes.  Pt has mild tingling of fingers and toes, much improved from before. Continues to take gabapentin. His appetite is good. His weight is stable. Denies HA. CP, SOB, abd pain, constipation, diarrhea, melena, hematuria, dysuria. 08/23/2023 He is feeling well. NO cough no hemoptysis, no hospitalization and no change in medication. He has not had his Medioport flushed. he has seen a urologist outside our system. He is planning to request records to be sent to our team  11/4/21: Pt feels well. Right 4th finger lock up, no pain, does not bother him. Mild numbness/tingling for fingers and toes, improving.  Taking gabapentin. Appetite is good. Denies HA. CP, SOB, abd pain, constipation, diarrhea, melena, hematuria, dysuria.   2/13/22: CT scan on 2/4/22 showed no evidence of recurrent dz. Pt feels well. His appetite is good and gained some weight. Endorses neuropathy of hands. His fingers sometimes lock up. Denies HA. CP, SOB, abd pain, constipation, diarrhea, melena, hematuria, dysuria.   8/15/22: pt is here for follow up for gastric cancer s/p FLOT and resection. CT scan 8/8/22 showed Increase in size of 9 mm left upper lobe pulmonary nodule raising suspicion for small primary lung neoplasm.  No evidence of recurrent or metastatic disease within the abdomen/pelvis. Discussed findings with pt.   12/9/22: pt is here for follow up for gastric cancer s/p FLOT and resection. Pt feels well. Denies HA. CP, SOB, abd pain, constipation, diarrhea, melena, hematuria, dysuria.   CT chest on 12/2/22 showed Irregular left upper lobe 1 cm nodular opacity unchanged compared to 8/8/2022 and appears to be new compared to 4/15/2021 PET/CT; this nodule is indeterminate, but one diagnostic consideration is for primary lung neoplasm. Couple of additional left lung nodules measuring up to 0.5 cm unchanged compared to 4/15/2021 PET/CT.  3/8/23:  Patient presents for follow up. He completed 4 cycles of postoperative adjuvant FLOT for gastric adenocarcinoma s/p robotic assisted laparoscopic distal gastrectomy with Billroth 2 gastrojejunostomy reconstruction and placement of feeding jejunostomy tube on 11/9/20 with Dr Young. He is s/p 4 cycles neoadjuvant FLOT.  + Occasional constipation. + Trigger finger x 2-3 digits. No fatigue. No decreased appetite. No dysphagia. No abdominal pain. No fevers or chills.  4/7/23: Patient presents for a followup (transfer of care form Dr. Corral). Biopsy of a lung lesion done given concerning imaging findings,  significant for adenocarcinoma.  Patient doing well, offers no complaints  5/2/23: Returns for follow up visit. Feels well overall.  Reports has mediport in place. Denies acute pain, abdominal discomfort.   5/24/23: Returns for follow up visit. Transferring care from Dr. Billings. Pending starting RT on 5/26/23  Denies N/V, acute pain, cough, SOB, fever, chills.   On ASA, po iron, Ca+, Vit D tabs   never used

## 2024-03-21 NOTE — H&P PST ADULT - NSICDXPASTMEDICALHX_GEN_ALL_CORE_FT
PAST MEDICAL HISTORY:  Bladder stones     BPH (benign prostatic hyperplasia)     Hypertension     Prostate cancer

## 2024-03-21 NOTE — H&P PST ADULT - HISTORY OF PRESENT ILLNESS
Patient is a 73 year old  male presenting to [facility] in preparation for CYSTOSCOPY TRANSURETHRAL RESECTION OF PROSTATE, TRANSURETHRAL RESECTION OF BLADDER TUMOR   on   3/29 under general anesthesia by Dr. Zhang .     pt w/ recent admission for lower abdominal pain and constipation. The  patient follows with Urologist Dr. Perez and private Oncologist in [place].  The patient mentioned that he is not aware of the prostate cancer diagnosis and  was being managed for BPH by urology. He mentioned that he had a bladder stone  removal in 2021 and had colonoscopy recently in Feb which showed 5 polyps. The  patient has increase in urinary frequency and nocturia but denies any dysuria  and attributes it to Finasteride. He has FHx ( 2 brothers) of prostate cancer.    Pt admitted for urinary retention (1 liter March 7th -- thrasher placed)  During this stay, CT was performed which showed bladder mass possible prostate mass, as well as bone and pelvic LN possible metastases (primary urologist said a CT from July 2023 did not show signs of bladder mass or mets)  PSA was drawn which was in the 9s. (PSA was 2.7 in July 2023 per his urologist)   pt also had bone Bx in IR 3/15/24. pending results. Saw Onc Dr. Cheung today for eval& consult  ?    PATIENT CURRENTLY DENIES CHEST PAIN  SHORTNESS OF BREATH  PALPITATIONS,  DYSURIA, OR UPPER RESPIRATORY INFECTION IN PAST 2 WEEKS    denies travel outside the [place] in the past 30 days  Patient denies any signs or symptoms of COVID 19    Anesthesia Alert  NO--Difficult Airway  NO--History of neck surgery or radiation  NO--Limited ROM of neck  NO--History of Malignant hyperthermia  NO--No personal or family history of Pseudocholinesterase deficiency.  NO--Prior Anesthesia Complication  NO--Latex Allergy  NO--Loose teeth  NO--History of Rheumatoid Arthritis  NO--Bleeding risk  NO--TAYO  NO--Other_____    PT DENIES ANY RASHES, ABRASION, OR OPEN WOUNDS OR CUTS    AS PER THE PT, THIS IS HIS/HER COMPLETE MEDICAL AND SURGICAL HX, INCLUDING MEDICATIONS PRESCRIBED AND OVER THE COUNTER    Patient verbalized understanding of instructions and was given the opportunity to ask questions and have them answered.    pt denies any suicidal ideation or thoughts, pt states not a threat to self or others    Revised Cardiac Risk Index for Pre-Operative Risk from [email or web address]  on 3/21/2024  ** All calculations should be rechecked by clinician prior to use **    RESULT SUMMARY:  0 points  Class I Risk    3.9 %  30-day risk of death, MI, or cardiac arrest    From Ducgema 2017. These numbers are higher than those from the original study (Harvinder 1999). See Evidence for details.    INPUTS:  Elevated-risk surgery —> 0 = No  History of ischemic heart disease —> 0 = No  History of congestive heart failure —> 0 = No  History of cerebrovascular disease —> 0 = No  Pre-operative treatment with insulin —> 0 = No  Pre-operative creatinine >2 mg/dL / 176.8 µmol/L —> 0 = No    Duke Activity Status Index (DASI) from [employer]  on 3/21/2024  ** All calculations should be rechecked by clinician prior to use **    RESULT SUMMARY: D/T presence of Thrasher  39.45 points  The higher the score (maximum 58.2), the higher the functional status.    7.59 METs      INPUTS:  Take care of self —> 2.75 = Yes  Walk indoors —> 1.75 = Yes  Walk 1&ndash;2 blocks on level ground —> 2.75 = Yes  Climb a flight of stairs or walk up a hill —> 5.5 = Yes  Run a short distance —> 8 = Yes  Do light work around the house —> 2.7 = Yes  Do moderate work around the house —> 3.5 = Yes  Do heavy work around the house —> 8 = Yes  Do yardwork —> 4.5 = Yes  Have sexual relations —> 0 = No  Participate in moderate recreational activities —> 0 = No  Participate in strenuous sports —> 0 = No

## 2024-03-21 NOTE — H&P PST ADULT - NSICDXFAMILYHX_GEN_ALL_CORE_FT
FAMILY HISTORY:  Father  Still living? Yes, Estimated age: Age Unknown  FH: pancreatic cancer, Age at diagnosis: Age Unknown  FH: prostate cancer, Age at diagnosis: Age Unknown    Mother  Still living? No  FH: dementia, Age at diagnosis: Age Unknown    Sibling  Still living? Yes, Estimated age: Age Unknown  FH: prostate cancer, Age at diagnosis: Age Unknown

## 2024-03-21 NOTE — ADDENDUM
[FreeTextEntry1] : Patient's note was transcribed with the assistance of a medical scribe under the supervision of Dr. Zhang. I, Dr. Zhang, have reviewed the patient's chart and agree that it aligns with my medical decisions. Irina Lee, our scribe, also served as a chaperone for physical examination purposes.

## 2024-03-21 NOTE — HISTORY OF PRESENT ILLNESS
[de-identified] : 74yo Male with pmh of BPH (on finasteride), HTN, HLD presented to the ER with lower abdominal pain and constipation for the past few days. The patient follows with Urologist Dr. Perez and private Oncologist in [place]. The patient mentioned that he is not aware of the prostate cancer diagnosis and was being managed for BPH by urology. He mentioned that he had a bladder stone removal in 2021 and had colonoscopy recently in Feb which showed 5 polyps. The patient has increase in urinary frequency and nocturia but denies any dysuria and attributes it to Finasteride. He has FHx ( 2 brothers) of prostate cancer. The patient mentioned that he would like to see his private oncologist rather than get treatment with oncology team here. The patient has been making urine and Hensley was in ED as requested by urology and has mild hematuria (likely due to traumatic Hensley).  CT A/P 3/8/24: KIDNEYS/PELVIC ORGANS: Left lateral urinary bladder wall masslike thickening, contiguous with prostate, with extravesicular tumor infiltration. Mass at region of left UVJ, with resultant moderate left hydroureteronephrosis. Urinary bladder distended. BPH with median lobe hypertrophy. Three urinary bladder intraluminal calculi, measuring up to 0.5 cm. Relation of prostate gland and rectum limited by modality. Right mild hydronephrosis or fullness. ABDOMINOPELVIC NODES: Suspicious pelvic lymphadenopathy, including left medial external iliac chain 2 x 1.6 cm lymph node, right internal iliac chain 1.9 x 1.2 cm lymph node, multiple presacral lymph nodes measuring up to 0.9 cm short axis, and perirectal lymph nodes measuring up to 1.5 x 1 cm.  PERITONEUM/MESENTERY/BOWEL: Post appendectomy. No bowel obstruction. No ascites. Colonic diverticulosis. Mild colonic stool burden..  BONES/SOFT TISSUES: Patchy faint sclerosis throughout visualized spine, bony pelvis and bilateral ribs, suspicious for osteoblastic metastatic bone disease.  OTHER: Vascular calcifications.   IMPRESSION:  1. Left lateral urinary bladder wall masslike thickening, contiguous with prostate, with extravesicular tumor infiltration. Mass at region of left UVJ, with resultant moderate left hydroureteronephrosis. Urinary bladder distended. Right mild hydronephrosis or fullness. BPH with median lobe hypertrophy. 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 Findings compatible with metastatic neoplasm; prostate cancer invading bladder favored over primary urinary bladder neoplasm given suspicious osteoblastic metastatic bone disease.. Correlation with PSA recommended.  2. Suspicious pelvic lymphadenopathy, catalogued above.  3. Mild colonic stool burden. No bowel obstruction. Relation of prostate gland and rectum limited by modality.     BONE SCAN 3/11/24 Extensive multifocal increased activity throughout the bones, compatible with diffuse osseous metastatic disease. There is multifocal increased activity in the bilateral ribs, right humeral head, left mid humerus, pelvis, bilateral femurs, thoracolumbar spine, and calvarium.

## 2024-03-22 DIAGNOSIS — Z01.818 ENCOUNTER FOR OTHER PREPROCEDURAL EXAMINATION: ICD-10-CM

## 2024-03-22 DIAGNOSIS — N40.1 BENIGN PROSTATIC HYPERPLASIA WITH LOWER URINARY TRACT SYMPTOMS: ICD-10-CM

## 2024-03-22 DIAGNOSIS — C61 MALIGNANT NEOPLASM OF PROSTATE: ICD-10-CM

## 2024-03-22 LAB
CULTURE RESULTS: SIGNIFICANT CHANGE UP
SPECIMEN SOURCE: SIGNIFICANT CHANGE UP

## 2024-03-24 ENCOUNTER — TRANSCRIPTION ENCOUNTER (OUTPATIENT)
Age: 74
End: 2024-03-24

## 2024-03-26 PROBLEM — N40.0 BENIGN PROSTATIC HYPERPLASIA WITHOUT LOWER URINARY TRACT SYMPTOMS: Chronic | Status: ACTIVE | Noted: 2024-03-21

## 2024-03-26 PROBLEM — N21.0 CALCULUS IN BLADDER: Chronic | Status: ACTIVE | Noted: 2024-03-21

## 2024-03-27 ENCOUNTER — OUTPATIENT (OUTPATIENT)
Dept: OUTPATIENT SERVICES | Facility: HOSPITAL | Age: 74
LOS: 1 days | Discharge: HOME | End: 2024-03-27
Payer: MEDICARE

## 2024-03-27 ENCOUNTER — TRANSCRIPTION ENCOUNTER (OUTPATIENT)
Age: 74
End: 2024-03-27

## 2024-03-27 ENCOUNTER — RESULT REVIEW (OUTPATIENT)
Age: 74
End: 2024-03-27

## 2024-03-27 VITALS
DIASTOLIC BLOOD PRESSURE: 90 MMHG | HEART RATE: 100 BPM | SYSTOLIC BLOOD PRESSURE: 136 MMHG | RESPIRATION RATE: 16 BRPM | OXYGEN SATURATION: 100 %

## 2024-03-27 VITALS
OXYGEN SATURATION: 99 % | TEMPERATURE: 97 F | SYSTOLIC BLOOD PRESSURE: 126 MMHG | HEART RATE: 95 BPM | HEIGHT: 67 IN | WEIGHT: 145.51 LBS | DIASTOLIC BLOOD PRESSURE: 73 MMHG

## 2024-03-27 DIAGNOSIS — N21.0 CALCULUS IN BLADDER: Chronic | ICD-10-CM

## 2024-03-27 DIAGNOSIS — Z90.49 ACQUIRED ABSENCE OF OTHER SPECIFIED PARTS OF DIGESTIVE TRACT: Chronic | ICD-10-CM

## 2024-03-27 DIAGNOSIS — N32.89 OTHER SPECIFIED DISORDERS OF BLADDER: ICD-10-CM

## 2024-03-27 DIAGNOSIS — N13.9 OBSTRUCTIVE AND REFLUX UROPATHY, UNSPECIFIED: ICD-10-CM

## 2024-03-27 DIAGNOSIS — Z46.6 ENCOUNTER FOR FITTING AND ADJUSTMENT OF URINARY DEVICE: ICD-10-CM

## 2024-03-27 PROCEDURE — C1769: CPT

## 2024-03-27 PROCEDURE — 50432 PLMT NEPHROSTOMY CATHETER: CPT | Mod: LT

## 2024-03-27 PROCEDURE — C1729: CPT

## 2024-03-27 RX ORDER — CIPROFLOXACIN LACTATE 400MG/40ML
400 VIAL (ML) INTRAVENOUS ONCE
Refills: 0 | Status: COMPLETED | OUTPATIENT
Start: 2024-03-27 | End: 2024-03-27

## 2024-03-27 RX ADMIN — Medication 200 MILLIGRAM(S): at 14:03

## 2024-03-27 NOTE — PROGRESS NOTE ADULT - SUBJECTIVE AND OBJECTIVE BOX
INTERVENTIONAL RADIOLOGY BRIEF-OPERATIVE NOTE    Procedure:  Percutaneous left nephrostomy    Pre-Op Diagnosis:  Prostate and bladder masses    Post-Op Diagnosis:  Same    Antibiotic Prophylaxis:  Cipro, 400 mg, ivpb    Attending:  Giana (IR) / Giorgi Walden (Anaesthesia)  Resident:  None    Anesthesia (type):  [ ] General Anesthesia  [x] Sedation-- see anaesthesia record  [ ] Spinal Anesthesia  [X] Local/Regional-- 1% Lidocaine, SQ, 8 cc    Total Face-to-Face Sedation Time:  32 minutes    Contrast:  Visipaque, 20 cc to left renal collecting system, drained    Blood Loss:  5 cc    Condition:   [ ] Critical  [ ] Serious  [ ] Fair   [X] Good    Findings/Follow up Plan of Care:  Distended lower mid-pole calyx accessed.  Left nephrostogram demonstrates obstruction of the distal ureter with left hydroureteronephrosis.  8-Guamanian nephrostomy catheter placed.  draining serosanguinous urine, externally to gravity.  patient tolerated well, without incident.    Specimens Removed:  None    Implants:  None    Complications:  None immediate.    Disposition:  Discharge home and f/u with Dr. Zhang      Please call Interventional Radiology [phone] with any questions, concerns, or issues.

## 2024-03-27 NOTE — PRE PROCEDURE NOTE - PRE PROCEDURE EVALUATION
73-year-old male with hx of HTN, HLD, BPH on finasteride since ~2014, urinary retention and obstructing left bladder wall vs prostate mass causing left hydronephrosis, JIGNESH, bone and pelvic LN possible metastases sp IR bone biopsy presents today for L PCN placement.    Plan for left percutaneous nephrostomy catheter placement with conscious sedation / anesthesia today 3/27

## 2024-03-27 NOTE — ASU DISCHARGE PLAN (ADULT/PEDIATRIC) - CALL YOUR DOCTOR IF YOU HAVE ANY OF THE FOLLOWING:
Bleeding that does not stop/Swelling that gets worse/Pain not relieved by Medications/Fever greater than (need to indicate Fahrenheit or Celsius)/Wound/Surgical Site with redness, or foul smelling discharge or pus/Numbness, tingling, color or temperature change to extremity/Nausea and vomiting that does not stop/Unable to urinate/Excessive diarrhea/Inability to tolerate liquids or foods/Increased irritability or sluggishness -----  Pediatric Hospitalist Discharge Attestation:    HPI: Patient seen and examined at bedside with mother present. Infant doing well, feeding, stooling, urinating normally. s/p circ yesterday with swelling noted yesterday--mother reports improved swelling today.  OB evaluated penis last evening with no recommended intervention.    Physical Exam:  VS reviewed and stable  General: Infant appears active, with normal color, normal  cry.  HEENT: Scalp is normal with open, soft, flat fontanelle, Sclera clear, no discharge, nares patent b/l, palate intact, lips and tongue normal. right ear tag.  Lungs: Normal spontaneous respirations with no retractions, clear to auscultation b/l.  Heart: Strong, regular heart beat with no murmur.  Abdomen: soft, non distended, normal bowel sounds, no masses palpated, umbilical stump drying, no surrounding erythema or oozing.  Skin: Intact, no rashes, no jaundice.  Extremities: Hip exam normal, no click/clunk. No clavicular crepitus.  Neuro: Good tone, no lethargy, normal cry.    Assessment/Plan:  Normal . Physical Exam within normal limits. Feeding ad phillip, wt loss within normal limits. TC bilirubin appropriate (12.4 at ~48 HOL with phototherapy threshold 16.7).    -Breast feed or formula on demand, at least every 2-3 hours.  -Vitamin D supplementation recommended if exclusively .  -Flu and COVID vaccines recommended for all eligible household contacts.  -Tdap vaccine recommended for all close adult contacts.  -To seek medical attention emergently if infant is febrile.  -To call pediatrician if any concerns after discharge.  -Discharge home, follow up with pediatrician in 2 days (appt scheduled).

## 2024-03-27 NOTE — ASU DISCHARGE PLAN (ADULT/PEDIATRIC) - NS MD DC FALL RISK RISK
For information on Fall & Injury Prevention, visit: [email or web address] OR  [email or web address] OR  https://www.cdc.gov/steadi/patient.html

## 2024-03-27 NOTE — PRE PROCEDURE NOTE - NS PANP OPT1 GEN_ALL_CORE
Plan: Recommend broad spectrum sunscreen with SPF 30 or greater daily on face and chest and more areas if outside. Reapply every 2 hours or 80 minutes if in water Detail Level: Zone I attest my time as PA/NP is greater than 50% of the total combined time spent on qualifying patient care activities by the PA/NP and attending.

## 2024-03-28 ENCOUNTER — NON-APPOINTMENT (OUTPATIENT)
Age: 74
End: 2024-03-28

## 2024-03-28 DIAGNOSIS — N32.89 OTHER SPECIFIED DISORDERS OF BLADDER: ICD-10-CM

## 2024-03-29 ENCOUNTER — TRANSCRIPTION ENCOUNTER (OUTPATIENT)
Age: 74
End: 2024-03-29

## 2024-03-29 ENCOUNTER — RESULT REVIEW (OUTPATIENT)
Age: 74
End: 2024-03-29

## 2024-03-29 ENCOUNTER — OUTPATIENT (OUTPATIENT)
Dept: OUTPATIENT SERVICES | Facility: HOSPITAL | Age: 74
LOS: 1 days | Discharge: HOME | End: 2024-03-29
Payer: MEDICARE

## 2024-03-29 ENCOUNTER — APPOINTMENT (OUTPATIENT)
Dept: UROLOGY | Facility: HOSPITAL | Age: 74
End: 2024-03-29

## 2024-03-29 VITALS
HEART RATE: 100 BPM | TEMPERATURE: 96 F | RESPIRATION RATE: 18 BRPM | DIASTOLIC BLOOD PRESSURE: 79 MMHG | SYSTOLIC BLOOD PRESSURE: 133 MMHG | OXYGEN SATURATION: 97 % | HEIGHT: 67 IN | WEIGHT: 147.05 LBS

## 2024-03-29 VITALS — HEART RATE: 93 BPM | DIASTOLIC BLOOD PRESSURE: 78 MMHG | SYSTOLIC BLOOD PRESSURE: 111 MMHG

## 2024-03-29 DIAGNOSIS — C61 MALIGNANT NEOPLASM OF PROSTATE: ICD-10-CM

## 2024-03-29 DIAGNOSIS — N40.1 BENIGN PROSTATIC HYPERPLASIA WITH LOWER URINARY TRACT SYMPTOMS: ICD-10-CM

## 2024-03-29 DIAGNOSIS — N21.0 CALCULUS IN BLADDER: Chronic | ICD-10-CM

## 2024-03-29 DIAGNOSIS — N32.89 OTHER SPECIFIED DISORDERS OF BLADDER: ICD-10-CM

## 2024-03-29 DIAGNOSIS — Z90.49 ACQUIRED ABSENCE OF OTHER SPECIFIED PARTS OF DIGESTIVE TRACT: Chronic | ICD-10-CM

## 2024-03-29 DIAGNOSIS — N40.2 NODULAR PROSTATE WITHOUT LOWER URINARY TRACT SYMPTOMS: ICD-10-CM

## 2024-03-29 DIAGNOSIS — N40.0 BENIGN PROSTATIC HYPERPLASIA WITHOUT LOWER URINARY TRACT SYMPTOMS: ICD-10-CM

## 2024-03-29 DIAGNOSIS — I10 ESSENTIAL (PRIMARY) HYPERTENSION: ICD-10-CM

## 2024-03-29 DIAGNOSIS — R33.9 RETENTION OF URINE, UNSPECIFIED: ICD-10-CM

## 2024-03-29 PROCEDURE — 88341 IMHCHEM/IMCYTCHM EA ADD ANTB: CPT

## 2024-03-29 PROCEDURE — 88305 TISSUE EXAM BY PATHOLOGIST: CPT

## 2024-03-29 PROCEDURE — 52601 PROSTATECTOMY (TURP): CPT

## 2024-03-29 PROCEDURE — 88305 TISSUE EXAM BY PATHOLOGIST: CPT | Mod: 26

## 2024-03-29 PROCEDURE — 88342 IMHCHEM/IMCYTCHM 1ST ANTB: CPT | Mod: 26

## 2024-03-29 PROCEDURE — 82365 CALCULUS SPECTROSCOPY: CPT

## 2024-03-29 PROCEDURE — 88342 IMHCHEM/IMCYTCHM 1ST ANTB: CPT

## 2024-03-29 PROCEDURE — 88341 IMHCHEM/IMCYTCHM EA ADD ANTB: CPT | Mod: 26

## 2024-03-29 PROCEDURE — 88344 IMHCHEM/IMCYTCHM EA MLT ANTB: CPT

## 2024-03-29 PROCEDURE — C9399: CPT

## 2024-03-29 PROCEDURE — 88313 SPECIAL STAINS GROUP 2: CPT | Mod: 26

## 2024-03-29 RX ORDER — CIPROFLOXACIN LACTATE 400MG/40ML
1 VIAL (ML) INTRAVENOUS
Refills: 0 | DISCHARGE

## 2024-03-29 RX ORDER — OLMESARTAN MEDOXOMIL 5 MG/1
1 TABLET, FILM COATED ORAL
Refills: 0 | DISCHARGE

## 2024-03-29 RX ORDER — ONDANSETRON 8 MG/1
4 TABLET, FILM COATED ORAL ONCE
Refills: 0 | Status: DISCONTINUED | OUTPATIENT
Start: 2024-03-29 | End: 2024-03-29

## 2024-03-29 RX ORDER — SODIUM CHLORIDE 9 MG/ML
1000 INJECTION, SOLUTION INTRAVENOUS
Refills: 0 | Status: DISCONTINUED | OUTPATIENT
Start: 2024-03-29 | End: 2024-03-29

## 2024-03-29 RX ORDER — ATORVASTATIN CALCIUM 80 MG/1
1 TABLET, FILM COATED ORAL
Refills: 0 | DISCHARGE

## 2024-03-29 RX ORDER — FINASTERIDE 5 MG/1
1 TABLET, FILM COATED ORAL
Refills: 0 | DISCHARGE

## 2024-03-29 RX ORDER — HYDROMORPHONE HYDROCHLORIDE 2 MG/ML
0.5 INJECTION INTRAMUSCULAR; INTRAVENOUS; SUBCUTANEOUS
Refills: 0 | Status: DISCONTINUED | OUTPATIENT
Start: 2024-03-29 | End: 2024-03-29

## 2024-03-29 NOTE — BRIEF OPERATIVE NOTE - OPERATION/FINDINGS
sp TURP, bladder stone removal. significant BPH with median lobe, right lateral lobe BPH noted completely resected. left lateral lobe partially resected and growing under left UO (bullous changes noted on bladder). prostatic urethra wide open at conclusion of procedure.    if unable to urinate, can consider re-resection left lateral lobe after starting ADT.  of note catheter hypospadias noted  left PCN in place draining clear urine.  Right UO ok

## 2024-03-29 NOTE — PRE-ANESTHESIA EVALUATION ADULT - BMI (KG/M2)
Diabetic Eye Exam Form    Date Requested: 23  Patient: Bill Livingston  Patient : 1959   Referring Provider: Josephine Joseph DO      DIABETIC Eye Exam Date _______________________________      Type of Exam MUST be documented for Diabetic Eye Exams. Please CHECK ONE.     Retinal Exam       Dilated Retinal Exam       OCT       Optomap-Iris Exam      Fundus Photography       Left Eye - Please check Retinopathy or No Retinopathy        Exam did show retinopathy    Exam did not show retinopathy       Right Eye - Please check Retinopathy or No Retinopathy       Exam did show retinopathy    Exam did not show retinopathy       Comments Within       Practice Providing Exam ______________________________________________    Exam Performed By (print name) _______________________________________      Provider Signature ___________________________________________________      These reports are needed for  compliance.  Please fax this completed form and a copy of the Diabetic Eye Exam report to our office located at [address] [place] [address] as soon as possible via Fax [phone] alma Colette: Phone [phone]  We thank you for your assistance in treating our mutual patient.      
23

## 2024-03-29 NOTE — ASU PATIENT PROFILE, ADULT - NS PRO PASSIVE SMOKE EXP
Chief Complaint   Patient presents with     Follow Up     follow up on labs      Pre-visit Screening:  Immunizations:  up to date  Colonoscopy:  is up to date  Mammogram: is up to date  Asthma Action Test/Plan:  ACT given today   PHQ9:  NA  GAD7:  NA  Questioned patient about current smoking habits Pt. has never smoked.  Ok to leave detailed message on voice mail for today's visit only yes, phone # [phone]      
No

## 2024-03-29 NOTE — ASU PATIENT PROFILE, ADULT - FALL HARM RISK - HARM RISK INTERVENTIONS

## 2024-03-29 NOTE — ASU DISCHARGE PLAN (ADULT/PEDIATRIC) - ASU DC SPECIAL INSTRUCTIONSFT
You have a catheter which will stay in until your next appointment with Dr Zhang. Please take antibiotics which were previously prescribed to your pharmacy. You may take tylenol for pain as needed.  Blood In urine or around the tip of the penis is expected. If the catheter stops draining or if you develop fevers or chills >100.4 not improving w tylenol you should visit emergency room and alert Dr Zhang.  Dr Zhang's office will call you for a follow up appointment.

## 2024-04-01 ENCOUNTER — APPOINTMENT (OUTPATIENT)
Dept: UROLOGY | Facility: CLINIC | Age: 74
End: 2024-04-01
Payer: MEDICARE

## 2024-04-01 VITALS
WEIGHT: 146 LBS | OXYGEN SATURATION: 97 % | HEART RATE: 89 BPM | BODY MASS INDEX: 22.91 KG/M2 | TEMPERATURE: 97.5 F | SYSTOLIC BLOOD PRESSURE: 121 MMHG | HEIGHT: 67 IN | DIASTOLIC BLOOD PRESSURE: 76 MMHG

## 2024-04-01 DIAGNOSIS — N40.1 BENIGN PROSTATIC HYPERPLASIA WITH LOWER URINARY TRACT SYMPMS: ICD-10-CM

## 2024-04-01 DIAGNOSIS — N13.8 BENIGN PROSTATIC HYPERPLASIA WITH LOWER URINARY TRACT SYMPMS: ICD-10-CM

## 2024-04-01 PROCEDURE — 99214 OFFICE O/P EST MOD 30 MIN: CPT | Mod: 24

## 2024-04-01 NOTE — ASSESSMENT
[FreeTextEntry1] : CHARLY SULLIVAN is a 73-year-old male with hx of HTN, HLD, BPH on finasteride since ~2014, presented [facility] with 1 liter urinary retention and obstructing left bladder wall vs prostate mass causing left hydronephrosis, JIGNESH, bone and pelvic LN possible metastases sp IR bone biopsy referred by Dr Aragon. KELVIN very suspicious for prostate cancer. sp TURP, bladder stone removal. significant BPH with median lobe, right lateral lobe BPH noted completely resected left lateral lobe partially resected and growing under left UO (bullous changes noted on bladder). prostatic urethra wide open at conclusion of procedure (03/29/2024), of note catheter hypospadias noted. Bone biopsy pathology confirms metastatic prostate cancer  Plan - Catheter removed and TOV today, f/u after for PVR. ER precautions given. - f/u with oncology  - start bicalutamide in anticipation for ADT with oncology as well as advanced prostate cancer treatments - finish antibiotics

## 2024-04-01 NOTE — HISTORY OF PRESENT ILLNESS
[FreeTextEntry1] : CHARLY SULLIVAN is a 73-year-old male with hx of HTN, HLD, BPH on finasteride since ~2014, presented to [facility] with 1 liter urinary retention and obstructing left bladder wall vs prostate mass causing left hydronephrosis, JIGNESH, bone and pelvic LN possible metastases sp IR bone biopsy referred by Dr Aragon. KELVIN very suspicious for prostate cancer. sp TURP, bladder stone removal. significant BPH with median lobe, right lateral lobe BPH noted completely resected left lateral lobe partially resected and growing under left UO (bullous changes noted on bladder). prostatic urethra wide open at conclusion of procedure (03/29/2024), of note catheter hypospadias noted  Doing well no issues since turp, no f/c. took abx  Pathology 3/15/2024 - LEFT ILIAC BONE LESION, CT GUIDED-BIOPSY AND IMPRINTS: - POSITIVE FOR MALIGNANT CELLS. - Metastatic carcinoma of the prostate (see comment).  previously NM Bone Scan 03/11/2024 - images independently reviewed by me IMPRESSION: Extensive multifocal increased activity throughout the bones, compatible with diffuse osseous metastatic disease as described above.  RBUS 03/12/2024 - IMPRESSION: Mild left-sided hydronephrosis without change. Enlarged prostate. Hensley catheter within the urinary bladder with residual urine within. (Right kidney: 1.6 cm parenchymal cyst. Urinary bladder: Hensley catheter is seen within the urinary bladder. Urinary bladder has 300 cc of urine in it. The prostate is enlarged, measuring greater than 110 cc.)  CT AP w/ IV Cont 03/08/2024 - images independently reviewed by me -agree with findings there is large tumor in the vicinity of the bladder neck/prostate extending towards the left trigone IMPRESSION: Left lateral urinary bladder wall masslike thickening, contiguous with prostate, with extravesicular tumor infiltration. Mass at region of left UVJ, with resultant moderate left hydroureteronephrosis. Urinary bladder distended. Right mild hydronephrosis or fullness. BPH with median lobe hypertrophy. 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 Findings compatible with metastatic neoplasm; prostate cancer invading bladder favored over primary urinary bladder neoplasm given suspicious osteoblastic metastatic bone disease.. Correlation with PSA recommended.  Suspicious pelvic lymphadenopathy catalogued above. Mild colonic stool burden. No bowel obstruction. Relation of prostate gland and rectum limited by modality.  Labs 03/17/2024 Cr 1.8  GFR 40  Ca 8.1  K 3.8 UA - proteinuria, 654 RBCs, 35 WBCs, bacteriuria. Microhematuria.  UCx - >100,000 CFU/ml Enterococcus faecalis x2 Blood Cx - no growth    history: Denies previous kidney stones, recurrent UTIs. Prior urologist in  with hx of bladder stones sp cystoscopic removal  Family History: brother had PCa.  Social History: works for [employer], Non-smoker,  Dr. Aragon notes reviewed by me -  1. Dr Perez today -- pt's urologist in [place]  PSA -- was about 2.7 in July 2023 at that same time also had CT scan which had no signs of metastatic disease.  PSA was drawn which was in the 9s. (PSA was 2.7 in July 2023 per his urologist) On March 15th he underwent biopsy of bone lesion by IR -- results still pending

## 2024-04-05 NOTE — ED ADULT NURSE NOTE - NS ED NURSE RECORD ANOTHER HT AND WT
[facility]  Hematology/Oncology Clinic  Follow-up Visit Note     CC:  Thrombocytopenia    HISTORY OF PRESENT ILLNESS:  Michael Thompson is a 67 year old male with a diagnosis of thrombocytopenia.  We are consulted for assistance in management by Dr. Raya.      Patient is a pleasant 67-year-old man presenting today for follow-up evaluation in context of thrombocytopenia.  Review of CBCs demonstrates recent evidence of thrombocytopenia, with platelet count recently 116K as of April 2023.  He is otherwise manifests no evidence of leukocytopenia, anemia, nor microcytosis.  He has additionally undergone CT imaging, the results of which demonstrated evidence of splenomegaly.    Symptomatically, he reports he is feeling well.  He specifically denies ongoing symptoms of significant fevers, chills, or night sweats.  He additionally denies any appetite change, weight change, nor abdominal discomfort.  He has not experienced any bleeding symptoms, specifically denies purpura, petechiae, ecchymosis, epistaxis, melena, hematochezia, hematuria.      At time of initial evaluation with us, we had obtained platelet autoantibody testing, results of which returned positive.  Patient is otherwise generally feeling fairly well, denies any new symptoms, save for ongoing cough lingering after recent respiratory infection.  He was gradually recovering from this.  He has questions regarding anticipated further clinical course, and recommendations for follow-up.        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  Performed by a plastic surgeon    Calculus of kidney 12/22/2009    Followed in the past by Dr. Devon Randhawa. No recent recurrences.    Chondromalacia 11/2/2010    History of pneumonia 2006    Hyperlipidemia 2006    On fenofibrate for elevated triglycerides, as high as 667. Also on atorvastatin.    Left varicocele     He has been seen by an outside urologist. Currently asymptomatic.    Personal history of colonic polyps 11/2010    Hyperplastic polyp removed 7 year followup recommended    Varicose veins of legs     Most pronounced on distal right lower extremity, asymptomatic.     Past Surgical History:   Procedure Laterality Date    Colonoscopy w biopsy  11/18/10    Dr. Rangel, Polyps, F/U 7 years    Colonoscopy w biopsy  04/11/2018    Dr. Rangel, Hyperplastic polyp x1, f/u 10 yrs    Leg/ankle surgery proc unlisted  2000    left ankle fracture/repair/ Unspecified Leg/Ankle Procedure    Past surgical history  1997    removal of a piece of wood from left hand    Renal endoscopy,remv fb/stone      Repair ing hernia,6+y/o,reducibl  1986    Hernia, inguinal    Repair umbilical salena,<6y/o,reduc  1963    hernia, inquinal    Skin lesion excision Left     Actinic keratosis excised by Dr. Vaz     Family History   Problem Relation Age of Onset    Heart Mother         valve replacement    Hyperlipidemia Mother     Heart Father         CAD    Diabetes Father     Genitourinary Father         kidney stones     ALLERGIES:   Allergen Reactions    Betadine Prepstick [Povidone Iodine]      Shellfish \"ok  Rash  from prolonged exposure.         Social History     Social History Narrative     but got remarried in summer 2018. He is an  at [employer].  He does not smoke. He drinks alcohol socially. He has taken up bicycling. Had lost 30 pounds but then regained at least 10 pounds, which he attributes to not exercising as much and eating out more.       Current Outpatient Medications   Medication Sig  Dispense Refill    atorvastatin (LIPITOR) 10 MG tablet Take 1 tablet by mouth daily. (Patient not taking: Reported on 4/5/2024) 90 tablet 3    fenofibrate (TRICOR) 145 MG tablet Take 1 tablet by mouth daily. (Patient not taking: Reported on 4/5/2024) 90 tablet 3    aspirin 81 MG tablet Take 1 tablet by mouth daily. (Patient not taking: Reported on 4/5/2024) 30 tablet 0     No current facility-administered medications for this visit.       REVIEW OF SYSTEMS:  Reviewed from nurse’s notes and concur.      PHYSICAL EXAMINATION:   Oncology Encounter Vitals [04/05/24 0831]   ONC OP Encounter Vitals Group      /80      Heart Rate 62      Resp 16      Temp 98.3 °F (36.8 °C)      Temp src       SpO2 95 %      Weight 260 lb 0.5 oz (117.9 kg)      Height       Pain Score  0      Pain Location       Pain Education?       BSA (Calculated - m2) - Jordana & Jordana       BSA (Calculated - sq m)       BMI (Calculated)      General Appearance - Alert, well appearing, and in no distress.  Mental Status - Alert, oriented to person, place, and time.   Eyes - Pupils equal and reactive, extraocular eye movements intact.   Remainder of exam deferred as this is primarily a counseling visit.    Labs reviewed and discussed with patient:  WBC (K/mcL)   Date Value   04/05/2024 5.0     RBC (mil/mcL)   Date Value   04/05/2024 5.21     HCT (%)   Date Value   04/05/2024 45.1     HGB (g/dL)   Date Value   04/05/2024 15.4     PLT (K/mcL)   Date Value   04/05/2024 122 (L)     SODIUM (MMOL/L)   Date Value   08/09/2007 142     Potassium (MMOL/L)   Date Value   08/09/2007 3.8     CHLORIDE (MMOL/L)   Date Value   08/09/2007 106     Glucose (mg/dL)   Date Value   04/24/2018 102.0 (H)     No results found for: \"CALCIUM\"  CO2 (MMOL/L)   Date Value   08/09/2007 25     No results found for: \"BUN\"  Creatinine (mg/dL)   Date Value   04/24/2018 1.20       No results found for: \"AST\"  ALT/SGPT (u/L)   Date Value   04/24/2018 14.0       Imaging results  reviewed and discussed with patient, as discussed above as per HPI, and I have reviewed imaging directly from CT scan performed 8/25/23 with patient, results from which demonstrate evidence of mild splenomegaly without evidence for focal mass lesion or lymphadenopathy    ASSESSMENT AND PLAN:  In summary, Michael Thompson is a 67 year old male with a recent diagnosis of thrombocytopenia, presenting today for follow-up hematologic evaluation.  I discussed with patient findings from recent CBC, demonstrating evidence of mild-moderate thrombocytopenia, without evidence for anemia, leukocytopenia, and additionally not associated with any bleeding symptoms.  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  Typically, bone marrow biopsy would be required to differentiate this from other processes including a lymphoproliferative disorder or possible myeloproliferative neoplasm.  I discussed with him that despite splenic enlargement observed on recent imaging, I think these diagnoses are unlikely, acknowledging absence of other cytopenias nor symptoms suggesting a bone marrow infiltrative process, nor hematopoietic malignancy.    We will therefore proceed first with laboratory testing today including platelet autoantibody evaluation, metabolic, nutritional, and infectious screening in context of his thrombocytopenia.  I will plan to continue to peripherally follow in his care, with labs performed every 3-6 months for now, with plan to proceed with additional diagnostic evaluation to include bone marrow biopsy in the event of significant progression of his thrombocytopenia.      Regarding  respiratory symptoms, I have recommended that we complete chest x-ray to exclude underlying respiratory infectious process, although my suspicion for this remains low.      Patient expressed understanding and agreement with the above plan, all questions answered to his satisfaction.      Rudy Mena MD    Yes

## 2024-04-07 NOTE — ASU PREOP CHECKLIST - WEIGHT IN LBS
Labs:  CAPILLARY BLOOD GLUCOSE                              12.5   9.43  )-----------( 294      ( 07 Apr 2024 07:20 )             38.2       Auto Neutrophil %: 79.7 % (04-07-24 @ 07:20)  Auto Immature Granulocyte %: 0.3 % (04-07-24 @ 07:20)    04-07    138  |  101  |  20  ----------------------------<  144<H>  3.9   |  21  |  0.7      Calcium: 9.9 mg/dL (04-07-24 @ 07:20)      LFTs:             7.6  | <0.2 | 12       ------------------[95      ( 07 Apr 2024 07:20 )  4.4  | x    | <5          Lipase:19     Amylase:x         Lactate, Blood: 3.6 mmol/L (04-07-24 @ 07:20)      Coags:        Urinalysis Basic - ( 07 Apr 2024 07:20 )    Color: x / Appearance: x / SG: x / pH: x  Gluc: 144 mg/dL / Ketone: x  / Bili: x / Urobili: x   Blood: x / Protein: x / Nitrite: x   Leuk Esterase: x / RBC: x / WBC x   Sq Epi: x / Non Sq Epi: x / Bacteria: x        < from: CT Abdomen and Pelvis w/ IV Cont (04.07.24 @ 09:36) >    IMPRESSION:    Cholelithiasis without evidence of acute cholecystitis.      < end of copied text > 147

## 2024-04-08 ENCOUNTER — APPOINTMENT (OUTPATIENT)
Age: 74
End: 2024-04-08
Payer: MEDICARE

## 2024-04-08 ENCOUNTER — LABORATORY RESULT (OUTPATIENT)
Age: 74
End: 2024-04-08

## 2024-04-08 ENCOUNTER — OUTPATIENT (OUTPATIENT)
Dept: OUTPATIENT SERVICES | Facility: HOSPITAL | Age: 74
LOS: 1 days | End: 2024-04-08
Payer: MEDICARE

## 2024-04-08 VITALS
WEIGHT: 148 LBS | OXYGEN SATURATION: 99 % | SYSTOLIC BLOOD PRESSURE: 132 MMHG | DIASTOLIC BLOOD PRESSURE: 81 MMHG | HEART RATE: 91 BPM | HEIGHT: 66 IN | BODY MASS INDEX: 23.78 KG/M2 | RESPIRATION RATE: 16 BRPM | TEMPERATURE: 98.1 F

## 2024-04-08 DIAGNOSIS — Z90.49 ACQUIRED ABSENCE OF OTHER SPECIFIED PARTS OF DIGESTIVE TRACT: Chronic | ICD-10-CM

## 2024-04-08 DIAGNOSIS — N13.30 UNSPECIFIED HYDRONEPHROSIS: ICD-10-CM

## 2024-04-08 DIAGNOSIS — N21.0 CALCULUS IN BLADDER: Chronic | ICD-10-CM

## 2024-04-08 DIAGNOSIS — C79.51 SECONDARY MALIGNANT NEOPLASM OF BONE: ICD-10-CM

## 2024-04-08 LAB
ALBUMIN SERPL ELPH-MCNC: 3.9 G/DL
ALP BLD-CCNC: 2353 U/L
ALT SERPL-CCNC: 24 U/L
ANION GAP SERPL CALC-SCNC: 13 MMOL/L
AST SERPL-CCNC: 21 U/L
BILIRUB DIRECT SERPL-MCNC: <0.2 MG/DL
BILIRUB INDIRECT SERPL-MCNC: >0.1 MG/DL
BILIRUB SERPL-MCNC: 0.3 MG/DL
BUN SERPL-MCNC: 30 MG/DL
CALCIUM SERPL-MCNC: 8.7 MG/DL
CHLORIDE SERPL-SCNC: 108 MMOL/L
CO2 SERPL-SCNC: 23 MMOL/L
CREAT SERPL-MCNC: 1.6 MG/DL
EGFR: 45 ML/MIN/1.73M2
GLUCOSE SERPL-MCNC: 104 MG/DL
HCT VFR BLD CALC: 30.8 %
HGB BLD-MCNC: 10.3 G/DL
IRON SATN MFR SERPL: 29 %
IRON SERPL-MCNC: 77 UG/DL
MCHC RBC-ENTMCNC: 29.8 PG
MCHC RBC-ENTMCNC: 33.4 G/DL
MCV RBC AUTO: 89 FL
PLATELET # BLD AUTO: 229 K/UL
PMV BLD: 9.2 FL
POTASSIUM SERPL-SCNC: 4.4 MMOL/L
PROT SERPL-MCNC: 6.3 G/DL
RBC # BLD: 3.46 M/UL
RBC # FLD: 13.7 %
SODIUM SERPL-SCNC: 144 MMOL/L
TIBC SERPL-MCNC: 268 UG/DL
UIBC SERPL-MCNC: 191 UG/DL
WBC # FLD AUTO: 10.35 K/UL

## 2024-04-08 PROCEDURE — 99215 OFFICE O/P EST HI 40 MIN: CPT

## 2024-04-08 PROCEDURE — 82728 ASSAY OF FERRITIN: CPT

## 2024-04-08 PROCEDURE — 83550 IRON BINDING TEST: CPT

## 2024-04-08 PROCEDURE — 80048 BASIC METABOLIC PNL TOTAL CA: CPT

## 2024-04-08 PROCEDURE — 80076 HEPATIC FUNCTION PANEL: CPT

## 2024-04-08 PROCEDURE — 83540 ASSAY OF IRON: CPT

## 2024-04-08 PROCEDURE — 36415 COLL VENOUS BLD VENIPUNCTURE: CPT

## 2024-04-08 PROCEDURE — 84153 ASSAY OF PSA TOTAL: CPT

## 2024-04-08 PROCEDURE — 85027 COMPLETE CBC AUTOMATED: CPT

## 2024-04-08 RX ORDER — ONDANSETRON 8 MG/1
8 TABLET ORAL
Qty: 21 | Refills: 0 | Status: COMPLETED | COMMUNITY
Start: 2024-04-08 | End: 2024-04-15

## 2024-04-08 RX ORDER — PROCHLORPERAZINE MALEATE 10 MG/1
10 TABLET ORAL EVERY 6 HOURS
Qty: 30 | Refills: 3 | Status: ACTIVE | COMMUNITY
Start: 2024-04-08 | End: 1900-01-01

## 2024-04-08 RX ORDER — PROCHLORPERAZINE MALEATE 10 MG/1
10 TABLET ORAL EVERY 6 HOURS
Qty: 120 | Refills: 2 | Status: ACTIVE | COMMUNITY
Start: 2024-04-08 | End: 1900-01-01

## 2024-04-08 RX ORDER — ONDANSETRON 8 MG/1
8 TABLET ORAL EVERY 8 HOURS
Qty: 90 | Refills: 1 | Status: ACTIVE | COMMUNITY
Start: 2024-04-08 | End: 1900-01-01

## 2024-04-08 NOTE — REVIEW OF SYSTEMS
[Constipation] : constipation [Diarrhea: Grade 0] : Diarrhea: Grade 0 [Negative] : Allergic/Immunologic [Fever] : no fever [Chills] : no chills [Night Sweats] : no night sweats [Fatigue] : no fatigue [Eye Pain] : no eye pain [Dysphagia] : no dysphagia [Odynophagia] : no odynophagia [Chest Pain] : no chest pain [Palpitations] : no palpitations [Shortness Of Breath] : no shortness of breath [Abdominal Pain] : no abdominal pain [Dysuria] : no dysuria [Incontinence] : no incontinence [Joint Pain] : no joint pain [Skin Rash] : no skin rash [Confused] : no confusion [Suicidal] : not suicidal [FreeTextEntry8] : hematuria from urethra, urostomy bag draining clear yellow

## 2024-04-08 NOTE — ASSESSMENT
[FreeTextEntry1] : 74 YO man, non smoker, was evaluated inpatient for abdomen pain and found to have pelvic mass (see CT scan below), with lymphadenopathy and bone masses.   # Metastatic Prostate Cancer, Stage IV, prognostic grade group 5, [place] score 10 (5+5), dx 03/2024  - s/p left iliac bone biopsy with IR on 3.15.2024 which was diagnostic for metastatic carcinoma of the prostate - s/p TURP 3/29/24:  - Perineural invasion is identified. - Rare focus suspicious for lymphovascular invasion. - reviewed radiology, pathology and lab workup and had a discussion regarding implications of diagnosis, prognosis and options for management including but not limited to chemotherapy + ADT  - c/w Casodex 50mg once daily, initiated 4.1.2024 , Rx renewed  - START Lupron 22.5mg every 12 weeks to be initiated 2-3 weeks after starting Casodex , orders written + possible side effects discussed  - Discussed risks of chemotherapy using Taxotere including but not limited to fatigue, nausea, vomiting, diarrhea, elevated liver enzymes, loss of appetite, mucositis, hair loss, allergic reactions, cytopenias, and infection to name a few.  Written information provided.  Anti-emetics prescribed to pharmacy on file.  Consent obtained.   - he will require pre-medication with Taxotere 8mg BiD starting day for 3 days starting 1 day prior to TAXOTERE administration in order to reduce the incidence and severity of fluid retention as well as the severity of hypersensitivity reactions, Rx sent + possible adverse effects discussed  - briefly discussed role and indications for biphosphonate therapy such as Zometa every 4 weeks to preserve bone strength and prevent fracture; risks vs benefits discussed.  He will require dental clearance prior to initiation, form provided to patient to be completed by Dentist and returned to clinic.  - followup with URO, Dr. Zhang, as scheduled  - Labwork today: CBC, BMP, LFTs, iron studies, ferritin, PSA level   RTC with C2 of chemotherapy with Dr. Cheung with CBC, BMP, LFTs prior   seen/ examined w/hemonc fellow; note reviewed; case discussed; agree w/plan explained diagnosis; explained the extent prostate adenocarcinoma, stage IV, metastatic to bones explaned the goal is not to cure , but palliative start ADT start chemo w/ taxotere dental clearance before starting zometa

## 2024-04-08 NOTE — CONSULT LETTER
[Dear  ___] : Dear  [unfilled], [Please see my note below.] : Please see my note below. [Sincerely,] : Sincerely, [FreeTextEntry3] : Dakotah Esparza NP

## 2024-04-08 NOTE — REASON FOR VISIT
[Follow-Up Visit] : a follow-up [Family Member] : family member [FreeTextEntry2] : metastatic neoplasm

## 2024-04-08 NOTE — HISTORY OF PRESENT ILLNESS
[de-identified] : 72yo Male with pmh of BPH (on finasteride), HTN, HLD presented to the ER with lower abdominal pain and constipation for the past few days. The patient follows with Urologist Dr. Perez and private Oncologist in [place]. The patient mentioned that he is not aware of the prostate cancer diagnosis and was being managed for BPH by urology. He mentioned that he had a bladder stone removal in 2021 and had colonoscopy recently in Feb which showed 5 polyps. The patient has increase in urinary frequency and nocturia but denies any dysuria and attributes it to Finasteride. He has FHx ( 2 brothers) of prostate cancer. The patient mentioned that he would like to see his private oncologist rather than get treatment with oncology team here. The patient has been making urine and Hensley was in ED as requested by urology and has mild hematuria (likely due to traumatic Hensley).  CT A/P 3/8/24: KIDNEYS/PELVIC ORGANS: Left lateral urinary bladder wall masslike thickening, contiguous with prostate, with extravesicular tumor infiltration. Mass at region of left UVJ, with resultant moderate left hydroureteronephrosis. Urinary bladder distended. BPH with median lobe hypertrophy. Three urinary bladder intraluminal calculi, measuring up to 0.5 cm. Relation of prostate gland and rectum limited by modality. Right mild hydronephrosis or fullness. ABDOMINOPELVIC NODES: Suspicious pelvic lymphadenopathy, including left medial external iliac chain 2 x 1.6 cm lymph node, right internal iliac chain 1.9 x 1.2 cm lymph node, multiple presacral lymph nodes measuring up to 0.9 cm short axis, and perirectal lymph nodes measuring up to 1.5 x 1 cm.  PERITONEUM/MESENTERY/BOWEL: Post appendectomy. No bowel obstruction. No ascites. Colonic diverticulosis. Mild colonic stool burden..  BONES/SOFT TISSUES: Patchy faint sclerosis throughout visualized spine, bony pelvis and bilateral ribs, suspicious for osteoblastic metastatic bone disease.  OTHER: Vascular calcifications.   IMPRESSION:  1. Left lateral urinary bladder wall masslike thickening, contiguous with prostate, with extravesicular tumor infiltration. Mass at region of left UVJ, with resultant moderate left hydroureteronephrosis. Urinary bladder distended. Right mild hydronephrosis or fullness. BPH with median lobe hypertrophy. 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 Findings compatible with metastatic neoplasm; prostate cancer invading bladder favored over primary urinary bladder neoplasm given suspicious osteoblastic metastatic bone disease.. Correlation with PSA recommended.  2. Suspicious pelvic lymphadenopathy, catalogued above.  3. Mild colonic stool burden. No bowel obstruction. Relation of prostate gland and rectum limited by modality.     BONE SCAN 3/11/24 Extensive multifocal increased activity throughout the bones, compatible with diffuse osseous metastatic disease. There is multifocal increased activity in the bilateral ribs, right humeral head, left mid humerus, pelvis, bilateral femurs, thoracolumbar spine, and calvarium.  [de-identified] : 4/8/24 Patient is here for a follow-up visit for metastatic prostate cancer, accompanied by son.  He is feeling well with no new complaints.  Reviewed pathology from recent left iliac bone biopsy which was diagnostic for metastatic carcinoma of the prostate.  He recently started Casodex 50mg once daily on 4.1.2024 as per recommendations from Urology.  Patient denies fever, chills, nausea, vomiting or dyspnea.  He reports hematuria.

## 2024-04-09 DIAGNOSIS — C79.51 SECONDARY MALIGNANT NEOPLASM OF BONE: ICD-10-CM

## 2024-04-09 LAB
FERRITIN SERPL-MCNC: 1005 NG/ML
PSA SERPL-MCNC: 6.16 NG/ML

## 2024-04-12 ENCOUNTER — NON-APPOINTMENT (OUTPATIENT)
Age: 74
End: 2024-04-12

## 2024-04-18 ENCOUNTER — APPOINTMENT (OUTPATIENT)
Age: 74
End: 2024-04-18

## 2024-04-18 ENCOUNTER — NON-APPOINTMENT (OUTPATIENT)
Age: 74
End: 2024-04-18

## 2024-04-18 ENCOUNTER — OUTPATIENT (OUTPATIENT)
Dept: OUTPATIENT SERVICES | Facility: HOSPITAL | Age: 74
LOS: 1 days | End: 2024-04-18
Payer: MEDICARE

## 2024-04-18 VITALS — DIASTOLIC BLOOD PRESSURE: 76 MMHG | HEART RATE: 97 BPM | SYSTOLIC BLOOD PRESSURE: 148 MMHG | TEMPERATURE: 97 F

## 2024-04-18 VITALS — BODY MASS INDEX: 25.33 KG/M2 | WEIGHT: 152 LBS | HEIGHT: 65 IN

## 2024-04-18 DIAGNOSIS — Z90.49 ACQUIRED ABSENCE OF OTHER SPECIFIED PARTS OF DIGESTIVE TRACT: Chronic | ICD-10-CM

## 2024-04-18 DIAGNOSIS — N21.0 CALCULUS IN BLADDER: Chronic | ICD-10-CM

## 2024-04-18 DIAGNOSIS — C79.51 SECONDARY MALIGNANT NEOPLASM OF BONE: ICD-10-CM

## 2024-04-18 PROCEDURE — 96402 CHEMO HORMON ANTINEOPL SQ/IM: CPT

## 2024-04-18 PROCEDURE — 96415 CHEMO IV INFUSION ADDL HR: CPT

## 2024-04-18 PROCEDURE — 96413 CHEMO IV INFUSION 1 HR: CPT

## 2024-04-18 PROCEDURE — 96367 TX/PROPH/DG ADDL SEQ IV INF: CPT

## 2024-04-18 PROCEDURE — 96375 TX/PRO/DX INJ NEW DRUG ADDON: CPT

## 2024-04-18 RX ORDER — FAMOTIDINE 40 MG
20 TABLET ORAL ONCE
Refills: 0 | Status: COMPLETED | OUTPATIENT
Start: 2024-04-18 | End: 2024-04-18

## 2024-04-18 RX ORDER — DIPHENHYDRAMINE HCL 12.5MG/5ML
50 ELIXIR ORAL ONCE
Refills: 0 | Status: COMPLETED | OUTPATIENT
Start: 2024-04-18 | End: 2024-04-18

## 2024-04-18 RX ORDER — LEUPROLIDE ACETATE 15 MG
22.5 KIT INTRAMUSCULAR ONCE
Refills: 0 | Status: COMPLETED | OUTPATIENT
Start: 2024-04-18 | End: 2024-04-18

## 2024-04-18 RX ORDER — ONDANSETRON HYDROCHLORIDE 2 MG/ML
16 INJECTION INTRAMUSCULAR; INTRAVENOUS ONCE
Refills: 0 | Status: COMPLETED | OUTPATIENT
Start: 2024-04-18 | End: 2024-04-18

## 2024-04-18 RX ORDER — DEXAMETHASONE 1 MG/1
4 TABLET ORAL ONCE
Refills: 0 | Status: COMPLETED | OUTPATIENT
Start: 2024-04-18 | End: 2024-04-18

## 2024-04-18 RX ORDER — DOCETAXEL 20 MG/ML
130 INJECTION, SOLUTION, CONCENTRATE INTRAVENOUS ONCE
Refills: 0 | Status: COMPLETED | OUTPATIENT
Start: 2024-04-18 | End: 2024-04-18

## 2024-04-18 RX ADMIN — ONDANSETRON HYDROCHLORIDE 116 MILLIGRAM(S): 2 INJECTION INTRAMUSCULAR; INTRAVENOUS at 16:11

## 2024-04-18 RX ADMIN — ONDANSETRON HYDROCHLORIDE 16 MILLIGRAM(S): 2 INJECTION INTRAMUSCULAR; INTRAVENOUS at 16:21

## 2024-04-18 RX ADMIN — Medication 102 MILLIGRAM(S): at 16:41

## 2024-04-18 RX ADMIN — Medication 50 MILLIGRAM(S): at 17:01

## 2024-04-18 RX ADMIN — DOCETAXEL 130 MILLIGRAM(S): 20 INJECTION, SOLUTION, CONCENTRATE INTRAVENOUS at 17:15

## 2024-04-18 RX ADMIN — Medication 22.5 MILLIGRAM(S): at 16:13

## 2024-04-18 RX ADMIN — DEXAMETHASONE 102 MILLIGRAM(S): 1 TABLET ORAL at 16:21

## 2024-04-18 RX ADMIN — Medication 104 MILLIGRAM(S): at 17:00

## 2024-04-18 RX ADMIN — Medication 20 MILLIGRAM(S): at 17:15

## 2024-04-18 RX ADMIN — DEXAMETHASONE 4 MILLIGRAM(S): 1 TABLET ORAL at 16:40

## 2024-04-18 RX ADMIN — DOCETAXEL 130 MILLIGRAM(S): 20 INJECTION, SOLUTION, CONCENTRATE INTRAVENOUS at 19:09

## 2024-04-23 ENCOUNTER — RX CHANGE (OUTPATIENT)
Age: 74
End: 2024-04-23

## 2024-04-23 RX ORDER — BICALUTAMIDE 50 MG/1
50 TABLET ORAL DAILY
Qty: 30 | Refills: 0 | Status: DISCONTINUED | COMMUNITY
Start: 2024-04-01 | End: 2024-04-23

## 2024-05-09 ENCOUNTER — APPOINTMENT (OUTPATIENT)
Age: 74
End: 2024-05-09
Payer: MEDICARE

## 2024-05-09 ENCOUNTER — LABORATORY RESULT (OUTPATIENT)
Age: 74
End: 2024-05-09

## 2024-05-09 VITALS
DIASTOLIC BLOOD PRESSURE: 69 MMHG | BODY MASS INDEX: 24.49 KG/M2 | HEART RATE: 87 BPM | HEIGHT: 65 IN | TEMPERATURE: 97.8 F | WEIGHT: 147 LBS | SYSTOLIC BLOOD PRESSURE: 125 MMHG

## 2024-05-09 DIAGNOSIS — D72.829 ELEVATED WHITE BLOOD CELL COUNT, UNSPECIFIED: ICD-10-CM

## 2024-05-09 LAB
HCT VFR BLD CALC: 27.6 %
HGB BLD-MCNC: 9.1 G/DL
MCHC RBC-ENTMCNC: 29.6 PG
MCHC RBC-ENTMCNC: 33 G/DL
MCV RBC AUTO: 89.9 FL
PLATELET # BLD AUTO: 210 K/UL
PMV BLD: 9.5 FL
RBC # BLD: 3.07 M/UL
RBC # FLD: 14.9 %
WBC # FLD AUTO: 19.5 K/UL

## 2024-05-09 PROCEDURE — 99214 OFFICE O/P EST MOD 30 MIN: CPT

## 2024-05-09 NOTE — REVIEW OF SYSTEMS
[Constipation] : constipation [Diarrhea: Grade 0] : Diarrhea: Grade 0 [Negative] : Allergic/Immunologic [Fatigue] : fatigue [Fever] : no fever [Chills] : no chills [Night Sweats] : no night sweats [Eye Pain] : no eye pain [Dysphagia] : no dysphagia [Odynophagia] : no odynophagia [Chest Pain] : no chest pain [Palpitations] : no palpitations [Shortness Of Breath] : no shortness of breath [Abdominal Pain] : no abdominal pain [Dysuria] : no dysuria [Incontinence] : no incontinence [Joint Pain] : no joint pain [Skin Rash] : no skin rash [Confused] : no confusion [Suicidal] : not suicidal [FreeTextEntry2] : reports dysgeusia [FreeTextEntry8] : hematuria from urethra, urostomy bag draining clear yellow

## 2024-05-09 NOTE — ASSESSMENT
[FreeTextEntry1] : 74 YO man, non smoker, was evaluated inpatient for abdomen pain and found to have pelvic mass (see CT scan below), with lymphadenopathy and bone masses.   # Metastatic Prostate Cancer, Stage IV, prognostic grade group 5, [place] score 10 (5+5), dx 03/2024  - s/p left iliac bone biopsy with IR on 3.15.2024 which was diagnostic for metastatic carcinoma of the prostate - s/p TURP 3/29/24  - Perineural invasion is identified. - Rare focus suspicious for lymphovascular invasion. - reviewed radiology, pathology and lab workup and had a discussion regarding implications of diagnosis, prognosis and options for management including but not limited to chemotherapy + ADT  - c/w Casodex 50mg once daily, initiated 4.1.2024  - c/w Lupron 22.5mg every 12 weeks , initiated 4.18.2024, next due 07/2024  - c/w cycle 2 of Taxotere on 5/9, initiated 4.18.2024  - c/w pre-medication of Taxotere 8mg BiD starting day for 3 days starting 1 day prior to TAXOTERE administration in order to reduce the incidence and severity of fluid retention as well as the severity of hypersensitivity reactions - START Zometa every 3 weeks to preserve bone strength and prevent fracture; risks vs benefits discussed, dental clearance obtained  - plan to order PET PSMA at next visit to be done following 4 cycles of chemotherapy  - followup with URO, Dr. 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

## 2024-05-09 NOTE — REASON FOR VISIT
[Follow-Up Visit] : a follow-up [Family Member] : family member [Spouse] : spouse [FreeTextEntry2] : metastatic neoplasm

## 2024-05-10 LAB
ALBUMIN SERPL ELPH-MCNC: 4.3 G/DL
ALP BLD-CCNC: 4188 U/L
ALT SERPL-CCNC: 17 U/L
ANION GAP SERPL CALC-SCNC: 14 MMOL/L
AST SERPL-CCNC: 13 U/L
BILIRUB DIRECT SERPL-MCNC: <0.2 MG/DL
BILIRUB INDIRECT SERPL-MCNC: >0.1 MG/DL
BILIRUB SERPL-MCNC: 0.3 MG/DL
BUN SERPL-MCNC: 29 MG/DL
CALCIUM SERPL-MCNC: 8.5 MG/DL
CHLORIDE SERPL-SCNC: 104 MMOL/L
CO2 SERPL-SCNC: 21 MMOL/L
CREAT SERPL-MCNC: 1.2 MG/DL
EGFR: 64 ML/MIN/1.73M2
GLUCOSE SERPL-MCNC: 121 MG/DL
POTASSIUM SERPL-SCNC: 5.1 MMOL/L
PROT SERPL-MCNC: 6.4 G/DL
SODIUM SERPL-SCNC: 139 MMOL/L

## 2024-05-13 ENCOUNTER — NON-APPOINTMENT (OUTPATIENT)
Age: 74
End: 2024-05-13

## 2024-05-13 ENCOUNTER — APPOINTMENT (OUTPATIENT)
Dept: UROLOGY | Facility: CLINIC | Age: 74
End: 2024-05-13
Payer: MEDICARE

## 2024-05-13 VITALS
HEART RATE: 83 BPM | TEMPERATURE: 98 F | DIASTOLIC BLOOD PRESSURE: 76 MMHG | OXYGEN SATURATION: 97 % | HEIGHT: 65 IN | SYSTOLIC BLOOD PRESSURE: 113 MMHG | WEIGHT: 147 LBS | BODY MASS INDEX: 24.49 KG/M2

## 2024-05-13 PROCEDURE — 99214 OFFICE O/P EST MOD 30 MIN: CPT | Mod: 24

## 2024-05-13 PROCEDURE — G2211 COMPLEX E/M VISIT ADD ON: CPT

## 2024-05-13 NOTE — ASSESSMENT
[FreeTextEntry1] : CHARLY SULLIVAN is a 73-year-old male with hx of HTN, HLD, BPH on finasteride since ~2014, presented to [facility] with 1 liter urinary retention and obstructing left bladder wall vs prostate mass causing left hydronephrosis, JIGNESH, bone and pelvic LN possible metastases sp IR bone biopsy referred by Dr Aragon. KELVIN very suspicious for prostate cancer. sp TURP, bladder stone removal. significant BPH with median lobe, right lateral lobe BPH noted completely resected left lateral lobe partially resected and growing under left UO (bullous changes noted on bladder). prostatic urethra wide open at conclusion of procedure (03/29/2024), of note catheter hypospadias noted pathology Gl 5+5 PCa. Bone biopsy pathology confirms metastatic prostate cancer. Currently seeing oncology and is on ADT and Taxotere, initiated 04/2024.   - plan left PCN exchange end of 06/2024. - cont f/u with oncology for advanced prostate cancer treatment. he is continuing ADT and taxotere. - f/u with me in 3 months.  Once treatments have completed we will have nephrostogram in the future to reassess the hydronephrosis and obstruction.  Theoretically after treatment he may have resolution of the obstruction

## 2024-05-13 NOTE — HISTORY OF PRESENT ILLNESS
[FreeTextEntry1] : CHARLY SULLIVAN is a 73-year-old male with hx of HTN, HLD, BPH on finasteride since ~2014, presented to [facility] with 1 liter urinary retention and obstructing left bladder wall vs prostate mass causing left hydronephrosis, JIGNESH, bone and pelvic LN possible metastases sp IR bone biopsy referred by Dr Aragon. KELVIN very suspicious for prostate cancer. sp TURP, bladder stone removal. significant BPH with median lobe, right lateral lobe BPH noted completely resected left lateral lobe partially resected and growing under left UO (bullous changes noted on bladder). prostatic urethra wide open at conclusion of procedure (03/29/2024), of note catheter hypospadias noted. Bone biopsy pathology confirms metastatic prostate cancer. Currently seeing oncology and is on ADT and Taxotere, initiated 04/2024.   Pt presents today with wife. He reports good flow and stream. Denies flank pain, gross hematuria, dysuria or associated symptoms.   Labs 05/09/2024 Cr 1.2 GFR 64 Ca 8.5 K 5.1  PVR 80cc  previously, Doing well no issues since turp, no f/c. took abx  Pathology 3/15/2024 - LEFT ILIAC BONE LESION, CT GUIDED-BIOPSY AND IMPRINTS: - POSITIVE FOR MALIGNANT CELLS. - Metastatic carcinoma of the prostate (see comment).  previously NM Bone Scan 03/11/2024 - images independently reviewed by me IMPRESSION: Extensive multifocal increased activity throughout the bones, compatible with diffuse osseous metastatic disease as described above.  RBUS 03/12/2024 - IMPRESSION: Mild left-sided hydronephrosis without change. Enlarged prostate. Hensley catheter within the urinary bladder with residual urine within. (Right kidney: 1.6 cm parenchymal cyst. Urinary bladder: Hensley catheter is seen within the urinary bladder. Urinary bladder has 300 cc of urine in it. The prostate is enlarged, measuring greater than 110 cc.)  CT AP w/ IV Cont 03/08/2024 - images independently reviewed by me -agree with findings there is large tumor in the vicinity of the bladder neck/prostate extending towards the left trigone IMPRESSION: Left lateral urinary bladder wall masslike thickening, contiguous with prostate, with extravesicular tumor infiltration. Mass at region of left UVJ, with resultant moderate left hydroureteronephrosis. Urinary bladder distended. Right mild hydronephrosis or fullness. BPH with median lobe hypertrophy. 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 Findings compatible with metastatic neoplasm; prostate cancer invading bladder favored over primary urinary bladder neoplasm given suspicious osteoblastic metastatic bone disease.. Correlation with PSA recommended.  Suspicious pelvic lymphadenopathy catalogued above. Mild colonic stool burden. No bowel obstruction. Relation of prostate gland and rectum limited by modality.  Labs 03/17/2024 Cr 1.8  GFR 40  Ca 8.1  K 3.8 UA - proteinuria, 654 RBCs, 35 WBCs, bacteriuria. Microhematuria.  UCx - >100,000 CFU/ml Enterococcus faecalis x2 Blood Cx - no growth    history: Denies previous kidney stones, recurrent UTIs. Prior urologist in  with hx of bladder stones sp cystoscopic removal  Family History: brother had PCa.  Social History: works for [employer], Non-smoker, niece is a nurse,   Dr. Aragon notes reviewed by me -  1. Dr Perez today -- pt's urologist in [place]  PSA -- was about 2.7 in July 2023 at that same time also had CT scan which had no signs of metastatic disease.  PSA was drawn which was in the 9s. (PSA was 2.7 in July 2023 per his urologist) On March 15th he underwent biopsy of bone lesion by IR -- results still pending

## 2024-05-13 NOTE — ADDENDUM
[FreeTextEntry1] : Patient's note was transcribed with the assistance of a medical scribe under the supervision of Dr. Zhang. I, Dr. Zhang, have reviewed the patient's chart and agree that it aligns with my medical decisions. Irina Lee, our scribe, also served as a chaperone for physical examination purposes.  The submitted E/M billing level for this visit reflects the total time spent on the day of the visit including face-to-face time spent with the patient, non-face-to-face review of medical records and relevant information, documentation, and asynchronous communication with the patient after a visit via phone, email, or patients EHR portal after the visit.  The medical records reviewed are either scanned into the chart or reviewed with the patient using a patients electronic medical records portal for patients with records not available to [facility] via electronic transmission platforms from other institutions and labs.  Time spend counseling and performing coordination of care was also included in determining the appropriate EM billing level.

## 2024-05-20 ENCOUNTER — NON-APPOINTMENT (OUTPATIENT)
Age: 74
End: 2024-05-20

## 2024-05-28 RX ORDER — DEXAMETHASONE 4 MG/1
4 TABLET ORAL
Qty: 12 | Refills: 6 | Status: ACTIVE | COMMUNITY
Start: 2024-04-08 | End: 1900-01-01

## 2024-05-30 ENCOUNTER — APPOINTMENT (OUTPATIENT)
Age: 74
End: 2024-05-30
Payer: MEDICARE

## 2024-05-30 ENCOUNTER — OUTPATIENT (OUTPATIENT)
Dept: OUTPATIENT SERVICES | Facility: HOSPITAL | Age: 74
LOS: 1 days | Discharge: HOME | End: 2024-05-30

## 2024-05-30 ENCOUNTER — LABORATORY RESULT (OUTPATIENT)
Age: 74
End: 2024-05-30

## 2024-05-30 VITALS
HEIGHT: 66.54 IN | DIASTOLIC BLOOD PRESSURE: 67 MMHG | WEIGHT: 141 LBS | BODY MASS INDEX: 22.39 KG/M2 | RESPIRATION RATE: 16 BRPM | SYSTOLIC BLOOD PRESSURE: 108 MMHG | HEART RATE: 92 BPM | OXYGEN SATURATION: 100 % | TEMPERATURE: 97.7 F

## 2024-05-30 DIAGNOSIS — R97.20 ELEVATED PROSTATE, SPECIFIC ANTIGEN [PSA]: ICD-10-CM

## 2024-05-30 DIAGNOSIS — Z90.49 ACQUIRED ABSENCE OF OTHER SPECIFIED PARTS OF DIGESTIVE TRACT: Chronic | ICD-10-CM

## 2024-05-30 DIAGNOSIS — N21.0 CALCULUS IN BLADDER: Chronic | ICD-10-CM

## 2024-05-30 DIAGNOSIS — N32.89 OTHER SPECIFIED DISORDERS OF BLADDER: ICD-10-CM

## 2024-05-30 LAB
HCT VFR BLD CALC: 27.5 %
HGB BLD-MCNC: 9.1 G/DL
MCHC RBC-ENTMCNC: 29.6 PG
MCHC RBC-ENTMCNC: 33.1 G/DL
MCV RBC AUTO: 89.6 FL
PLATELET # BLD AUTO: 222 K/UL
PMV BLD: 9.8 FL
RBC # BLD: 3.07 M/UL
RBC # FLD: 17 %
WBC # FLD AUTO: 23.82 K/UL

## 2024-05-30 PROCEDURE — 99215 OFFICE O/P EST HI 40 MIN: CPT

## 2024-05-30 RX ORDER — BICALUTAMIDE 50 MG/1
50 TABLET ORAL
Qty: 90 | Refills: 1 | Status: DISCONTINUED | COMMUNITY
Start: 1900-01-01 | End: 2024-05-30

## 2024-05-30 RX ORDER — BICALUTAMIDE 50 MG/1
50 TABLET ORAL DAILY
Qty: 30 | Refills: 6 | Status: DISCONTINUED | COMMUNITY
Start: 2024-04-08 | End: 2024-05-30

## 2024-05-30 NOTE — PRE PROCEDURE NOTE - PRE PROCEDURE EVALUATION
74yo M PMH percutaneous left nephrostomy catheter placement on 3/27 presents today for suture replacement.    Plan for L PCN suture replacement with local today 5/30

## 2024-05-30 NOTE — ASSESSMENT
[FreeTextEntry1] : 74 YO man, non smoker, was evaluated inpatient for abdomen pain and found to have pelvic mass (see CT scan below), with lymphadenopathy and bone masses.   # Metastatic Prostate Cancer, Stage IV, prognostic grade group 5, [place] score 10 (5+5), dx 03/2024  - s/p left iliac bone biopsy with IR on 3.15.2024 which was diagnostic for metastatic carcinoma of the prostate - s/p TURP 3/29/24  - Perineural invasion is identified. - Rare focus suspicious for lymphovascular invasion. - reviewed radiology, pathology and lab workup and had a discussion regarding implications of diagnosis, prognosis and options for management including but not limited to chemotherapy + ADT  - STOP Casodex 50mg once daily, initiated 4.1.2024 - 05/2024  - c/w Lupron 22.5mg every 12 weeks , initiated 4.18.2024, next due 07/2024  - c/w cycle 3 of Taxotere on 5/30, initiated 4.18.2024  - c/w pre-medication of Taxotere 8mg BiD starting day for 3 days starting 1 day prior to TAXOTERE administration in order to reduce the incidence and severity of fluid retention as well as the severity of hypersensitivity reactions - c/w Zometa IV every 3 weeks to preserve bone strength and prevent fracture; risks vs benefits discussed, dental clearance obtained  - START Darolutamide 600mg every 12 hours, possible side effects discussed + Rx sent.  Tasked Clinical Pharmacist, Malena Morales, to contact patient to ensure receipt of medication and reiterate instructions/adverse event profile. - plan to order PET PSMA at next visit to be done following 4 cycles of chemotherapy  - followup with URO, Dr. 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 Cheung with CBC, BMP, LFTs, PSA level 2 days prior  seen/ examined w/ NP Vanessa; note reviewed; case discussed; agree w/ plan  continue ADT continue taxotere, proceed w/ c3; will get PET restaging after c4 add Darolutamide beginning 5/30/2024 get PSA

## 2024-05-30 NOTE — HISTORY OF PRESENT ILLNESS
[Therapy: ___] : Therapy: [unfilled] [Cycle: ___] : Cycle: [unfilled] [de-identified] : 74yo Male with pmh of BPH (on finasteride), HTN, HLD presented to the ER with lower abdominal pain and constipation for the past few days. The patient follows with Urologist Dr. Perez and private Oncologist in [place]. The patient mentioned that he is not aware of the prostate cancer diagnosis and was being managed for BPH by urology. He mentioned that he had a bladder stone removal in 2021 and had colonoscopy recently in Feb which showed 5 polyps. The patient has increase in urinary frequency and nocturia but denies any dysuria and attributes it to Finasteride. He has FHx ( 2 brothers) of prostate cancer. The patient mentioned that he would like to see his private oncologist rather than get treatment with oncology team here. The patient has been making urine and Hens[place] was in ED as requested by urology and has mild hematuria (likely due to traumatic [place]).  CT A/P 3/8/24: KIDNEYS/PELVIC ORGANS: Left lateral urinary bladder wall masslike thickening, contiguous with prostate, with extravesicular tumor infiltration. Mass at region of left UVJ, with resultant moderate left hydroureteronephrosis. Urinary bladder distended. BPH with median lobe hypertrophy. Three urinary bladder intraluminal calculi, measuring up to 0.5 cm. Relation of prostate gland and rectum limited by modality. Right mild hydronephrosis or fullness. ABDOMINOPELVIC NODES: Suspicious pelvic lymphadenopathy, including left medial external iliac chain 2 x 1.6 cm lymph node, right internal iliac chain 1.9 x 1.2 cm lymph node, multiple presacral lymph nodes measuring up to 0.9 cm short axis, and perirectal lymph nodes measuring up to 1.5 x 1 cm.  PERITONEUM/MESENTERY/BOWEL: Post appendectomy. No bowel obstruction. No ascites. Colonic diverticulosis. Mild colonic stool burden..  BONES/SOFT TISSUES: Patchy faint sclerosis throughout visualized spine, bony pelvis and bilateral ribs, suspicious for osteoblastic metastatic bone disease.  OTHER: Vascular calcifications.   IMPRESSION:  1. Left lateral urinary bladder wall masslike thickening, contiguous with prostate, with extravesicular tumor infiltration. Mass at region of left UVJ, with resultant moderate left hydroureteronephrosis. Urinary bladder distended. Right mild hydronephrosis or fullness. BPH with median lobe hypertrophy. 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 Findings compatible with metastatic neoplasm; prostate cancer invading bladder favored over primary urinary bladder neoplasm given suspicious osteoblastic metastatic bone disease.. Correlation with PSA recommended.  2. Suspicious pelvic lymphadenopathy, catalogued above.  3. Mild colonic stool burden. No bowel obstruction. Relation of prostate gland and rectum limited by modality.     BONE SCAN 3/11/24 Extensive multifocal increased activity throughout the bones, compatible with diffuse osseous metastatic disease. There is multifocal increased activity in the bilateral ribs, right humeral head, left mid humerus, pelvis, bilateral femurs, thoracolumbar spine, and calvarium.  [FreeTextEntry1] : Started Taxotere on 4.18.2024  [de-identified] : 4/8/24 Patient is here for a follow-up visit for metastatic prostate cancer, accompanied by son.  He is feeling well with no new complaints.  Reviewed pathology from recent left iliac bone biopsy which was diagnostic for metastatic carcinoma of the prostate.  He recently started Casodex 50mg once daily on 4.1.2024 as per recommendations from Urology.  Patient denies fever, chills, nausea, vomiting or dyspnea.  He reports hematuria.     5/9/24 Patient is here for a follow-up visit for metastatic prostate cancer, accompanied by spouse.  Patient started Lupron 22.5mg every 3 months on 4.18.2024.  Reviewed most recent CBC which shows moderate leukocytosis and anemia with hgb 9.1g/dL.  Most recent PSA was 6.16ng/mL from 04/2024.  He continues on Casodex 50mg once daily.  He has not had any more episodes of hematuria.   He is due for cycle 2 of Taxotere on 5/9, initiated 4.18.2024.  He reports dysgeusia and fatigue.  Patient denies fever, chills, nausea, vomiting or dyspnea.    5/30/24 Patient is here for a follow-up visit for metastatic prostate cancer, accompanied by spouse.  Patient started Lupron 22.5mg every 3 months on 4.18.2024.  Reviewed most recent CBC which shows moderate leukocytosis and anemia with hgb 9.1g/dL.  Most recent PSA was 6.16ng/mL from 04/2024.  He continues on Casodex 50mg once daily.  He has not had any more episodes of hematuria.   He is due for cycle 3 of Taxotere on 5/30, initiated 4.18.2024.  He reports dysgeusia and fatigue which has affected his appetite and results in weight loss since last visit.  Patient denies fever, chills, nausea, vomiting or dyspnea.

## 2024-05-30 NOTE — REVIEW OF SYSTEMS
Delivery Note    Ese Navarro is a 32 year old now  female post primary  delivery at 37w2d due to SROM and breech presentation.  Pregnancy was significant for GDM A2.    Mother's Information    Lacerations    Episiotomy:  None  Perineal laceration:  None  Other lacerations?:  No     IO Blood Loss  20 1105 - 20 1217    None           Rmaon, Girl Ese [[id number]]     Delivery    Birth date/time:  2019 11:40:52  Delivery type:  , Low Transverse  Trial of labor:  No   categorization:  primary   priority:  routine  Indications for :  Breech  Incision type:  low transverse     Labor Events     labor?:  No   steroids?:  None  Antibiotics during labor?:  Yes  Sac identifier:  Sac 1  Rupture date/time:  2020 0505  Rupture type:  Spontaneous  Fluid color:  Clear  Fluid odor:  Normal  Induction:  None  Augmentation:  None  Labor/Delivery complications:  None     Anesthesia    Method:  Spinal     Operative Delivery    Forceps attempted?:  No  Vacuum extractor attempted?:  No     Shoulder Dystocia    Shoulder dystocia present?:  No     Presentation    Presentation:  Breech     Placenta    Placenta delivery date/time:  2020 1143  Placenta removal:  Expressed  Placenta appearance:  Intact  Placenta disposition:  discarded     Cord    Complications:  Nuchal  Nuchal intervention:  reduced  Nuchal cord description:  loose nuchal cord  Number of loops:  2  Delayed cord clamping?:  No  Cord clamped date/time:  2020 1140  Cord blood disposition:  Lab  Gases sent?:  Yes  Stem cell collection (by provider):  No     Apgars    Living status:  Living  Apgars:   1 min.:   5 min.:   10 min.:   15 min.:   20 min.:     Skin color:   0  1       Heart rate:   1  2       Reflex irritability:   0  2       Muscle tone:   0  1       Respiratory effort:   0  2       Total:   1  8       Apgars assigned by:  NICU TEAM     Resuscitation    Method:   See Resuscitation Record     Measurements    Weight:  3572 g  Length:  53.3 cm  Head circumference:  37 cm     Delivery Providers    Delivering clinician:  Sam Gage,    Provider Role    Michelle Deng, RN Delivery Nurse    Magaly Garcia, RN Baby Nurse    Ana Chapman, HUC Tech    Zenaida POLANCO MD Assistant Surgeon    Magda Tubbs MD Anesthesiologist    Deonna Thomas, RRT Respiratory Therapist    Desiree Dunham, RN Baby Nurse    Lotus Woods, CRNA CRNA               Review the Delivery Report for details       Sam Gage, DO       [Fatigue] : fatigue [Constipation] : constipation [Diarrhea: Grade 0] : Diarrhea: Grade 0 [Negative] : Allergic/Immunologic [Recent Change In Weight] : ~T recent weight change [Fever] : no fever [Chills] : no chills [Night Sweats] : no night sweats [Eye Pain] : no eye pain [Dysphagia] : no dysphagia [Odynophagia] : no odynophagia [Chest Pain] : no chest pain [Palpitations] : no palpitations [Shortness Of Breath] : no shortness of breath [Abdominal Pain] : no abdominal pain [Dysuria] : no dysuria [Incontinence] : no incontinence [Joint Pain] : no joint pain [Skin Rash] : no skin rash [Confused] : no confusion [Suicidal] : not suicidal [FreeTextEntry2] : reports dysgeusia, weight loss due to poor appetite  [FreeTextEntry8] : hematuria from urethra, urostomy bag draining clear yellow

## 2024-05-31 ENCOUNTER — NON-APPOINTMENT (OUTPATIENT)
Age: 74
End: 2024-05-31

## 2024-05-31 DIAGNOSIS — N32.89 OTHER SPECIFIED DISORDERS OF BLADDER: ICD-10-CM

## 2024-05-31 LAB
ALBUMIN SERPL ELPH-MCNC: 4.1 G/DL
ALP BLD-CCNC: 2604 U/L
ALT SERPL-CCNC: 21 U/L
ANION GAP SERPL CALC-SCNC: 15 MMOL/L
AST SERPL-CCNC: 16 U/L
BILIRUB DIRECT SERPL-MCNC: <0.2 MG/DL
BILIRUB INDIRECT SERPL-MCNC: >0.1 MG/DL
BILIRUB SERPL-MCNC: 0.3 MG/DL
BUN SERPL-MCNC: 36 MG/DL
CALCIUM SERPL-MCNC: 8.2 MG/DL
CHLORIDE SERPL-SCNC: 103 MMOL/L
CO2 SERPL-SCNC: 18 MMOL/L
CREAT SERPL-MCNC: 1.4 MG/DL
EGFR: 53 ML/MIN/1.73M2
GLUCOSE SERPL-MCNC: 170 MG/DL
POTASSIUM SERPL-SCNC: 4.9 MMOL/L
PROT SERPL-MCNC: 6.3 G/DL
SODIUM SERPL-SCNC: 136 MMOL/L

## 2024-05-31 RX ORDER — ATORVASTATIN CALCIUM 20 MG/1
20 TABLET, FILM COATED ORAL DAILY
Refills: 0 | Status: ACTIVE | COMMUNITY

## 2024-05-31 RX ORDER — FINASTERIDE 5 MG/1
5 TABLET, FILM COATED ORAL DAILY
Refills: 0 | Status: ACTIVE | COMMUNITY

## 2024-05-31 RX ORDER — CIPROFLOXACIN HYDROCHLORIDE 500 MG/1
500 TABLET, FILM COATED ORAL
Qty: 14 | Refills: 0 | Status: DISCONTINUED | COMMUNITY
Start: 2024-03-21 | End: 2024-05-31

## 2024-05-31 RX ORDER — OLMESARTAN MEDOXOMIL 40 MG/1
40 TABLET, FILM COATED ORAL DAILY
Refills: 0 | Status: ACTIVE | COMMUNITY

## 2024-06-18 ENCOUNTER — APPOINTMENT (OUTPATIENT)
Age: 74
End: 2024-06-18

## 2024-06-19 ENCOUNTER — APPOINTMENT (OUTPATIENT)
Age: 74
End: 2024-06-19

## 2024-06-19 ENCOUNTER — LABORATORY RESULT (OUTPATIENT)
Age: 74
End: 2024-06-19

## 2024-06-19 ENCOUNTER — OUTPATIENT (OUTPATIENT)
Dept: OUTPATIENT SERVICES | Facility: HOSPITAL | Age: 74
LOS: 1 days | End: 2024-06-19
Payer: MEDICARE

## 2024-06-19 DIAGNOSIS — C61 MALIGNANT NEOPLASM OF PROSTATE: ICD-10-CM

## 2024-06-19 LAB
ALBUMIN SERPL ELPH-MCNC: 3.6 G/DL
ALP BLD-CCNC: 1368 U/L
ALT SERPL-CCNC: 24 U/L
ANION GAP SERPL CALC-SCNC: 12 MMOL/L
AST SERPL-CCNC: 18 U/L
BILIRUB DIRECT SERPL-MCNC: <0.2 MG/DL
BILIRUB INDIRECT SERPL-MCNC: >0.3 MG/DL
BILIRUB SERPL-MCNC: 0.5 MG/DL
BUN SERPL-MCNC: 28 MG/DL
CALCIUM SERPL-MCNC: 7.8 MG/DL
CHLORIDE SERPL-SCNC: 108 MMOL/L
CO2 SERPL-SCNC: 19 MMOL/L
CREAT SERPL-MCNC: 1.3 MG/DL
EGFR: 58 ML/MIN/1.73M2
GLUCOSE SERPL-MCNC: 95 MG/DL
HCT VFR BLD CALC: 24.2 %
HGB BLD-MCNC: 7.8 G/DL
MCHC RBC-ENTMCNC: 29.3 PG
MCHC RBC-ENTMCNC: 32.2 G/DL
MCV RBC AUTO: 91 FL
PLATELET # BLD AUTO: 178 K/UL
PMV BLD: 9.5 FL
POTASSIUM SERPL-SCNC: 4.5 MMOL/L
PROT SERPL-MCNC: 5.3 G/DL
RBC # BLD: 2.66 M/UL
RBC # FLD: 18.7 %
SODIUM SERPL-SCNC: 139 MMOL/L
WBC # FLD AUTO: 9.36 K/UL

## 2024-06-19 PROCEDURE — 85027 COMPLETE CBC AUTOMATED: CPT

## 2024-06-19 PROCEDURE — 80076 HEPATIC FUNCTION PANEL: CPT

## 2024-06-19 PROCEDURE — 36415 COLL VENOUS BLD VENIPUNCTURE: CPT

## 2024-06-19 PROCEDURE — 84153 ASSAY OF PSA TOTAL: CPT

## 2024-06-19 PROCEDURE — 80048 BASIC METABOLIC PNL TOTAL CA: CPT

## 2024-06-20 ENCOUNTER — APPOINTMENT (OUTPATIENT)
Age: 74
End: 2024-06-20
Payer: MEDICARE

## 2024-06-20 ENCOUNTER — OUTPATIENT (OUTPATIENT)
Dept: OUTPATIENT SERVICES | Facility: HOSPITAL | Age: 74
LOS: 1 days | End: 2024-06-20
Payer: MEDICARE

## 2024-06-20 VITALS
RESPIRATION RATE: 16 BRPM | WEIGHT: 149 LBS | HEIGHT: 66 IN | HEART RATE: 88 BPM | BODY MASS INDEX: 23.95 KG/M2 | OXYGEN SATURATION: 99 % | DIASTOLIC BLOOD PRESSURE: 60 MMHG | SYSTOLIC BLOOD PRESSURE: 101 MMHG | TEMPERATURE: 98 F

## 2024-06-20 VITALS — DIASTOLIC BLOOD PRESSURE: 63 MMHG | TEMPERATURE: 98 F | SYSTOLIC BLOOD PRESSURE: 121 MMHG | HEART RATE: 88 BPM

## 2024-06-20 DIAGNOSIS — Z51.81 ENCOUNTER FOR THERAPEUTIC DRUG LVL MONITORING: ICD-10-CM

## 2024-06-20 DIAGNOSIS — C61 MALIGNANT NEOPLASM OF PROSTATE: ICD-10-CM

## 2024-06-20 DIAGNOSIS — Z90.49 ACQUIRED ABSENCE OF OTHER SPECIFIED PARTS OF DIGESTIVE TRACT: Chronic | ICD-10-CM

## 2024-06-20 DIAGNOSIS — C79.51 MALIGNANT NEOPLASM OF PROSTATE: ICD-10-CM

## 2024-06-20 DIAGNOSIS — E83.51 HYPOCALCEMIA: ICD-10-CM

## 2024-06-20 DIAGNOSIS — R60.0 LOCALIZED EDEMA: ICD-10-CM

## 2024-06-20 DIAGNOSIS — N32.89 OTHER SPECIFIED DISORDERS OF BLADDER: ICD-10-CM

## 2024-06-20 DIAGNOSIS — D64.9 ANEMIA, UNSPECIFIED: ICD-10-CM

## 2024-06-20 LAB — PSA SERPL-MCNC: 0.38 NG/ML

## 2024-06-20 PROCEDURE — 96413 CHEMO IV INFUSION 1 HR: CPT

## 2024-06-20 PROCEDURE — 36415 COLL VENOUS BLD VENIPUNCTURE: CPT

## 2024-06-20 PROCEDURE — 86900 BLOOD TYPING SEROLOGIC ABO: CPT

## 2024-06-20 PROCEDURE — 99215 OFFICE O/P EST HI 40 MIN: CPT

## 2024-06-20 PROCEDURE — 86923 COMPATIBILITY TEST ELECTRIC: CPT

## 2024-06-20 PROCEDURE — 96375 TX/PRO/DX INJ NEW DRUG ADDON: CPT

## 2024-06-20 PROCEDURE — 86850 RBC ANTIBODY SCREEN: CPT

## 2024-06-20 PROCEDURE — 96367 TX/PROPH/DG ADDL SEQ IV INF: CPT

## 2024-06-20 RX ORDER — DEXAMETHASONE 0.75 MG
4 TABLET ORAL ONCE
Refills: 0 | Status: COMPLETED | OUTPATIENT
Start: 2024-06-20 | End: 2024-06-20

## 2024-06-20 RX ORDER — ONDANSETRON 2 MG/ML
16 INJECTION, SOLUTION INTRAMUSCULAR; INTRAVENOUS ONCE
Refills: 0 | Status: COMPLETED | OUTPATIENT
Start: 2024-06-20 | End: 2024-06-20

## 2024-06-20 RX ORDER — FAMOTIDINE 10 MG/ML
20 INJECTION INTRAVENOUS ONCE
Refills: 0 | Status: COMPLETED | OUTPATIENT
Start: 2024-06-20 | End: 2024-06-20

## 2024-06-20 RX ORDER — DIPHENHYDRAMINE HCL 50 MG
50 CAPSULE ORAL ONCE
Refills: 0 | Status: COMPLETED | OUTPATIENT
Start: 2024-06-20 | End: 2024-06-20

## 2024-06-20 RX ORDER — DOCETAXEL 80 MG/4ML
130 INJECTION, SOLUTION, CONCENTRATE INTRAVENOUS ONCE
Refills: 0 | Status: COMPLETED | OUTPATIENT
Start: 2024-06-20 | End: 2024-06-20

## 2024-06-20 RX ORDER — CALCIUM CARBONATE 500(1250)
500 TABLET ORAL
Qty: 90 | Refills: 1 | Status: ACTIVE | COMMUNITY
Start: 2024-06-20 | End: 1900-01-01

## 2024-06-20 RX ADMIN — Medication 102 MILLIGRAM(S): at 11:30

## 2024-06-20 RX ADMIN — ONDANSETRON 16 MILLIGRAM(S): 2 INJECTION, SOLUTION INTRAMUSCULAR; INTRAVENOUS at 10:45

## 2024-06-20 RX ADMIN — DOCETAXEL 130 MILLIGRAM(S): 80 INJECTION, SOLUTION, CONCENTRATE INTRAVENOUS at 13:45

## 2024-06-20 RX ADMIN — DOCETAXEL 130 MILLIGRAM(S): 80 INJECTION, SOLUTION, CONCENTRATE INTRAVENOUS at 11:55

## 2024-06-20 RX ADMIN — Medication 4 MILLIGRAM(S): at 11:12

## 2024-06-20 RX ADMIN — FAMOTIDINE 20 MILLIGRAM(S): 10 INJECTION INTRAVENOUS at 11:12

## 2024-06-20 RX ADMIN — Medication 50 MILLIGRAM(S): at 11:45

## 2024-06-20 RX ADMIN — ONDANSETRON 116 MILLIGRAM(S): 2 INJECTION, SOLUTION INTRAMUSCULAR; INTRAVENOUS at 10:30

## 2024-06-20 NOTE — PHYSICAL EXAM
[Restricted in physically strenuous activity but ambulatory and able to carry out work of a light or sedentary nature] : Status 1- Restricted in physically strenuous activity but ambulatory and able to carry out work of a light or sedentary nature, e.g., light house work, office work [Normal] : affect appropriate [de-identified] : +1 bilateral ankle edema

## 2024-06-20 NOTE — ASSESSMENT
[FreeTextEntry1] : 72 YO man, non smoker, was evaluated inpatient for abdomen pain and found to have pelvic mass (see CT scan below), with lymphadenopathy and bone masses.   # Metastatic Prostate Cancer, Stage IV, prognostic grade group 5, [place] score 10 (5+5), dx 03/2024  - s/p left iliac bone biopsy with IR on 3.15.2024 which was diagnostic for metastatic carcinoma of the prostate - s/p TURP 3/29/24  - Perineural invasion is identified. - Rare focus suspicious for lymphovascular invasion. - reviewed radiology, pathology and lab workup and had a discussion regarding implications of diagnosis, prognosis and options for management including but not limited to chemotherapy + ADT  - STOPPED Casodex 50mg once daily, initiated 4.1.2024 - 05/2024  - c/w Lupron 22.5mg every 12 weeks , initiated 4.18.2024, next due 07/2024  - c/w cycle 4 of Taxotere on 6/20, initiated 4.18.2024  - c/w pre-medication of Taxotere 8mg BiD starting day for 3 days starting 1 day prior to TAXOTERE administration in order to reduce the incidence and severity of fluid retention as well as the severity of hypersensitivity reactions - c/w Zometa IV every 3 weeks to preserve bone strength and prevent fracture; risks vs benefits discussed, dental clearance obtained  - c/w Darolutamide 600mg every 12 hours , initiated 06/2024  - PET PSMA ordered to be done following 4 cycles of chemotherapy ; PSA has been consistently not elevated based on the extent of tumor involvement, therefore PSA should not serve as guidance of presence or absence of response to treatment, which is why we are ordering radiologic PET/CT PSMA to better evaluate the response  - followup with URO, Dr. 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    RTC in 3 weeks with Dr. Cheung with CBC, BMP, LFTs, PSA level 2 days prior   seen/ examined w/ NP Vanessa; note reviewed; case discussed; agree w/ plan  continue ADT continue taxotere, proceed w/ c4; will get PET restaging after c4 add Darolutamide beginning 5/30/2024 get PSA  second opinion is pending will transfuse prbc x2

## 2024-06-20 NOTE — HISTORY OF PRESENT ILLNESS
[Therapy: ___] : Therapy: [unfilled] [Cycle: ___] : Cycle: [unfilled] [de-identified] : 72yo Male with pmh of BPH (on finasteride), HTN, HLD presented to the ER with lower abdominal pain and constipation for the past few days. The patient follows with Urologist Dr. Perez and private Oncologist in [place]. The patient mentioned that he is not aware of the prostate cancer diagnosis and was being managed for BPH by urology. He mentioned that he had a bladder stone removal in 2021 and had colonoscopy recently in Feb which showed 5 polyps. The patient has increase in urinary frequency and nocturia but denies any dysuria and attributes it to Finasteride. He has FHx ( 2 brothers) of prostate cancer. The patient mentioned that he would like to see his private oncologist rather than get treatment with oncology team here. The patient has been making urine and [place] was in ED as requested by urology and has mild hematuria (likely due to traumatic [place]).  CT A/P 3/8/24: KIDNEYS/PELVIC ORGANS: Left lateral urinary bladder wall masslike thickening, contiguous with prostate, with extravesicular tumor infiltration. Mass at region of left UVJ, with resultant moderate left hydroureteronephrosis. Urinary bladder distended. BPH with median lobe hypertrophy. Three urinary bladder intraluminal calculi, measuring up to 0.5 cm. Relation of prostate gland and rectum limited by modality. Right mild hydronephrosis or fullness. ABDOMINOPELVIC NODES: Suspicious pelvic lymphadenopathy, including left medial external iliac chain 2 x 1.6 cm lymph node, right internal iliac chain 1.9 x 1.2 cm lymph node, multiple presacral lymph nodes measuring up to 0.9 cm short axis, and perirectal lymph nodes measuring up to 1.5 x 1 cm.  PERITONEUM/MESENTERY/BOWEL: Post appendectomy. No bowel obstruction. No ascites. Colonic diverticulosis. Mild colonic stool burden..  BONES/SOFT TISSUES: Patchy faint sclerosis throughout visualized spine, bony pelvis and bilateral ribs, suspicious for osteoblastic metastatic bone disease.  OTHER: Vascular calcifications.   IMPRESSION:  1. Left lateral urinary bladder wall masslike thickening, contiguous with prostate, with extravesicular tumor infiltration. Mass at region of left UVJ, with resultant moderate left hydroureteronephrosis. Urinary bladder distended. Right mild hydronephrosis or fullness. BPH with median lobe hypertrophy. 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 Findings compatible with metastatic neoplasm; prostate cancer invading bladder favored over primary urinary bladder neoplasm given suspicious osteoblastic metastatic bone disease.. Correlation with PSA recommended.  2. Suspicious pelvic lymphadenopathy, catalogued above.  3. Mild colonic stool burden. No bowel obstruction. Relation of prostate gland and rectum limited by modality.     BONE SCAN 3/11/24 Extensive multifocal increased activity throughout the bones, compatible with diffuse osseous metastatic disease. There is multifocal increased activity in the bilateral ribs, right humeral head, left mid humerus, pelvis, bilateral femurs, thoracolumbar spine, and calvarium.  [FreeTextEntry1] : Started Taxotere on 4.18.2024  [de-identified] : 4/8/24 Patient is here for a follow-up visit for metastatic prostate cancer, accompanied by son.  He is feeling well with no new complaints.  Reviewed pathology from recent left iliac bone biopsy which was diagnostic for metastatic carcinoma of the prostate.  He recently started Casodex 50mg once daily on 4.1.2024 as per recommendations from Urology.  Patient denies fever, chills, nausea, vomiting or dyspnea.  He reports hematuria.     5/9/24 Patient is here for a follow-up visit for metastatic prostate cancer, accompanied by spouse.  Patient started Lupron 22.5mg every 3 months on 4.18.2024.  Reviewed most recent CBC which shows moderate leukocytosis and anemia with hgb 9.1g/dL.  Most recent PSA was 6.16ng/mL from 04/2024.  He continues on Casodex 50mg once daily.  He has not had any more episodes of hematuria.   He is due for cycle 2 of Taxotere on 5/9, initiated 4.18.2024.  He reports dysgeusia and fatigue.  Patient denies fever, chills, nausea, vomiting or dyspnea.    5/30/24 Patient is here for a follow-up visit for metastatic prostate cancer, accompanied by spouse.  Patient started Lupron 22.5mg every 3 months on 4.18.2024.  Reviewed most recent CBC which shows moderate leukocytosis and anemia with hgb 9.1g/dL.  Most recent PSA was 6.16ng/mL from 04/2024.  He continues on Casodex 50mg once daily.  He has not had any more episodes of hematuria.   He is due for cycle 3 of Taxotere on 5/30, initiated 4.18.2024.  He reports dysgeusia and fatigue which has affected his appetite and results in weight loss since last visit.  Patient denies fever, chills, nausea, vomiting or dyspnea.    6/20/24 Patient is here for a follow-up visit for metastatic prostate cancer, accompanied by spouse.  Since last visit, patient started Nubeqa (Darolutamide) twice daily in 06/2024.  Patient started Lupron 22.5mg every 3 months on 4.18.2024.  Reviewed most recent CBC which shows severe anemia with hgb 7.8g/dL.  Most recent PSA was 0.38ng/mL from 06/2024.  He continues on Casodex 50mg once daily.  He has not had any more episodes of hematuria.   He is due for cycle 4 of Taxotere on 6/20, initiated 4.18.2024.  He states his energy has been good this past week but occasionally he becomes fatigued.  Patient denies fever, chills, dizziness, chest pain, palpitations, nausea, vomiting or dyspnea.

## 2024-06-21 ENCOUNTER — APPOINTMENT (OUTPATIENT)
Age: 74
End: 2024-06-21

## 2024-06-21 ENCOUNTER — OUTPATIENT (OUTPATIENT)
Dept: OUTPATIENT SERVICES | Facility: HOSPITAL | Age: 74
LOS: 1 days | End: 2024-06-21
Payer: MEDICARE

## 2024-06-21 VITALS — SYSTOLIC BLOOD PRESSURE: 116 MMHG | TEMPERATURE: 98 F | DIASTOLIC BLOOD PRESSURE: 68 MMHG | HEART RATE: 79 BPM

## 2024-06-21 DIAGNOSIS — C61 MALIGNANT NEOPLASM OF PROSTATE: ICD-10-CM

## 2024-06-21 DIAGNOSIS — N21.0 CALCULUS IN BLADDER: Chronic | ICD-10-CM

## 2024-06-21 DIAGNOSIS — Z90.49 ACQUIRED ABSENCE OF OTHER SPECIFIED PARTS OF DIGESTIVE TRACT: Chronic | ICD-10-CM

## 2024-06-21 LAB
ABO + RH PNL BLD: NORMAL
BLD GP AB SCN SERPL QL: NORMAL

## 2024-06-21 PROCEDURE — P9040: CPT

## 2024-06-21 PROCEDURE — 36430 TRANSFUSION BLD/BLD COMPNT: CPT

## 2024-06-24 RX ORDER — DAROLUTAMIDE 300 MG/1
300 TABLET, FILM COATED ORAL
Qty: 120 | Refills: 0 | Status: ACTIVE | COMMUNITY
Start: 2024-05-30 | End: 1900-01-01

## 2024-06-26 ENCOUNTER — RESULT REVIEW (OUTPATIENT)
Age: 74
End: 2024-06-26

## 2024-06-26 ENCOUNTER — TRANSCRIPTION ENCOUNTER (OUTPATIENT)
Age: 74
End: 2024-06-26

## 2024-06-26 ENCOUNTER — OUTPATIENT (OUTPATIENT)
Dept: OUTPATIENT SERVICES | Facility: HOSPITAL | Age: 74
LOS: 1 days | Discharge: HOME | End: 2024-06-26
Payer: MEDICARE

## 2024-06-26 VITALS
TEMPERATURE: 98 F | DIASTOLIC BLOOD PRESSURE: 67 MMHG | SYSTOLIC BLOOD PRESSURE: 115 MMHG | HEART RATE: 88 BPM | RESPIRATION RATE: 18 BRPM | OXYGEN SATURATION: 96 %

## 2024-06-26 DIAGNOSIS — N21.0 CALCULUS IN BLADDER: Chronic | ICD-10-CM

## 2024-06-26 DIAGNOSIS — Z46.6 ENCOUNTER FOR FITTING AND ADJUSTMENT OF URINARY DEVICE: ICD-10-CM

## 2024-06-26 DIAGNOSIS — Z90.49 ACQUIRED ABSENCE OF OTHER SPECIFIED PARTS OF DIGESTIVE TRACT: Chronic | ICD-10-CM

## 2024-06-26 DIAGNOSIS — N13.5 CROSSING VESSEL AND STRICTURE OF URETER WITHOUT HYDRONEPHROSIS: ICD-10-CM

## 2024-06-26 DIAGNOSIS — C61 MALIGNANT NEOPLASM OF PROSTATE: ICD-10-CM

## 2024-06-26 PROCEDURE — 50435 EXCHANGE NEPHROSTOMY CATH: CPT | Mod: LT

## 2024-06-26 PROCEDURE — C1729: CPT

## 2024-06-26 PROCEDURE — C1769: CPT

## 2024-06-26 RX ORDER — CIPROFLOXACIN HCL 500 MG
400 TABLET ORAL ONCE
Refills: 0 | Status: DISCONTINUED | OUTPATIENT
Start: 2024-06-26 | End: 2024-06-26

## 2024-06-27 DIAGNOSIS — C61 MALIGNANT NEOPLASM OF PROSTATE: ICD-10-CM

## 2024-07-10 ENCOUNTER — RESULT REVIEW (OUTPATIENT)
Age: 74
End: 2024-07-10

## 2024-07-10 ENCOUNTER — OUTPATIENT (OUTPATIENT)
Dept: OUTPATIENT SERVICES | Facility: HOSPITAL | Age: 74
LOS: 1 days | End: 2024-07-10
Payer: MEDICARE

## 2024-07-10 ENCOUNTER — APPOINTMENT (OUTPATIENT)
Age: 74
End: 2024-07-10

## 2024-07-10 ENCOUNTER — LABORATORY RESULT (OUTPATIENT)
Age: 74
End: 2024-07-10

## 2024-07-10 DIAGNOSIS — C61 MALIGNANT NEOPLASM OF PROSTATE: ICD-10-CM

## 2024-07-10 DIAGNOSIS — D64.9 ANEMIA, UNSPECIFIED: ICD-10-CM

## 2024-07-10 DIAGNOSIS — Z90.49 ACQUIRED ABSENCE OF OTHER SPECIFIED PARTS OF DIGESTIVE TRACT: Chronic | ICD-10-CM

## 2024-07-10 DIAGNOSIS — N21.0 CALCULUS IN BLADDER: Chronic | ICD-10-CM

## 2024-07-10 LAB
ALP BLD-CCNC: 625 U/L
ALT SERPL-CCNC: 21 U/L
ANION GAP SERPL CALC-SCNC: 10 MMOL/L
AST SERPL-CCNC: 20 U/L
BILIRUB DIRECT SERPL-MCNC: <0.2 MG/DL
BILIRUB INDIRECT SERPL-MCNC: >0.3 MG/DL
BILIRUB SERPL-MCNC: 0.5 MG/DL
BUN SERPL-MCNC: 29 MG/DL
CHLORIDE SERPL-SCNC: 110 MMOL/L
CO2 SERPL-SCNC: 20 MMOL/L
CREAT SERPL-MCNC: 1.2 MG/DL
EGFR: 64 ML/MIN/1.73M2
GLUCOSE BLDC GLUCOMTR-MCNC: 97 MG/DL — SIGNIFICANT CHANGE UP (ref 70–99)
GLUCOSE SERPL-MCNC: 99 MG/DL
HGB BLD-MCNC: 9.7 G/DL
MCHC RBC-ENTMCNC: 29 PG
MCHC RBC-ENTMCNC: 32 G/DL
MCV RBC AUTO: 90.7 FL
PLATELET # BLD AUTO: 253 K/UL
PMV BLD: 9.2 FL
POTASSIUM SERPL-SCNC: 4.9 MMOL/L
PROT SERPL-MCNC: 5.6 G/DL
RBC # BLD: 3.34 M/UL
RBC # FLD: 18.7 %
SODIUM SERPL-SCNC: 140 MMOL/L
WBC # FLD AUTO: 11.35 K/UL

## 2024-07-10 PROCEDURE — 85027 COMPLETE CBC AUTOMATED: CPT

## 2024-07-10 PROCEDURE — 78816 PET IMAGE W/CT FULL BODY: CPT | Mod: PS

## 2024-07-10 PROCEDURE — A9595: CPT

## 2024-07-10 PROCEDURE — 36415 COLL VENOUS BLD VENIPUNCTURE: CPT

## 2024-07-10 PROCEDURE — 82962 GLUCOSE BLOOD TEST: CPT

## 2024-07-10 PROCEDURE — 78816 PET IMAGE W/CT FULL BODY: CPT | Mod: 26

## 2024-07-10 PROCEDURE — 80048 BASIC METABOLIC PNL TOTAL CA: CPT

## 2024-07-10 PROCEDURE — 80076 HEPATIC FUNCTION PANEL: CPT

## 2024-07-11 ENCOUNTER — APPOINTMENT (OUTPATIENT)
Age: 74
End: 2024-07-11
Payer: MEDICARE

## 2024-07-11 ENCOUNTER — OUTPATIENT (OUTPATIENT)
Dept: OUTPATIENT SERVICES | Facility: HOSPITAL | Age: 74
LOS: 1 days | End: 2024-07-11
Payer: MEDICARE

## 2024-07-11 ENCOUNTER — APPOINTMENT (OUTPATIENT)
Age: 74
End: 2024-07-11

## 2024-07-11 VITALS
HEIGHT: 66 IN | TEMPERATURE: 98.8 F | BODY MASS INDEX: 23.95 KG/M2 | HEART RATE: 98 BPM | RESPIRATION RATE: 16 BRPM | SYSTOLIC BLOOD PRESSURE: 116 MMHG | DIASTOLIC BLOOD PRESSURE: 68 MMHG | OXYGEN SATURATION: 99 % | WEIGHT: 149 LBS

## 2024-07-11 VITALS — SYSTOLIC BLOOD PRESSURE: 116 MMHG | TEMPERATURE: 99 F | HEART RATE: 98 BPM | DIASTOLIC BLOOD PRESSURE: 68 MMHG

## 2024-07-11 DIAGNOSIS — D64.9 ANEMIA, UNSPECIFIED: ICD-10-CM

## 2024-07-11 DIAGNOSIS — N21.0 CALCULUS IN BLADDER: Chronic | ICD-10-CM

## 2024-07-11 DIAGNOSIS — C61 MALIGNANT NEOPLASM OF PROSTATE: ICD-10-CM

## 2024-07-11 DIAGNOSIS — C79.51 MALIGNANT NEOPLASM OF PROSTATE: ICD-10-CM

## 2024-07-11 DIAGNOSIS — D72.829 ELEVATED WHITE BLOOD CELL COUNT, UNSPECIFIED: ICD-10-CM

## 2024-07-11 DIAGNOSIS — Z51.81 ENCOUNTER FOR THERAPEUTIC DRUG LVL MONITORING: ICD-10-CM

## 2024-07-11 DIAGNOSIS — Z90.49 ACQUIRED ABSENCE OF OTHER SPECIFIED PARTS OF DIGESTIVE TRACT: Chronic | ICD-10-CM

## 2024-07-11 PROCEDURE — 99215 OFFICE O/P EST HI 40 MIN: CPT

## 2024-07-11 PROCEDURE — 96402 CHEMO HORMON ANTINEOPL SQ/IM: CPT

## 2024-07-11 PROCEDURE — 96375 TX/PRO/DX INJ NEW DRUG ADDON: CPT

## 2024-07-11 PROCEDURE — G2211 COMPLEX E/M VISIT ADD ON: CPT

## 2024-07-11 PROCEDURE — 96413 CHEMO IV INFUSION 1 HR: CPT

## 2024-07-11 PROCEDURE — 96367 TX/PROPH/DG ADDL SEQ IV INF: CPT

## 2024-07-11 PROCEDURE — 84153 ASSAY OF PSA TOTAL: CPT

## 2024-07-11 RX ORDER — DOCETAXEL 10 MG/ML
130 INJECTION, SOLUTION INTRAVENOUS ONCE
Refills: 0 | Status: COMPLETED | OUTPATIENT
Start: 2024-07-11 | End: 2024-07-11

## 2024-07-11 RX ORDER — ZOLEDRONIC ACID 4 MG/5ML
3 INJECTION, SOLUTION, CONCENTRATE INTRAVENOUS ONCE
Refills: 0 | Status: COMPLETED | OUTPATIENT
Start: 2024-07-11 | End: 2024-07-11

## 2024-07-11 RX ORDER — DIPHENHYDRAMINE HCL 12.5MG/5ML
50 LIQUID (ML) ORAL ONCE
Refills: 0 | Status: COMPLETED | OUTPATIENT
Start: 2024-07-11 | End: 2024-07-11

## 2024-07-11 RX ORDER — LEUPROLIDE ACETATE 45 MG
22.5 KIT SUBCUTANEOUS ONCE
Refills: 0 | Status: COMPLETED | OUTPATIENT
Start: 2024-07-11 | End: 2024-07-11

## 2024-07-11 RX ORDER — ONDANSETRON HCL/PF 4 MG/2 ML
16 VIAL (ML) INJECTION ONCE
Refills: 0 | Status: COMPLETED | OUTPATIENT
Start: 2024-07-11 | End: 2024-07-11

## 2024-07-11 RX ORDER — FAMOTIDINE 40 MG
20 TABLET ORAL ONCE
Refills: 0 | Status: COMPLETED | OUTPATIENT
Start: 2024-07-11 | End: 2024-07-11

## 2024-07-11 RX ADMIN — Medication 110 MILLIGRAM(S): at 12:36

## 2024-07-11 RX ADMIN — LEUPROLIDE ACETATE 22.5 MILLIGRAM(S): KIT SUBCUTANEOUS at 12:13

## 2024-07-11 RX ADMIN — Medication 16 MILLIGRAM(S): at 12:36

## 2024-07-11 RX ADMIN — ZOLEDRONIC ACID 3 MILLIGRAM(S): 4 INJECTION, SOLUTION, CONCENTRATE INTRAVENOUS at 13:45

## 2024-07-11 RX ADMIN — Medication 116 MILLIGRAM(S): at 12:18

## 2024-07-11 RX ADMIN — DOCETAXEL 130 MILLIGRAM(S): 10 INJECTION, SOLUTION INTRAVENOUS at 14:50

## 2024-07-11 RX ADMIN — DOCETAXEL 130 MILLIGRAM(S): 10 INJECTION, SOLUTION INTRAVENOUS at 13:45

## 2024-07-11 RX ADMIN — Medication 102 MILLIGRAM(S): at 12:51

## 2024-07-11 RX ADMIN — Medication 20 MILLIGRAM(S): at 12:19

## 2024-07-11 RX ADMIN — Medication 20 MILLIGRAM(S): at 12:51

## 2024-07-11 RX ADMIN — Medication 50 MILLIGRAM(S): at 13:11

## 2024-07-11 RX ADMIN — ZOLEDRONIC ACID 3 MILLIGRAM(S): 4 INJECTION, SOLUTION, CONCENTRATE INTRAVENOUS at 13:15

## 2024-07-12 LAB — PSA SERPL-MCNC: 0.17 NG/ML

## 2024-07-16 RX ORDER — TAMSULOSIN HYDROCHLORIDE 0.4 MG/1
0.4 CAPSULE ORAL
Qty: 30 | Refills: 3 | Status: ACTIVE | COMMUNITY
Start: 2024-07-16 | End: 1900-01-01

## 2024-07-17 ENCOUNTER — RESULT REVIEW (OUTPATIENT)
Age: 74
End: 2024-07-17

## 2024-07-17 ENCOUNTER — OUTPATIENT (OUTPATIENT)
Dept: OUTPATIENT SERVICES | Facility: HOSPITAL | Age: 74
LOS: 1 days | End: 2024-07-17
Payer: MEDICARE

## 2024-07-17 DIAGNOSIS — C61 MALIGNANT NEOPLASM OF PROSTATE: ICD-10-CM

## 2024-07-17 DIAGNOSIS — Z90.49 ACQUIRED ABSENCE OF OTHER SPECIFIED PARTS OF DIGESTIVE TRACT: Chronic | ICD-10-CM

## 2024-07-17 DIAGNOSIS — R60.0 LOCALIZED EDEMA: ICD-10-CM

## 2024-07-17 PROCEDURE — 93970 EXTREMITY STUDY: CPT

## 2024-07-17 PROCEDURE — 93970 EXTREMITY STUDY: CPT | Mod: 26

## 2024-07-18 DIAGNOSIS — C61 MALIGNANT NEOPLASM OF PROSTATE: ICD-10-CM

## 2024-07-18 DIAGNOSIS — R60.0 LOCALIZED EDEMA: ICD-10-CM

## 2024-07-31 ENCOUNTER — APPOINTMENT (OUTPATIENT)
Age: 74
End: 2024-07-31

## 2024-07-31 ENCOUNTER — LABORATORY RESULT (OUTPATIENT)
Age: 74
End: 2024-07-31

## 2024-07-31 ENCOUNTER — OUTPATIENT (OUTPATIENT)
Dept: OUTPATIENT SERVICES | Facility: HOSPITAL | Age: 74
LOS: 1 days | End: 2024-07-31
Payer: MEDICARE

## 2024-07-31 DIAGNOSIS — D64.9 ANEMIA, UNSPECIFIED: ICD-10-CM

## 2024-07-31 DIAGNOSIS — C61 MALIGNANT NEOPLASM OF PROSTATE: ICD-10-CM

## 2024-07-31 DIAGNOSIS — Z90.49 ACQUIRED ABSENCE OF OTHER SPECIFIED PARTS OF DIGESTIVE TRACT: Chronic | ICD-10-CM

## 2024-07-31 DIAGNOSIS — N21.0 CALCULUS IN BLADDER: Chronic | ICD-10-CM

## 2024-07-31 LAB
ALBUMIN SERPL ELPH-MCNC: 3.8 G/DL
ALP BLD-CCNC: 510 U/L
ALT SERPL-CCNC: 19 U/L
ANION GAP SERPL CALC-SCNC: 13 MMOL/L
AST SERPL-CCNC: 17 U/L
BILIRUB DIRECT SERPL-MCNC: <0.2 MG/DL
BILIRUB INDIRECT SERPL-MCNC: >0.1 MG/DL
BILIRUB SERPL-MCNC: 0.3 MG/DL
BUN SERPL-MCNC: 29 MG/DL
CALCIUM SERPL-MCNC: 7.7 MG/DL
CHLORIDE SERPL-SCNC: 108 MMOL/L
CO2 SERPL-SCNC: 19 MMOL/L
CREAT SERPL-MCNC: 1.4 MG/DL
EGFR: 53 ML/MIN/1.73M2
GLUCOSE SERPL-MCNC: 119 MG/DL
HCT VFR BLD CALC: 27.7 %
HGB BLD-MCNC: 8.8 G/DL
MCHC RBC-ENTMCNC: 29.2 PG
MCHC RBC-ENTMCNC: 31.8 G/DL
MCV RBC AUTO: 92 FL
PLATELET # BLD AUTO: 260 K/UL
PMV BLD: 9.1 FL
POTASSIUM SERPL-SCNC: 4.8 MMOL/L
PROT SERPL-MCNC: 5.4 G/DL
RBC # BLD: 3.01 M/UL
RBC # FLD: 19.5 %
SODIUM SERPL-SCNC: 140 MMOL/L
WBC # FLD AUTO: 12.11 K/UL

## 2024-07-31 PROCEDURE — 80076 HEPATIC FUNCTION PANEL: CPT

## 2024-07-31 PROCEDURE — 84153 ASSAY OF PSA TOTAL: CPT

## 2024-07-31 PROCEDURE — 36415 COLL VENOUS BLD VENIPUNCTURE: CPT

## 2024-07-31 PROCEDURE — 85027 COMPLETE CBC AUTOMATED: CPT

## 2024-07-31 PROCEDURE — 80048 BASIC METABOLIC PNL TOTAL CA: CPT

## 2024-08-01 ENCOUNTER — OUTPATIENT (OUTPATIENT)
Dept: OUTPATIENT SERVICES | Facility: HOSPITAL | Age: 74
LOS: 1 days | End: 2024-08-01
Payer: MEDICARE

## 2024-08-01 ENCOUNTER — APPOINTMENT (OUTPATIENT)
Age: 74
End: 2024-08-01
Payer: MEDICARE

## 2024-08-01 VITALS
TEMPERATURE: 98 F | HEART RATE: 101 BPM | SYSTOLIC BLOOD PRESSURE: 110 MMHG | RESPIRATION RATE: 16 BRPM | DIASTOLIC BLOOD PRESSURE: 63 MMHG

## 2024-08-01 VITALS
TEMPERATURE: 97.8 F | DIASTOLIC BLOOD PRESSURE: 63 MMHG | HEIGHT: 66 IN | HEART RATE: 101 BPM | OXYGEN SATURATION: 99 % | BODY MASS INDEX: 24.75 KG/M2 | RESPIRATION RATE: 16 BRPM | SYSTOLIC BLOOD PRESSURE: 110 MMHG | WEIGHT: 154 LBS

## 2024-08-01 DIAGNOSIS — C79.51 MALIGNANT NEOPLASM OF PROSTATE: ICD-10-CM

## 2024-08-01 DIAGNOSIS — Z51.81 ENCOUNTER FOR THERAPEUTIC DRUG LVL MONITORING: ICD-10-CM

## 2024-08-01 DIAGNOSIS — C61 MALIGNANT NEOPLASM OF PROSTATE: ICD-10-CM

## 2024-08-01 DIAGNOSIS — D72.829 ELEVATED WHITE BLOOD CELL COUNT, UNSPECIFIED: ICD-10-CM

## 2024-08-01 DIAGNOSIS — D64.9 ANEMIA, UNSPECIFIED: ICD-10-CM

## 2024-08-01 LAB — PSA SERPL-MCNC: 0.13 NG/ML

## 2024-08-01 PROCEDURE — 96413 CHEMO IV INFUSION 1 HR: CPT

## 2024-08-01 PROCEDURE — 99214 OFFICE O/P EST MOD 30 MIN: CPT

## 2024-08-01 PROCEDURE — 96375 TX/PRO/DX INJ NEW DRUG ADDON: CPT

## 2024-08-01 PROCEDURE — 96367 TX/PROPH/DG ADDL SEQ IV INF: CPT

## 2024-08-01 RX ORDER — DIPHENHYDRAMINE HCL 12.5MG/5ML
50 LIQUID (ML) ORAL ONCE
Refills: 0 | Status: COMPLETED | OUTPATIENT
Start: 2024-08-01 | End: 2024-08-01

## 2024-08-01 RX ORDER — DOCETAXEL 10 MG/ML
130 INJECTION, SOLUTION INTRAVENOUS ONCE
Refills: 0 | Status: COMPLETED | OUTPATIENT
Start: 2024-08-01 | End: 2024-08-01

## 2024-08-01 RX ORDER — FAMOTIDINE 40 MG
20 TABLET ORAL ONCE
Refills: 0 | Status: COMPLETED | OUTPATIENT
Start: 2024-08-01 | End: 2024-08-01

## 2024-08-01 RX ORDER — ZOLEDRONIC ACID 4 MG/5ML
3 INJECTION, SOLUTION, CONCENTRATE INTRAVENOUS ONCE
Refills: 0 | Status: COMPLETED | OUTPATIENT
Start: 2024-08-01 | End: 2024-08-01

## 2024-08-01 RX ORDER — ONDANSETRON HCL/PF 4 MG/2 ML
16 VIAL (ML) INJECTION ONCE
Refills: 0 | Status: COMPLETED | OUTPATIENT
Start: 2024-08-01 | End: 2024-08-01

## 2024-08-01 RX ADMIN — Medication 102 MILLIGRAM(S): at 13:11

## 2024-08-01 RX ADMIN — Medication 104 MILLIGRAM(S): at 13:10

## 2024-08-01 RX ADMIN — DOCETAXEL 130 MILLIGRAM(S): 10 INJECTION, SOLUTION INTRAVENOUS at 15:15

## 2024-08-01 RX ADMIN — Medication 50 MILLIGRAM(S): at 14:10

## 2024-08-01 RX ADMIN — Medication 4 MILLIGRAM(S): at 13:10

## 2024-08-01 RX ADMIN — ZOLEDRONIC ACID 3 MILLIGRAM(S): 4 INJECTION, SOLUTION, CONCENTRATE INTRAVENOUS at 12:59

## 2024-08-01 RX ADMIN — Medication 16 MILLIGRAM(S): at 13:45

## 2024-08-01 RX ADMIN — DOCETAXEL 130 MILLIGRAM(S): 10 INJECTION, SOLUTION INTRAVENOUS at 13:15

## 2024-08-01 RX ADMIN — Medication 116 MILLIGRAM(S): at 13:11

## 2024-08-01 RX ADMIN — ZOLEDRONIC ACID 3 MILLIGRAM(S): 4 INJECTION, SOLUTION, CONCENTRATE INTRAVENOUS at 13:30

## 2024-08-01 RX ADMIN — Medication 20 MILLIGRAM(S): at 13:30

## 2024-08-01 NOTE — HISTORY OF PRESENT ILLNESS
[Therapy: ___] : Therapy: [unfilled] [Cycle: ___] : Cycle: [unfilled] [de-identified] : 72yo Male with pmh of BPH (on finasteride), HTN, HLD presented to the ER with lower abdominal pain and constipation for the past few days. The patient follows with Urologist Dr. Perez and private Oncologist in [place]. The patient mentioned that he is not aware of the prostate cancer diagnosis and was being managed for BPH by urology. He mentioned that he had a bladder stone removal in 2021 and had colonoscopy recently in Feb which showed 5 polyps. The patient has increase in urinary frequency and nocturia but denies any dysuria and attributes it to Finasteride. He has FHx ( 2 brothers) of prostate cancer. The patient mentioned that he would like to see his private oncologist rather than get treatment with oncology team here. The patient has been making urine and [place] was in ED as requested by urology and has mild hematuria (likely due to traumatic H[place]).  CT A/P 3/8/24: KIDNEYS/PELVIC ORGANS: Left lateral urinary bladder wall masslike thickening, contiguous with prostate, with extravesicular tumor infiltration. Mass at region of left UVJ, with resultant moderate left hydroureteronephrosis. Urinary bladder distended. BPH with median lobe hypertrophy. Three urinary bladder intraluminal calculi, measuring up to 0.5 cm. Relation of prostate gland and rectum limited by modality. Right mild hydronephrosis or fullness. ABDOMINOPELVIC NODES: Suspicious pelvic lymphadenopathy, including left medial external iliac chain 2 x 1.6 cm lymph node, right internal iliac chain 1.9 x 1.2 cm lymph node, multiple presacral lymph nodes measuring up to 0.9 cm short axis, and perirectal lymph nodes measuring up to 1.5 x 1 cm.  PERITONEUM/MESENTERY/BOWEL: Post appendectomy. No bowel obstruction. No ascites. Colonic diverticulosis. Mild colonic stool burden..  BONES/SOFT TISSUES: Patchy faint sclerosis throughout visualized spine, bony pelvis and bilateral ribs, suspicious for osteoblastic metastatic bone disease.  OTHER: Vascular calcifications.   IMPRESSION:  1. Left lateral urinary bladder wall masslike thickening, contiguous with prostate, with extravesicular tumor infiltration. Mass at region of left UVJ, with resultant moderate left hydroureteronephrosis. Urinary bladder distended. Right mild hydronephrosis or fullness. BPH with median lobe hypertrophy. 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 Findings compatible with metastatic neoplasm; prostate cancer invading bladder favored over primary urinary bladder neoplasm given suspicious osteoblastic metastatic bone disease.. Correlation with PSA recommended.  2. Suspicious pelvic lymphadenopathy, catalogued above.  3. Mild colonic stool burden. No bowel obstruction. Relation of prostate gland and rectum limited by modality.     BONE SCAN 3/11/24 Extensive multifocal increased activity throughout the bones, compatible with diffuse osseous metastatic disease. There is multifocal increased activity in the bilateral ribs, right humeral head, left mid humerus, pelvis, bilateral femurs, thoracolumbar spine, and calvarium.  [FreeTextEntry1] : Started Taxotere on 4.18.2024 - 8.1.2024  [de-identified] : 4/8/24 Patient is here for a follow-up visit for metastatic prostate cancer, accompanied by son.  He is feeling well with no new complaints.  Reviewed pathology from recent left iliac bone biopsy which was diagnostic for metastatic carcinoma of the prostate.  He recently started Casodex 50mg once daily on 4.1.2024 as per recommendations from Urology.  Patient denies fever, chills, nausea, vomiting or dyspnea.  He reports hematuria.     5/9/24 Patient is here for a follow-up visit for metastatic prostate cancer, accompanied by spouse.  Patient started Lupron 22.5mg every 3 months on 4.18.2024.  Reviewed most recent CBC which shows moderate leukocytosis and anemia with hgb 9.1g/dL.  Most recent PSA was 6.16ng/mL from 04/2024.  He continues on Casodex 50mg once daily.  He has not had any more episodes of hematuria.   He is due for cycle 2 of Taxotere on 5/9, initiated 4.18.2024.  He reports dysgeusia and fatigue.  Patient denies fever, chills, nausea, vomiting or dyspnea.    5/30/24 Patient is here for a follow-up visit for metastatic prostate cancer, accompanied by spouse.  Patient started Lupron 22.5mg every 3 months on 4.18.2024.  Reviewed most recent CBC which shows moderate leukocytosis and anemia with hgb 9.1g/dL.  Most recent PSA was 6.16ng/mL from 04/2024.  He continues on Casodex 50mg once daily.  He has not had any more episodes of hematuria.   He is due for cycle 3 of Taxotere on 5/30, initiated 4.18.2024.  He reports dysgeusia and fatigue which has affected his appetite and results in weight loss since last visit.  Patient denies fever, chills, nausea, vomiting or dyspnea.    6/20/24 Patient is here for a follow-up visit for metastatic prostate cancer, accompanied by spouse.  Since last visit, patient started Nubeqa (Darolutamide) twice daily in 06/2024.  Patient started Lupron 22.5mg every 3 months on 4.18.2024.  Reviewed most recent CBC which shows severe anemia with hgb 7.8g/dL.  Most recent PSA was 0.38ng/mL from 06/2024.  He continues on Casodex 50mg once daily.  He has not had any more episodes of hematuria.   He is due for cycle 4 of Taxotere on 6/20, initiated 4.18.2024.  He states his energy has been good this past week but occasionally he becomes fatigued.  Patient denies fever, chills, dizziness, chest pain, palpitations, nausea, vomiting or dyspnea.    7/11/24 Patient is here for a follow-up visit for metastatic prostate cancer, accompanied by spouse.  Patient continues Nubeqa (Darolutamide) twice daily, initiated in 06/2024.  Patient started Lupron 22.5mg every 3 months on 4.18.2024.  Reviewed most recent CBC which shows severe anemia with hgb 9.7/dL.  Most recent PSA was 0.38ng/mL from 06/2024.  He continues on Casodex 50mg once daily.  He is due for cycle 5 of Taxotere on 7/11, initiated 4.18.2024.  Patient denies fever, chills, dizziness, chest pain, palpitations, nausea, vomiting or dyspnea.  Reviewed most recent PET/CT which redemonstrates known PSMA avid osseous lesions, and decreased in size of iliac chain lymph nodes.   PET/CT PSMA  (7.10.2024) IMPRESSION:Innumerable 2+ and 3+ PSMA avid osseous lesions throughout the skeleton and the prostate gland consistent with known metastatic malignancy.A total of three 2+ and 3+ right iliac chain lymph nodes are decreased in size since CT abdomen pelvis on 3/8/2024 (previously measuring up to 1.4 cm, currently subcentimeter on series 3 image 131 along the right inner pelvic sidewall and series 3 image 155).  8/1/24 Patient is here for a follow-up visit for metastatic prostate cancer, accompanied by spouse.  He is due for cycle 6 of Taxotere on 8/1, initiated 4.18.2024.  Patient continues Nubeqa (Darolutamide) twice daily, initiated in 06/2024.  He continues on Casodex 50mg once daily.  Patient started Lupron 22.5mg every 3 months on 4.18.2024.  Reviewed most recent CBC which shows moderate anemia with hgb 8.8g/dL.  Most recent PSA was 0.13ng/mL from 07/2024.  Patient denies fever, chills, dizziness, chest pain, palpitations, nausea, vomiting or dyspnea.

## 2024-08-01 NOTE — ASSESSMENT
[FreeTextEntry1] : 74 YO man, non smoker, was evaluated inpatient for abdomen pain and found to have pelvic mass (see CT scan below), with lymphadenopathy and bone masses.   # Metastatic Prostate Cancer, Stage IV, prognostic grade group 5, [place] score 10 (5+5), dx 03/2024  - s/p left iliac bone biopsy with IR on 3.15.2024 which was diagnostic for metastatic carcinoma of the prostate - s/p TURP 3/29/24  - Perineural invasion is identified. - Rare focus suspicious for lymphovascular invasion. - reviewed radiology, pathology and lab workup and had a discussion regarding implications of diagnosis, prognosis and options for management including but not limited to chemotherapy + ADT  - STOPPED Casodex 50mg once daily, initiated 4.1.2024 - 05/2024  - PET PSMA done following 4 cycles of chemotherapy 7.10.2024 redemonstrates known PSMA avid osseous lesions, and decreased in size of iliac chain lymph nodes.   - c/w Lupron 22.5mg every 12 weeks on 7/11, initiated 4.18.2024, next due 10/2024  - c/w cycle 6 out of 6 of Taxotere on 8/1, initiated 4.18.2024  - c/w pre-medication of Taxotere 8mg BiD starting day for 3 days starting 1 day prior to TAXOTERE administration in order to reduce the incidence and severity of fluid retention as well as the severity of hypersensitivity reactions - c/w Zometa IV every 3 weeks to preserve bone strength and prevent fracture; risks vs benefits discussed, dental clearance obtained.  He will continue Calcium as recommended.  - c/w Darolutamide 600mg every 12 hours , initiated 06/2024  - recommend to repeat PET/CT PSMA in 3 months (~10/2024) or sooner based on symptomology - followup with URO, Dr. 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 Skaradinskiy with CBC, BMP, LFTs, PSA level 2 days prior   seen/ examined w/ NP Vanessa; note reviewed; case discussed; agree w/ plan  continue ADT continue taxotere, proceed w/ c6;  had long discsussion with radiology regarding PET scan results": there is clearly dramatic bone involvement; can not state if disease progressed or not since this is the first PET; he is not symptomatic; will continue the same therapy; will repeat PET in 3 months, (09/2024 add Darolutamide beginning 5/30/2024 get PSA

## 2024-08-01 NOTE — PHYSICAL EXAM
[Restricted in physically strenuous activity but ambulatory and able to carry out work of a light or sedentary nature] : Status 1- Restricted in physically strenuous activity but ambulatory and able to carry out work of a light or sedentary nature, e.g., light house work, office work [Normal] : affect appropriate [de-identified] : +1 bilateral ankle edema

## 2024-08-01 NOTE — REVIEW OF SYSTEMS
[Fatigue] : fatigue [Recent Change In Weight] : ~T recent weight change [Lower Ext Edema] : lower extremity edema [Constipation] : constipation [Diarrhea: Grade 0] : Diarrhea: Grade 0 [Negative] : Allergic/Immunologic [Fever] : no fever [Chills] : no chills [Night Sweats] : no night sweats [Eye Pain] : no eye pain [Dysphagia] : no dysphagia [Odynophagia] : no odynophagia [Chest Pain] : no chest pain [Palpitations] : no palpitations [Shortness Of Breath] : no shortness of breath [Abdominal Pain] : no abdominal pain [Dysuria] : no dysuria [Incontinence] : no incontinence [Joint Pain] : no joint pain [Skin Rash] : no skin rash [Confused] : no confusion [Suicidal] : not suicidal [FreeTextEntry2] : reports dysgeusia, weight loss due to poor appetite  [FreeTextEntry8] : urine bag draining clear yellow  [de-identified] : reports mild change in sensation of feet starting around Cycle 3

## 2024-08-02 DIAGNOSIS — C61 MALIGNANT NEOPLASM OF PROSTATE: ICD-10-CM

## 2024-08-13 ENCOUNTER — APPOINTMENT (OUTPATIENT)
Dept: UROLOGY | Facility: CLINIC | Age: 74
End: 2024-08-13
Payer: MEDICARE

## 2024-08-13 VITALS
OXYGEN SATURATION: 100 % | SYSTOLIC BLOOD PRESSURE: 112 MMHG | RESPIRATION RATE: 17 BRPM | DIASTOLIC BLOOD PRESSURE: 66 MMHG | TEMPERATURE: 98 F | HEART RATE: 95 BPM | BODY MASS INDEX: 24.75 KG/M2 | HEIGHT: 66 IN | WEIGHT: 154 LBS

## 2024-08-13 DIAGNOSIS — N40.1 BENIGN PROSTATIC HYPERPLASIA WITH LOWER URINARY TRACT SYMPMS: ICD-10-CM

## 2024-08-13 DIAGNOSIS — N13.30 UNSPECIFIED HYDRONEPHROSIS: ICD-10-CM

## 2024-08-13 DIAGNOSIS — N13.8 BENIGN PROSTATIC HYPERPLASIA WITH LOWER URINARY TRACT SYMPMS: ICD-10-CM

## 2024-08-13 PROCEDURE — G2211 COMPLEX E/M VISIT ADD ON: CPT

## 2024-08-13 PROCEDURE — 99214 OFFICE O/P EST MOD 30 MIN: CPT

## 2024-08-13 NOTE — HISTORY OF PRESENT ILLNESS
[FreeTextEntry1] : CHARLY SULLIVAN is a 73-year-old male with hx of HTN, HLD, BPH on finasteride since ~2014, presented to [facility] with 1 liter urinary retention and obstructing left bladder wall vs prostate mass causing left hydronephrosis, JIGNESH, bone and pelvic LN possible metastases sp IR bone biopsy referred by Dr Aragon. KELVIN very suspicious for prostate cancer. sp TURP, bladder stone removal. significant BPH with median lobe, right lateral lobe BPH noted completely resected left lateral lobe partially resected and growing under left UO (bullous changes noted on bladder). prostatic urethra wide open at conclusion of procedure (03/29/2024), of note catheter hypospadias noted pathology Gl 5+5 PCa. Bone biopsy pathology confirms metastatic prostate cancer. Currently seeing oncology and is on ADT and Taxotere, initiated 04/2024.   Pt reports good flow and is voiding normally. He is on flomax. He had second opinion on [facility] and was started on Nubeqa (darolutamide).   Denies flank pain, gross hematuria, dysuria or associated symptoms.   PVR today 0 cc.  IR nephrostogram images reviewed 06/2024 - successful left PCN exchange. there is obstruction of the distal left ureter.    PSA 07/31/2024 - 0.13  PSMA PET CT 07/10/2024  IMPRESSION: Innumerable 2+ and 3+ PSMA avid osseous lesions throughout the skeleton and the prostate gland consistent with known metastatic malignancy. A total of three 2+ and 3+ right iliac chain lymph nodes are decreased in size since CT abdomen pelvis on 3/8/2024 (previously measuring up to 1.4 cm, currently subcentimeter on series 3 image 131 along the right inner pelvic sidewall and series 3 image 155).  previously Labs 05/09/2024 Cr 1.2 GFR 64 Ca 8.5 K 5.1  PVR 80cc  previously, Doing well no issues since turp, no f/c. took abx  Pathology 3/15/2024 - LEFT ILIAC BONE LESION, CT GUIDED-BIOPSY AND IMPRINTS: - POSITIVE FOR MALIGNANT CELLS. - Metastatic carcinoma of the prostate (see comment).  previously NM Bone Scan 03/11/2024 - images independently reviewed by me IMPRESSION: Extensive multifocal increased activity throughout the bones, compatible with diffuse osseous metastatic disease as described above.  RBUS 03/12/2024 - IMPRESSION: Mild left-sided hydronephrosis without change. Enlarged prostate. Hensley catheter within the urinary bladder with residual urine within. (Right kidney: 1.6 cm parenchymal cyst. Urinary bladder: Hensley catheter is seen within the urinary bladder. Urinary bladder has 300 cc of urine in it. The prostate is enlarged, measuring greater than 110 cc.)  CT AP w/ IV Cont 03/08/2024 - images independently reviewed by me -agree with findings there is large tumor in the vicinity of the bladder neck/prostate extending towards the left trigone IMPRESSION: Left lateral urinary bladder wall masslike thickening, contiguous with prostate, with extravesicular tumor infiltration. Mass at region of left UVJ, with resultant moderate left hydroureteronephrosis. Urinary bladder distended. Right mild hydronephrosis or fullness. BPH with median lobe hypertrophy. 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 Findings compatible with metastatic neoplasm; prostate cancer invading bladder favored over primary urinary bladder neoplasm given suspicious osteoblastic metastatic bone disease.. Correlation with PSA recommended.  Suspicious pelvic lymphadenopathy catalogued above. Mild colonic stool burden. No bowel obstruction. Relation of prostate gland and rectum limited by modality.  Labs 03/17/2024 Cr 1.8  GFR 40  Ca 8.1  K 3.8 UA - proteinuria, 654 RBCs, 35 WBCs, bacteriuria. Microhematuria.  UCx - >100,000 CFU/ml Enterococcus faecalis x2 Blood Cx - no growth    history: Denies previous kidney stones, recurrent UTIs. Prior urologist in  with hx of bladder stones sp cystoscopy removal  Family History: brother had PCa.  Social History: works for [employer], Non-smoker, niece is a nurse, has two adopted children,, daughter passed away from leukemia 2022,  currently is in custody of 3 grandchildren; son lives in [place],   Dr. Aragon notes 1. Dr Perez today -- pt's urologist in [place]  PSA -- was about 2.7 in July 2023 at that same time also had CT scan which had no signs of metastatic disease.  PSA was drawn which was in the 9s. (PSA was 2.7 in July 2023 per his urologist) On March 15th he underwent biopsy of bone lesion by IR -- results still pending

## 2024-08-13 NOTE — ASSESSMENT
[FreeTextEntry1] : CHARLY SULLIVAN is a 73-year-old male with hx of HTN, HLD, BPH on finasteride since ~2014, presented to [facility] with 1 liter urinary retention and obstructing left bladder wall vs prostate mass causing left hydronephrosis, JIGNESH, bone and pelvic LN possible metastases sp IR bone biopsy referred by Dr Aragon. KELVIN very suspicious for prostate cancer. sp TURP, bladder stone removal. significant BPH with median lobe, right lateral lobe BPH noted completely resected left lateral lobe partially resected and growing under left UO (bullous changes noted on bladder). prostatic urethra wide open at conclusion of procedure (03/29/2024), of note catheter hypospadias noted pathology Gl 5+5 PCa. Bone biopsy pathology confirms metastatic prostate cancer. Currently seeing oncology and is on ADT and Taxotere, initiated 04/2024 and completed 8/2024. Started on darolutamide.  Next left PCN exchange 09/2024.   - cont Flomax for chronic BPH/ stable LUTS - cont f/u with onc  - f/u with me in 3 months.    we will have nephrostogram in the future to reassess the chronic hydronephrosis and obstruction.  Theoretically after treatment he may have resolution of the obstruction

## 2024-08-16 LAB
BILIRUB UR QL STRIP: NORMAL
COLLECTION METHOD: NORMAL
GLUCOSE UR-MCNC: NORMAL
HCG UR QL: 0.2 EU/DL
HGB UR QL STRIP.AUTO: NORMAL
KETONES UR-MCNC: NORMAL
LEUKOCYTE ESTERASE UR QL STRIP: NORMAL
NITRITE UR QL STRIP: NORMAL
PH UR STRIP: 5.5
PROT UR STRIP-MCNC: 30
SP GR UR STRIP: 1.03

## 2024-08-20 ENCOUNTER — LABORATORY RESULT (OUTPATIENT)
Age: 74
End: 2024-08-20

## 2024-08-20 ENCOUNTER — APPOINTMENT (OUTPATIENT)
Age: 74
End: 2024-08-20

## 2024-08-20 ENCOUNTER — OUTPATIENT (OUTPATIENT)
Dept: OUTPATIENT SERVICES | Facility: HOSPITAL | Age: 74
LOS: 1 days | End: 2024-08-20
Payer: MEDICARE

## 2024-08-20 DIAGNOSIS — C61 MALIGNANT NEOPLASM OF PROSTATE: ICD-10-CM

## 2024-08-20 DIAGNOSIS — Z90.49 ACQUIRED ABSENCE OF OTHER SPECIFIED PARTS OF DIGESTIVE TRACT: Chronic | ICD-10-CM

## 2024-08-20 DIAGNOSIS — N21.0 CALCULUS IN BLADDER: Chronic | ICD-10-CM

## 2024-08-20 LAB
ALBUMIN SERPL ELPH-MCNC: 4 G/DL
ALP BLD-CCNC: 445 U/L
ALT SERPL-CCNC: 20 U/L
ANION GAP SERPL CALC-SCNC: 13 MMOL/L
AST SERPL-CCNC: 21 U/L
BILIRUB DIRECT SERPL-MCNC: <0.2 MG/DL
BILIRUB INDIRECT SERPL-MCNC: >0.2 MG/DL
BILIRUB SERPL-MCNC: 0.4 MG/DL
BUN SERPL-MCNC: 28 MG/DL
CALCIUM SERPL-MCNC: 8.2 MG/DL
CHLORIDE SERPL-SCNC: 109 MMOL/L
CO2 SERPL-SCNC: 19 MMOL/L
CREAT SERPL-MCNC: 1.4 MG/DL
EGFR: 53 ML/MIN/1.73M2
GLUCOSE SERPL-MCNC: 102 MG/DL
HCT VFR BLD CALC: 28.7 %
HGB BLD-MCNC: 9.2 G/DL
IRON SATN MFR SERPL: 32 %
IRON SERPL-MCNC: 87 UG/DL
MCHC RBC-ENTMCNC: 29.6 PG
MCHC RBC-ENTMCNC: 32.1 G/DL
MCV RBC AUTO: 92.3 FL
PLATELET # BLD AUTO: 285 K/UL
PMV BLD: 8.9 FL
POTASSIUM SERPL-SCNC: 4.8 MMOL/L
PROT SERPL-MCNC: 6 G/DL
RBC # BLD: 3.11 M/UL
RBC # FLD: 19.3 %
SODIUM SERPL-SCNC: 141 MMOL/L
TIBC SERPL-MCNC: 272 UG/DL
UIBC SERPL-MCNC: 185 UG/DL
WBC # FLD AUTO: 11.66 K/UL

## 2024-08-20 PROCEDURE — 84153 ASSAY OF PSA TOTAL: CPT

## 2024-08-20 PROCEDURE — 36415 COLL VENOUS BLD VENIPUNCTURE: CPT

## 2024-08-20 PROCEDURE — 80076 HEPATIC FUNCTION PANEL: CPT

## 2024-08-20 PROCEDURE — 85027 COMPLETE CBC AUTOMATED: CPT

## 2024-08-20 PROCEDURE — 83540 ASSAY OF IRON: CPT

## 2024-08-20 PROCEDURE — 83550 IRON BINDING TEST: CPT

## 2024-08-20 PROCEDURE — 82728 ASSAY OF FERRITIN: CPT

## 2024-08-20 PROCEDURE — 80048 BASIC METABOLIC PNL TOTAL CA: CPT

## 2024-08-21 LAB
FERRITIN SERPL-MCNC: 1121 NG/ML
PSA SERPL-MCNC: 0.08 NG/ML

## 2024-08-22 ENCOUNTER — APPOINTMENT (OUTPATIENT)
Age: 74
End: 2024-08-22
Payer: MEDICARE

## 2024-08-22 ENCOUNTER — OUTPATIENT (OUTPATIENT)
Dept: OUTPATIENT SERVICES | Facility: HOSPITAL | Age: 74
LOS: 1 days | End: 2024-08-22
Payer: MEDICARE

## 2024-08-22 VITALS
WEIGHT: 155 LBS | HEIGHT: 66 IN | HEART RATE: 64 BPM | SYSTOLIC BLOOD PRESSURE: 116 MMHG | RESPIRATION RATE: 16 BRPM | DIASTOLIC BLOOD PRESSURE: 66 MMHG | TEMPERATURE: 98.6 F | BODY MASS INDEX: 24.91 KG/M2 | OXYGEN SATURATION: 99 %

## 2024-08-22 DIAGNOSIS — Z51.81 ENCOUNTER FOR THERAPEUTIC DRUG LVL MONITORING: ICD-10-CM

## 2024-08-22 DIAGNOSIS — C61 MALIGNANT NEOPLASM OF PROSTATE: ICD-10-CM

## 2024-08-22 DIAGNOSIS — C79.51 MALIGNANT NEOPLASM OF PROSTATE: ICD-10-CM

## 2024-08-22 DIAGNOSIS — N21.0 CALCULUS IN BLADDER: Chronic | ICD-10-CM

## 2024-08-22 DIAGNOSIS — D64.9 ANEMIA, UNSPECIFIED: ICD-10-CM

## 2024-08-22 DIAGNOSIS — D72.829 ELEVATED WHITE BLOOD CELL COUNT, UNSPECIFIED: ICD-10-CM

## 2024-08-22 DIAGNOSIS — Z90.49 ACQUIRED ABSENCE OF OTHER SPECIFIED PARTS OF DIGESTIVE TRACT: Chronic | ICD-10-CM

## 2024-08-22 PROCEDURE — 99214 OFFICE O/P EST MOD 30 MIN: CPT

## 2024-08-22 PROCEDURE — G2211 COMPLEX E/M VISIT ADD ON: CPT

## 2024-08-22 NOTE — BEGINNING OF VISIT
[0] : 2) Feeling down, depressed, or hopeless: Not at all (0) [PHQ-9 Negative] : PHQ-9 Negative [[id number]] : 0 [Pain Scale: ___] : On a scale of 1-10, today the patient's pain is a(n) [unfilled]. [Never] : Never [Date Discussed (MM/DD/YY): ___] : Discussed: [unfilled] [With Patient/Caregiver] : with Patient/Caregiver [Abdominal Pain] : no abdominal pain [Vomiting] : no vomiting [Constipation] : no constipation [Diarrhea Character] : Diarrhea: Grade 0

## 2024-08-22 NOTE — BEGINNING OF VISIT
[0] : 2) Feeling down, depressed, or hopeless: Not at all (0) [PHQ-9 Negative] : PHQ-9 Negative [[id number]] : 0 [Pain Scale: ___] : On a scale of 1-10, today the patient's pain is a(n) [unfilled]. [Never] : Never [Date Discussed (MM/DD/YY): ___] : Discussed: [unfilled] [With Patient/Caregiver] : with Patient/Caregiver [Abdominal Pain] : no abdominal pain [Vomiting] : no vomiting [Constipation] : no constipation [Diarrhea Character] : Diarrhea: Grade 0

## 2024-08-22 NOTE — BEGINNING OF VISIT
[0] : 2) Feeling down, depressed, or hopeless: Not at all (0) [PHQ-9 Negative] : PHQ-9 Negative [[id number]] : 0 [Pain Scale: ___] : On a scale of 1-10, today the patient's pain is a(n) [unfilled]. [Never] : Never [Date Discussed (MM/DD/YY): ___] : Discussed: [unfilled] [With Patient/Caregiver] : with Patient/Caregiver [Abdominal Pain] : no abdominal pain [Vomiting] : no vomiting [Constipation] : no constipation [Diarrhea Character] : Diarrhea: Grade 0

## 2024-08-22 NOTE — BEGINNING OF VISIT
[0] : 2) Feeling down, depressed, or hopeless: Not at all (0) [PHQ-9 Negative] : PHQ-9 Negative [[id number]] : 0 [Pain Scale: ___] : On a scale of 1-10, today the patient's pain is a(n) [unfilled]. [Never] : Never [Date Discussed (MM/DD/YY): ___] : Discussed: [unfilled] [With Patient/Caregiver] : with Patient/Caregiver [Abdominal Pain] : no abdominal pain [Vomiting] : no vomiting [Constipation] : no constipation [Diarrhea Character] : Diarrhea: Grade 0

## 2024-08-23 NOTE — PHYSICAL EXAM
[Restricted in physically strenuous activity but ambulatory and able to carry out work of a light or sedentary nature] : Status 1- Restricted in physically strenuous activity but ambulatory and able to carry out work of a light or sedentary nature, e.g., light house work, office work [Normal] : affect appropriate [de-identified] : trace bilateral ankle edema

## 2024-08-23 NOTE — REVIEW OF SYSTEMS
[Fatigue] : fatigue [Recent Change In Weight] : ~T recent weight change [Lower Ext Edema] : lower extremity edema [Constipation] : constipation [Diarrhea: Grade 0] : Diarrhea: Grade 0 [Negative] : Allergic/Immunologic [Fever] : no fever [Chills] : no chills [Night Sweats] : no night sweats [Eye Pain] : no eye pain [Dysphagia] : no dysphagia [Odynophagia] : no odynophagia [Chest Pain] : no chest pain [Palpitations] : no palpitations [Shortness Of Breath] : no shortness of breath [Abdominal Pain] : no abdominal pain [Dysuria] : no dysuria [Incontinence] : no incontinence [Joint Pain] : no joint pain [Skin Rash] : no skin rash [Confused] : no confusion [Suicidal] : not suicidal [FreeTextEntry2] : gained some weight [FreeTextEntry5] : occasionally experiences chest tightness when he walks, resolves with rest  [FreeTextEntry8] : urine bag draining clear yellow  [de-identified] : reports mild change in sensation of feet starting around Cycle 3

## 2024-08-23 NOTE — HISTORY OF PRESENT ILLNESS
[Therapy: ___] : Therapy: [unfilled] [Cycle: ___] : Cycle: [unfilled] [de-identified] : 72yo Male with pmh of BPH (on finasteride), HTN, HLD presented to the ER with lower abdominal pain and constipation for the past few days. The patient follows with Urologist Dr. Perez and private Oncologist in [place]. The patient mentioned that he is not aware of the prostate cancer diagnosis and was being managed for BPH by urology. He mentioned that he had a bladder stone removal in 2021 and had colonoscopy recently in Feb which showed 5 polyps. The patient has increase in urinary frequency and nocturia but denies any dysuria and attributes it to Finasteride. He has FHx ( 2 brothers) of prostate cancer. The patient mentioned that he would like to see his private oncologist rather than get treatment with oncology team here. The patient has been making urine and Hensley was in ED as requested by urology and has mild hematuria (likely due to traumatic Hensley).  CT A/P 3/8/24: KIDNEYS/PELVIC ORGANS: Left lateral urinary bladder wall masslike thickening, contiguous with prostate, with extravesicular tumor infiltration. Mass at region of left UVJ, with resultant moderate left hydroureteronephrosis. Urinary bladder distended. BPH with median lobe hypertrophy. Three urinary bladder intraluminal calculi, measuring up to 0.5 cm. Relation of prostate gland and rectum limited by modality. Right mild hydronephrosis or fullness. ABDOMINOPELVIC NODES: Suspicious pelvic lymphadenopathy, including left medial external iliac chain 2 x 1.6 cm lymph node, right internal iliac chain 1.9 x 1.2 cm lymph node, multiple presacral lymph nodes measuring up to 0.9 cm short axis, and perirectal lymph nodes measuring up to 1.5 x 1 cm.  PERITONEUM/MESENTERY/BOWEL: Post appendectomy. No bowel obstruction. No ascites. Colonic diverticulosis. Mild colonic stool burden..  BONES/SOFT TISSUES: Patchy faint sclerosis throughout visualized spine, bony pelvis and bilateral ribs, suspicious for osteoblastic metastatic bone disease.  OTHER: Vascular calcifications.   IMPRESSION:  1. Left lateral urinary bladder wall masslike thickening, contiguous with prostate, with extravesicular tumor infiltration. Mass at region of left UVJ, with resultant moderate left hydroureteronephrosis. Urinary bladder distended. Right mild hydronephrosis or fullness. BPH with median lobe hypertrophy. 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 Findings compatible with metastatic neoplasm; prostate cancer invading bladder favored over primary urinary bladder neoplasm given suspicious osteoblastic metastatic bone disease.. Correlation with PSA recommended.  2. Suspicious pelvic lymphadenopathy, catalogued above.  3. Mild colonic stool burden. No bowel obstruction. Relation of prostate gland and rectum limited by modality.     BONE SCAN 3/11/24 Extensive multifocal increased activity throughout the bones, compatible with diffuse osseous metastatic disease. There is multifocal increased activity in the bilateral ribs, right humeral head, left mid humerus, pelvis, bilateral femurs, thoracolumbar spine, and calvarium.  [FreeTextEntry1] : Started Taxotere on 4.18.2024 - 8.1.2024  [de-identified] : 4/8/24 Patient is here for a follow-up visit for metastatic prostate cancer, accompanied by son.  He is feeling well with no new complaints.  Reviewed pathology from recent left iliac bone biopsy which was diagnostic for metastatic carcinoma of the prostate.  He recently started Casodex 50mg once daily on 4.1.2024 as per recommendations from Urology.  Patient denies fever, chills, nausea, vomiting or dyspnea.  He reports hematuria.     5/9/24 Patient is here for a follow-up visit for metastatic prostate cancer, accompanied by spouse.  Patient started Lupron 22.5mg every 3 months on 4.18.2024.  Reviewed most recent CBC which shows moderate leukocytosis and anemia with hgb 9.1g/dL.  Most recent PSA was 6.16ng/mL from 04/2024.  He continues on Casodex 50mg once daily.  He has not had any more episodes of hematuria.   He is due for cycle 2 of Taxotere on 5/9, initiated 4.18.2024.  He reports dysgeusia and fatigue.  Patient denies fever, chills, nausea, vomiting or dyspnea.    5/30/24 Patient is here for a follow-up visit for metastatic prostate cancer, accompanied by spouse.  Patient started Lupron 22.5mg every 3 months on 4.18.2024.  Reviewed most recent CBC which shows moderate leukocytosis and anemia with hgb 9.1g/dL.  Most recent PSA was 6.16ng/mL from 04/2024.  He continues on Casodex 50mg once daily.  He has not had any more episodes of hematuria.   He is due for cycle 3 of Taxotere on 5/30, initiated 4.18.2024.  He reports dysgeusia and fatigue which has affected his appetite and results in weight loss since last visit.  Patient denies fever, chills, nausea, vomiting or dyspnea.    6/20/24 Patient is here for a follow-up visit for metastatic prostate cancer, accompanied by spouse.  Since last visit, patient started Nubeqa (Darolutamide) twice daily in 06/2024.  Patient started Lupron 22.5mg every 3 months on 4.18.2024.  Reviewed most recent CBC which shows severe anemia with hgb 7.8g/dL.  Most recent PSA was 0.38ng/mL from 06/2024.  He continues on Casodex 50mg once daily.  He has not had any more episodes of hematuria.   He is due for cycle 4 of Taxotere on 6/20, initiated 4.18.2024.  He states his energy has been good this past week but occasionally he becomes fatigued.  Patient denies fever, chills, dizziness, chest pain, palpitations, nausea, vomiting or dyspnea.    7/11/24 Patient is here for a follow-up visit for metastatic prostate cancer, accompanied by spouse.  Patient continues Nubeqa (Darolutamide) twice daily, initiated in 06/2024.  Patient started Lupron 22.5mg every 3 months on 4.18.2024.  Reviewed most recent CBC which shows severe anemia with hgb 9.7/dL.  Most recent PSA was 0.38ng/mL from 06/2024.  He continues on Casodex 50mg once daily.  He is due for cycle 5 of Taxotere on 7/11, initiated 4.18.2024.  Patient denies fever, chills, dizziness, chest pain, palpitations, nausea, vomiting or dyspnea.  Reviewed most recent PET/CT which redemonstrates known PSMA avid osseous lesions, and decreased in size of iliac chain lymph nodes.   PET/CT PSMA  (7.10.2024) IMPRESSION:Innumerable 2+ and 3+ PSMA avid osseous lesions throughout the skeleton and the prostate gland consistent with known metastatic malignancy.A total of three 2+ and 3+ right iliac chain lymph nodes are decreased in size since CT abdomen pelvis on 3/8/2024 (previously measuring up to 1.4 cm, currently subcentimeter on series 3 image 131 along the right inner pelvic sidewall and series 3 image 155).  8/1/24 Patient is here for a follow-up visit for metastatic prostate cancer, accompanied by spouse.  He is due for cycle 6 of Taxotere on 8/1, initiated 4.18.2024.  Patient continues Nubeqa (Darolutamide) twice daily, initiated in 06/2024.  He continues on Casodex 50mg once daily.  Patient started Lupron 22.5mg every 3 months on 4.18.2024.  Reviewed most recent CBC which shows moderate anemia with hgb 8.8g/dL.  Most recent PSA was 0.13ng/mL from 07/2024.  Patient denies fever, chills, dizziness, chest pain, palpitations, nausea, vomiting or dyspnea.    8/22/24 Patient is here for a follow-up visit for metastatic prostate cancer, accompanied by spouse.  He completed 6 cycles of Taxotere on 8.1.2024, initiated 4.18.2024.  Patient continues Nubeqa (Darolutamide) twice daily, initiated in 06/2024.  Patient is due for Lupron 22.5mg every 3 months in 10/2024, initiated on 4.18.2024.  Reviewed most recent CBC which shows moderate anemia with hgb 9.2g/dL.  Most recent PSA was down to 0.08ng/mL from 08/2024.  Patient denies fever, chills, dizziness, palpitations, nausea, vomiting or dyspnea.  He states over the past few months he has been experiencing chest tightness when he walks.  He has nephrostomy exchange on 9/25.

## 2024-08-23 NOTE — PHYSICAL EXAM
[Restricted in physically strenuous activity but ambulatory and able to carry out work of a light or sedentary nature] : Status 1- Restricted in physically strenuous activity but ambulatory and able to carry out work of a light or sedentary nature, e.g., light house work, office work [Normal] : affect appropriate [de-identified] : trace bilateral ankle edema

## 2024-08-23 NOTE — ASSESSMENT
[FreeTextEntry1] : 72 YO man, non smoker, was evaluated inpatient for abdomen pain and found to have pelvic mass (see CT scan below), with lymphadenopathy and bone masses.   # Metastatic Prostate Cancer, Stage IV, prognostic grade group 5, [place] score 10 (5+5), dx 03/2024  - s/p left iliac bone biopsy with IR on 3.15.2024 which was diagnostic for metastatic carcinoma of the prostate - s/p TURP 3/29/24  - Perineural invasion is identified. - Rare focus suspicious for lymphovascular invasion. - reviewed radiology, pathology and lab workup and had a discussion regarding implications of diagnosis, prognosis and options for management including but not limited to chemotherapy + ADT  - STOPPED Casodex 50mg once daily, initiated 4.1.2024 - 05/2024  - PET PSMA done following 4 cycles of chemotherapy 7.10.2024 redemonstrates known PSMA avid osseous lesions, and decreased in size of iliac chain lymph nodes.   - c/w Lupron 22.5mg every 12 weeks on 7/11, initiated 4.18.2024, next due 10/2024  - c/w cycle 6 out of 6 of Taxotere on 8/1, initiated 4.18.2024  - c/w pre-medication of Taxotere 8mg BiD starting day for 3 days starting 1 day prior to TAXOTERE administration in order to reduce the incidence and severity of fluid retention as well as the severity of hypersensitivity reactions - c/w Zometa IV every 4 weeks to preserve bone strength and prevent fracture; risks vs benefits discussed, dental clearance obtained.  He will continue Calcium as recommended.  - c/w Darolutamide 600mg every 12 hours , initiated 06/2024  - recommend to repeat PET/CT PSMA in 3 months (~10/2024) or sooner based on symptomology  - followup with URO, Dr. 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    RTC in 1 month with Dr. Cheung with CBC, BMP, LFTs, PSA, BCR-ABL level 2 weeks prior   seen/ examined w/ NP Vanessa; note reviewed; case discussed; agree w/ plan  continue ADT completed  taxotere 6; cycles had long discsussion with radiology regarding PET scan results": there is clearly dramatic bone involvement; can not state if disease progressed or not since this is the first PET; he is not symptomatic; will continue the same therapy; will repeat PET in 3 months, (09/2024 added Darolutamide beginning 5/30/2024 get PSA

## 2024-08-23 NOTE — HISTORY OF PRESENT ILLNESS
[Therapy: ___] : Therapy: [unfilled] [Cycle: ___] : Cycle: [unfilled] [de-identified] : 74yo Male with pmh of BPH (on finasteride), HTN, HLD presented to the ER with lower abdominal pain and constipation for the past few days. The patient follows with Urologist Dr. Perez and private Oncologist in [place]. The patient mentioned that he is not aware of the prostate cancer diagnosis and was being managed for BPH by urology. He mentioned that he had a bladder stone removal in 2021 and had colonoscopy recently in Feb which showed 5 polyps. The patient has increase in urinary frequency and nocturia but denies any dysuria and attributes it to Finasteride. He has FHx ( 2 brothers) of prostate cancer. The patient mentioned that he would like to see his private oncologist rather than get treatment with oncology team here. The patient has been making urine and Hensley was in ED as requested by urology and has mild hematuria (likely due to traumatic [place]).  CT A/P 3/8/24: KIDNEYS/PELVIC ORGANS: Left lateral urinary bladder wall masslike thickening, contiguous with prostate, with extravesicular tumor infiltration. Mass at region of left UVJ, with resultant moderate left hydroureteronephrosis. Urinary bladder distended. BPH with median lobe hypertrophy. Three urinary bladder intraluminal calculi, measuring up to 0.5 cm. Relation of prostate gland and rectum limited by modality. Right mild hydronephrosis or fullness. ABDOMINOPELVIC NODES: Suspicious pelvic lymphadenopathy, including left medial external iliac chain 2 x 1.6 cm lymph node, right internal iliac chain 1.9 x 1.2 cm lymph node, multiple presacral lymph nodes measuring up to 0.9 cm short axis, and perirectal lymph nodes measuring up to 1.5 x 1 cm.  PERITONEUM/MESENTERY/BOWEL: Post appendectomy. No bowel obstruction. No ascites. Colonic diverticulosis. Mild colonic stool burden..  BONES/SOFT TISSUES: Patchy faint sclerosis throughout visualized spine, bony pelvis and bilateral ribs, suspicious for osteoblastic metastatic bone disease.  OTHER: Vascular calcifications.   IMPRESSION:  1. Left lateral urinary bladder wall masslike thickening, contiguous with prostate, with extravesicular tumor infiltration. Mass at region of left UVJ, with resultant moderate left hydroureteronephrosis. Urinary bladder distended. Right mild hydronephrosis or fullness. BPH with median lobe hypertrophy. 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 Findings compatible with metastatic neoplasm; prostate cancer invading bladder favored over primary urinary bladder neoplasm given suspicious osteoblastic metastatic bone disease.. Correlation with PSA recommended.  2. Suspicious pelvic lymphadenopathy, catalogued above.  3. Mild colonic stool burden. No bowel obstruction. Relation of prostate gland and rectum limited by modality.     BONE SCAN 3/11/24 Extensive multifocal increased activity throughout the bones, compatible with diffuse osseous metastatic disease. There is multifocal increased activity in the bilateral ribs, right humeral head, left mid humerus, pelvis, bilateral femurs, thoracolumbar spine, and calvarium.  [FreeTextEntry1] : Started Taxotere on 4.18.2024 - 8.1.2024  [de-identified] : 4/8/24 Patient is here for a follow-up visit for metastatic prostate cancer, accompanied by son.  He is feeling well with no new complaints.  Reviewed pathology from recent left iliac bone biopsy which was diagnostic for metastatic carcinoma of the prostate.  He recently started Casodex 50mg once daily on 4.1.2024 as per recommendations from Urology.  Patient denies fever, chills, nausea, vomiting or dyspnea.  He reports hematuria.     5/9/24 Patient is here for a follow-up visit for metastatic prostate cancer, accompanied by spouse.  Patient started Lupron 22.5mg every 3 months on 4.18.2024.  Reviewed most recent CBC which shows moderate leukocytosis and anemia with hgb 9.1g/dL.  Most recent PSA was 6.16ng/mL from 04/2024.  He continues on Casodex 50mg once daily.  He has not had any more episodes of hematuria.   He is due for cycle 2 of Taxotere on 5/9, initiated 4.18.2024.  He reports dysgeusia and fatigue.  Patient denies fever, chills, nausea, vomiting or dyspnea.    5/30/24 Patient is here for a follow-up visit for metastatic prostate cancer, accompanied by spouse.  Patient started Lupron 22.5mg every 3 months on 4.18.2024.  Reviewed most recent CBC which shows moderate leukocytosis and anemia with hgb 9.1g/dL.  Most recent PSA was 6.16ng/mL from 04/2024.  He continues on Casodex 50mg once daily.  He has not had any more episodes of hematuria.   He is due for cycle 3 of Taxotere on 5/30, initiated 4.18.2024.  He reports dysgeusia and fatigue which has affected his appetite and results in weight loss since last visit.  Patient denies fever, chills, nausea, vomiting or dyspnea.    6/20/24 Patient is here for a follow-up visit for metastatic prostate cancer, accompanied by spouse.  Since last visit, patient started Nubeqa (Darolutamide) twice daily in 06/2024.  Patient started Lupron 22.5mg every 3 months on 4.18.2024.  Reviewed most recent CBC which shows severe anemia with hgb 7.8g/dL.  Most recent PSA was 0.38ng/mL from 06/2024.  He continues on Casodex 50mg once daily.  He has not had any more episodes of hematuria.   He is due for cycle 4 of Taxotere on 6/20, initiated 4.18.2024.  He states his energy has been good this past week but occasionally he becomes fatigued.  Patient denies fever, chills, dizziness, chest pain, palpitations, nausea, vomiting or dyspnea.    7/11/24 Patient is here for a follow-up visit for metastatic prostate cancer, accompanied by spouse.  Patient continues Nubeqa (Darolutamide) twice daily, initiated in 06/2024.  Patient started Lupron 22.5mg every 3 months on 4.18.2024.  Reviewed most recent CBC which shows severe anemia with hgb 9.7/dL.  Most recent PSA was 0.38ng/mL from 06/2024.  He continues on Casodex 50mg once daily.  He is due for cycle 5 of Taxotere on 7/11, initiated 4.18.2024.  Patient denies fever, chills, dizziness, chest pain, palpitations, nausea, vomiting or dyspnea.  Reviewed most recent PET/CT which redemonstrates known PSMA avid osseous lesions, and decreased in size of iliac chain lymph nodes.   PET/CT PSMA  (7.10.2024) IMPRESSION:Innumerable 2+ and 3+ PSMA avid osseous lesions throughout the skeleton and the prostate gland consistent with known metastatic malignancy.A total of three 2+ and 3+ right iliac chain lymph nodes are decreased in size since CT abdomen pelvis on 3/8/2024 (previously measuring up to 1.4 cm, currently subcentimeter on series 3 image 131 along the right inner pelvic sidewall and series 3 image 155).  8/1/24 Patient is here for a follow-up visit for metastatic prostate cancer, accompanied by spouse.  He is due for cycle 6 of Taxotere on 8/1, initiated 4.18.2024.  Patient continues Nubeqa (Darolutamide) twice daily, initiated in 06/2024.  He continues on Casodex 50mg once daily.  Patient started Lupron 22.5mg every 3 months on 4.18.2024.  Reviewed most recent CBC which shows moderate anemia with hgb 8.8g/dL.  Most recent PSA was 0.13ng/mL from 07/2024.  Patient denies fever, chills, dizziness, chest pain, palpitations, nausea, vomiting or dyspnea.    8/22/24 Patient is here for a follow-up visit for metastatic prostate cancer, accompanied by spouse.  He completed 6 cycles of Taxotere on 8.1.2024, initiated 4.18.2024.  Patient continues Nubeqa (Darolutamide) twice daily, initiated in 06/2024.  Patient is due for Lupron 22.5mg every 3 months in 10/2024, initiated on 4.18.2024.  Reviewed most recent CBC which shows moderate anemia with hgb 9.2g/dL.  Most recent PSA was down to 0.08ng/mL from 08/2024.  Patient denies fever, chills, dizziness, palpitations, nausea, vomiting or dyspnea.  He states over the past few months he has been experiencing chest tightness when he walks.  He has nephrostomy exchange on 9/25.

## 2024-08-23 NOTE — REVIEW OF SYSTEMS
[Fatigue] : fatigue [Recent Change In Weight] : ~T recent weight change [Lower Ext Edema] : lower extremity edema [Constipation] : constipation [Diarrhea: Grade 0] : Diarrhea: Grade 0 [Negative] : Allergic/Immunologic [Fever] : no fever [Chills] : no chills [Night Sweats] : no night sweats [Eye Pain] : no eye pain [Dysphagia] : no dysphagia [Odynophagia] : no odynophagia [Chest Pain] : no chest pain [Palpitations] : no palpitations [Shortness Of Breath] : no shortness of breath [Abdominal Pain] : no abdominal pain [Dysuria] : no dysuria [Incontinence] : no incontinence [Joint Pain] : no joint pain [Skin Rash] : no skin rash [Confused] : no confusion [Suicidal] : not suicidal [FreeTextEntry2] : gained some weight [FreeTextEntry5] : occasionally experiences chest tightness when he walks, resolves with rest  [FreeTextEntry8] : urine bag draining clear yellow  [de-identified] : reports mild change in sensation of feet starting around Cycle 3

## 2024-08-23 NOTE — REVIEW OF SYSTEMS
[Fatigue] : fatigue [Recent Change In Weight] : ~T recent weight change [Lower Ext Edema] : lower extremity edema [Constipation] : constipation [Diarrhea: Grade 0] : Diarrhea: Grade 0 [Negative] : Allergic/Immunologic [Fever] : no fever [Chills] : no chills [Night Sweats] : no night sweats [Eye Pain] : no eye pain [Dysphagia] : no dysphagia [Odynophagia] : no odynophagia [Chest Pain] : no chest pain [Palpitations] : no palpitations [Shortness Of Breath] : no shortness of breath [Abdominal Pain] : no abdominal pain [Dysuria] : no dysuria [Incontinence] : no incontinence [Joint Pain] : no joint pain [Skin Rash] : no skin rash [Confused] : no confusion [Suicidal] : not suicidal [FreeTextEntry2] : gained some weight [FreeTextEntry5] : occasionally experiences chest tightness when he walks, resolves with rest  [FreeTextEntry8] : urine bag draining clear yellow  [de-identified] : reports mild change in sensation of feet starting around Cycle 3

## 2024-08-23 NOTE — HISTORY OF PRESENT ILLNESS
[Therapy: ___] : Therapy: [unfilled] [Cycle: ___] : Cycle: [unfilled] [de-identified] : 74yo Male with pmh of BPH (on finasteride), HTN, HLD presented to the ER with lower abdominal pain and constipation for the past few days. The patient follows with Urologist Dr. Perez and private Oncologist in [place]. The patient mentioned that he is not aware of the prostate cancer diagnosis and was being managed for BPH by urology. He mentioned that he had a bladder stone removal in 2021 and had colonoscopy recently in Feb which showed 5 polyps. The patient has increase in urinary frequency and nocturia but denies any dysuria and attributes it to Finasteride. He has FHx ( 2 brothers) of prostate cancer. The patient mentioned that he would like to see his private oncologist rather than get treatment with oncology team here. The patient has been making urine and [place] was in ED as requested by urology and has mild hematuria (likely due to traumatic [place]).  CT A/P 3/8/24: KIDNEYS/PELVIC ORGANS: Left lateral urinary bladder wall masslike thickening, contiguous with prostate, with extravesicular tumor infiltration. Mass at region of left UVJ, with resultant moderate left hydroureteronephrosis. Urinary bladder distended. BPH with median lobe hypertrophy. Three urinary bladder intraluminal calculi, measuring up to 0.5 cm. Relation of prostate gland and rectum limited by modality. Right mild hydronephrosis or fullness. ABDOMINOPELVIC NODES: Suspicious pelvic lymphadenopathy, including left medial external iliac chain 2 x 1.6 cm lymph node, right internal iliac chain 1.9 x 1.2 cm lymph node, multiple presacral lymph nodes measuring up to 0.9 cm short axis, and perirectal lymph nodes measuring up to 1.5 x 1 cm.  PERITONEUM/MESENTERY/BOWEL: Post appendectomy. No bowel obstruction. No ascites. Colonic diverticulosis. Mild colonic stool burden..  BONES/SOFT TISSUES: Patchy faint sclerosis throughout visualized spine, bony pelvis and bilateral ribs, suspicious for osteoblastic metastatic bone disease.  OTHER: Vascular calcifications.   IMPRESSION:  1. Left lateral urinary bladder wall masslike thickening, contiguous with prostate, with extravesicular tumor infiltration. Mass at region of left UVJ, with resultant moderate left hydroureteronephrosis. Urinary bladder distended. Right mild hydronephrosis or fullness. BPH with median lobe hypertrophy. 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 Findings compatible with metastatic neoplasm; prostate cancer invading bladder favored over primary urinary bladder neoplasm given suspicious osteoblastic metastatic bone disease.. Correlation with PSA recommended.  2. Suspicious pelvic lymphadenopathy, catalogued above.  3. Mild colonic stool burden. No bowel obstruction. Relation of prostate gland and rectum limited by modality.     BONE SCAN 3/11/24 Extensive multifocal increased activity throughout the bones, compatible with diffuse osseous metastatic disease. There is multifocal increased activity in the bilateral ribs, right humeral head, left mid humerus, pelvis, bilateral femurs, thoracolumbar spine, and calvarium.  [FreeTextEntry1] : Started Taxotere on 4.18.2024 - 8.1.2024  [de-identified] : 4/8/24 Patient is here for a follow-up visit for metastatic prostate cancer, accompanied by son.  He is feeling well with no new complaints.  Reviewed pathology from recent left iliac bone biopsy which was diagnostic for metastatic carcinoma of the prostate.  He recently started Casodex 50mg once daily on 4.1.2024 as per recommendations from Urology.  Patient denies fever, chills, nausea, vomiting or dyspnea.  He reports hematuria.     5/9/24 Patient is here for a follow-up visit for metastatic prostate cancer, accompanied by spouse.  Patient started Lupron 22.5mg every 3 months on 4.18.2024.  Reviewed most recent CBC which shows moderate leukocytosis and anemia with hgb 9.1g/dL.  Most recent PSA was 6.16ng/mL from 04/2024.  He continues on Casodex 50mg once daily.  He has not had any more episodes of hematuria.   He is due for cycle 2 of Taxotere on 5/9, initiated 4.18.2024.  He reports dysgeusia and fatigue.  Patient denies fever, chills, nausea, vomiting or dyspnea.    5/30/24 Patient is here for a follow-up visit for metastatic prostate cancer, accompanied by spouse.  Patient started Lupron 22.5mg every 3 months on 4.18.2024.  Reviewed most recent CBC which shows moderate leukocytosis and anemia with hgb 9.1g/dL.  Most recent PSA was 6.16ng/mL from 04/2024.  He continues on Casodex 50mg once daily.  He has not had any more episodes of hematuria.   He is due for cycle 3 of Taxotere on 5/30, initiated 4.18.2024.  He reports dysgeusia and fatigue which has affected his appetite and results in weight loss since last visit.  Patient denies fever, chills, nausea, vomiting or dyspnea.    6/20/24 Patient is here for a follow-up visit for metastatic prostate cancer, accompanied by spouse.  Since last visit, patient started Nubeqa (Darolutamide) twice daily in 06/2024.  Patient started Lupron 22.5mg every 3 months on 4.18.2024.  Reviewed most recent CBC which shows severe anemia with hgb 7.8g/dL.  Most recent PSA was 0.38ng/mL from 06/2024.  He continues on Casodex 50mg once daily.  He has not had any more episodes of hematuria.   He is due for cycle 4 of Taxotere on 6/20, initiated 4.18.2024.  He states his energy has been good this past week but occasionally he becomes fatigued.  Patient denies fever, chills, dizziness, chest pain, palpitations, nausea, vomiting or dyspnea.    7/11/24 Patient is here for a follow-up visit for metastatic prostate cancer, accompanied by spouse.  Patient continues Nubeqa (Darolutamide) twice daily, initiated in 06/2024.  Patient started Lupron 22.5mg every 3 months on 4.18.2024.  Reviewed most recent CBC which shows severe anemia with hgb 9.7/dL.  Most recent PSA was 0.38ng/mL from 06/2024.  He continues on Casodex 50mg once daily.  He is due for cycle 5 of Taxotere on 7/11, initiated 4.18.2024.  Patient denies fever, chills, dizziness, chest pain, palpitations, nausea, vomiting or dyspnea.  Reviewed most recent PET/CT which redemonstrates known PSMA avid osseous lesions, and decreased in size of iliac chain lymph nodes.   PET/CT PSMA  (7.10.2024) IMPRESSION:Innumerable 2+ and 3+ PSMA avid osseous lesions throughout the skeleton and the prostate gland consistent with known metastatic malignancy.A total of three 2+ and 3+ right iliac chain lymph nodes are decreased in size since CT abdomen pelvis on 3/8/2024 (previously measuring up to 1.4 cm, currently subcentimeter on series 3 image 131 along the right inner pelvic sidewall and series 3 image 155).  8/1/24 Patient is here for a follow-up visit for metastatic prostate cancer, accompanied by spouse.  He is due for cycle 6 of Taxotere on 8/1, initiated 4.18.2024.  Patient continues Nubeqa (Darolutamide) twice daily, initiated in 06/2024.  He continues on Casodex 50mg once daily.  Patient started Lupron 22.5mg every 3 months on 4.18.2024.  Reviewed most recent CBC which shows moderate anemia with hgb 8.8g/dL.  Most recent PSA was 0.13ng/mL from 07/2024.  Patient denies fever, chills, dizziness, chest pain, palpitations, nausea, vomiting or dyspnea.    8/22/24 Patient is here for a follow-up visit for metastatic prostate cancer, accompanied by spouse.  He completed 6 cycles of Taxotere on 8.1.2024, initiated 4.18.2024.  Patient continues Nubeqa (Darolutamide) twice daily, initiated in 06/2024.  Patient is due for Lupron 22.5mg every 3 months in 10/2024, initiated on 4.18.2024.  Reviewed most recent CBC which shows moderate anemia with hgb 9.2g/dL.  Most recent PSA was down to 0.08ng/mL from 08/2024.  Patient denies fever, chills, dizziness, palpitations, nausea, vomiting or dyspnea.  He states over the past few months he has been experiencing chest tightness when he walks.  He has nephrostomy exchange on 9/25.

## 2024-08-23 NOTE — PHYSICAL EXAM
[Restricted in physically strenuous activity but ambulatory and able to carry out work of a light or sedentary nature] : Status 1- Restricted in physically strenuous activity but ambulatory and able to carry out work of a light or sedentary nature, e.g., light house work, office work [Normal] : affect appropriate [de-identified] : trace bilateral ankle edema

## 2024-08-23 NOTE — ASSESSMENT
[FreeTextEntry1] : 74 YO man, non smoker, was evaluated inpatient for abdomen pain and found to have pelvic mass (see CT scan below), with lymphadenopathy and bone masses.   # Metastatic Prostate Cancer, Stage IV, prognostic grade group 5, [place] score 10 (5+5), dx 03/2024  - s/p left iliac bone biopsy with IR on 3.15.2024 which was diagnostic for metastatic carcinoma of the prostate - s/p TURP 3/29/24  - Perineural invasion is identified. - Rare focus suspicious for lymphovascular invasion. - reviewed radiology, pathology and lab workup and had a discussion regarding implications of diagnosis, prognosis and options for management including but not limited to chemotherapy + ADT  - STOPPED Casodex 50mg once daily, initiated 4.1.2024 - 05/2024  - PET PSMA done following 4 cycles of chemotherapy 7.10.2024 redemonstrates known PSMA avid osseous lesions, and decreased in size of iliac chain lymph nodes.   - c/w Lupron 22.5mg every 12 weeks on 7/11, initiated 4.18.2024, next due 10/2024  - c/w cycle 6 out of 6 of Taxotere on 8/1, initiated 4.18.2024  - c/w pre-medication of Taxotere 8mg BiD starting day for 3 days starting 1 day prior to TAXOTERE administration in order to reduce the incidence and severity of fluid retention as well as the severity of hypersensitivity reactions - c/w Zometa IV every 4 weeks to preserve bone strength and prevent fracture; risks vs benefits discussed, dental clearance obtained.  He will continue Calcium as recommended.  - c/w Darolutamide 600mg every 12 hours , initiated 06/2024  - recommend to repeat PET/CT PSMA in 3 months (~10/2024) or sooner based on symptomology  - followup with URO, Dr. 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    RTC in 1 month with Dr. Cheung with CBC, BMP, LFTs, PSA, BCR-ABL level 2 weeks prior   seen/ examined w/ NP Vanessa; note reviewed; case discussed; agree w/ plan  continue ADT completed  taxotere 6; cycles had long discsussion with radiology regarding PET scan results": there is clearly dramatic bone involvement; can not state if disease progressed or not since this is the first PET; he is not symptomatic; will continue the same therapy; will repeat PET in 3 months, (09/2024 added Darolutamide beginning 5/30/2024 get PSA

## 2024-08-23 NOTE — REVIEW OF SYSTEMS
[Fatigue] : fatigue [Recent Change In Weight] : ~T recent weight change [Lower Ext Edema] : lower extremity edema [Constipation] : constipation [Diarrhea: Grade 0] : Diarrhea: Grade 0 [Negative] : Allergic/Immunologic [Fever] : no fever [Chills] : no chills [Night Sweats] : no night sweats [Eye Pain] : no eye pain [Dysphagia] : no dysphagia [Odynophagia] : no odynophagia [Chest Pain] : no chest pain [Palpitations] : no palpitations [Shortness Of Breath] : no shortness of breath [Abdominal Pain] : no abdominal pain [Dysuria] : no dysuria [Incontinence] : no incontinence [Joint Pain] : no joint pain [Skin Rash] : no skin rash [Confused] : no confusion [Suicidal] : not suicidal [FreeTextEntry2] : gained some weight [FreeTextEntry5] : occasionally experiences chest tightness when he walks, resolves with rest  [FreeTextEntry8] : urine bag draining clear yellow  [de-identified] : reports mild change in sensation of feet starting around Cycle 3

## 2024-08-23 NOTE — ASSESSMENT
[FreeTextEntry1] : 72 YO man, non smoker, was evaluated inpatient for abdomen pain and found to have pelvic mass (see CT scan below), with lymphadenopathy and bone masses.   # Metastatic Prostate Cancer, Stage IV, prognostic grade group 5, [place] score 10 (5+5), dx 03/2024  - s/p left iliac bone biopsy with IR on 3.15.2024 which was diagnostic for metastatic carcinoma of the prostate - s/p TURP 3/29/24  - Perineural invasion is identified. - Rare focus suspicious for lymphovascular invasion. - reviewed radiology, pathology and lab workup and had a discussion regarding implications of diagnosis, prognosis and options for management including but not limited to chemotherapy + ADT  - STOPPED Casodex 50mg once daily, initiated 4.1.2024 - 05/2024  - PET PSMA done following 4 cycles of chemotherapy 7.10.2024 redemonstrates known PSMA avid osseous lesions, and decreased in size of iliac chain lymph nodes.   - c/w Lupron 22.5mg every 12 weeks on 7/11, initiated 4.18.2024, next due 10/2024  - c/w cycle 6 out of 6 of Taxotere on 8/1, initiated 4.18.2024  - c/w pre-medication of Taxotere 8mg BiD starting day for 3 days starting 1 day prior to TAXOTERE administration in order to reduce the incidence and severity of fluid retention as well as the severity of hypersensitivity reactions - c/w Zometa IV every 4 weeks to preserve bone strength and prevent fracture; risks vs benefits discussed, dental clearance obtained.  He will continue Calcium as recommended.  - c/w Darolutamide 600mg every 12 hours , initiated 06/2024  - recommend to repeat PET/CT PSMA in 3 months (~10/2024) or sooner based on symptomology  - followup with URO, Dr. 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    RTC in 1 month with Dr. Cheung with CBC, BMP, LFTs, PSA, BCR-ABL level 2 weeks prior   seen/ examined w/ NP Vanessa; note reviewed; case discussed; agree w/ plan  continue ADT completed  taxotere 6; cycles had long discsussion with radiology regarding PET scan results": there is clearly dramatic bone involvement; can not state if disease progressed or not since this is the first PET; he is not symptomatic; will continue the same therapy; will repeat PET in 3 months, (09/2024 added Darolutamide beginning 5/30/2024 get PSA

## 2024-08-23 NOTE — PHYSICAL EXAM
[Restricted in physically strenuous activity but ambulatory and able to carry out work of a light or sedentary nature] : Status 1- Restricted in physically strenuous activity but ambulatory and able to carry out work of a light or sedentary nature, e.g., light house work, office work [Normal] : affect appropriate [de-identified] : trace bilateral ankle edema

## 2024-08-23 NOTE — HISTORY OF PRESENT ILLNESS
[Therapy: ___] : Therapy: [unfilled] [Cycle: ___] : Cycle: [unfilled] [de-identified] : 72yo Male with pmh of BPH (on finasteride), HTN, HLD presented to the ER with lower abdominal pain and constipation for the past few days. The patient follows with Urologist Dr. Perez and private Oncologist in [place]. The patient mentioned that he is not aware of the prostate cancer diagnosis and was being managed for BPH by urology. He mentioned that he had a bladder stone removal in 2021 and had colonoscopy recently in Feb which showed 5 polyps. The patient has increase in urinary frequency and nocturia but denies any dysuria and attributes it to Finasteride. He has FHx ( 2 brothers) of prostate cancer. The patient mentioned that he would like to see his private oncologist rather than get treatment with oncology team here. The patient has been making urine and Hens[place] was in ED as requested by urology and has mild hematuria (likely due to traumatic [place]).  CT A/P 3/8/24: KIDNEYS/PELVIC ORGANS: Left lateral urinary bladder wall masslike thickening, contiguous with prostate, with extravesicular tumor infiltration. Mass at region of left UVJ, with resultant moderate left hydroureteronephrosis. Urinary bladder distended. BPH with median lobe hypertrophy. Three urinary bladder intraluminal calculi, measuring up to 0.5 cm. Relation of prostate gland and rectum limited by modality. Right mild hydronephrosis or fullness. ABDOMINOPELVIC NODES: Suspicious pelvic lymphadenopathy, including left medial external iliac chain 2 x 1.6 cm lymph node, right internal iliac chain 1.9 x 1.2 cm lymph node, multiple presacral lymph nodes measuring up to 0.9 cm short axis, and perirectal lymph nodes measuring up to 1.5 x 1 cm.  PERITONEUM/MESENTERY/BOWEL: Post appendectomy. No bowel obstruction. No ascites. Colonic diverticulosis. Mild colonic stool burden..  BONES/SOFT TISSUES: Patchy faint sclerosis throughout visualized spine, bony pelvis and bilateral ribs, suspicious for osteoblastic metastatic bone disease.  OTHER: Vascular calcifications.   IMPRESSION:  1. Left lateral urinary bladder wall masslike thickening, contiguous with prostate, with extravesicular tumor infiltration. Mass at region of left UVJ, with resultant moderate left hydroureteronephrosis. Urinary bladder distended. Right mild hydronephrosis or fullness. BPH with median lobe hypertrophy. 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 Findings compatible with metastatic neoplasm; prostate cancer invading bladder favored over primary urinary bladder neoplasm given suspicious osteoblastic metastatic bone disease.. Correlation with PSA recommended.  2. Suspicious pelvic lymphadenopathy, catalogued above.  3. Mild colonic stool burden. No bowel obstruction. Relation of prostate gland and rectum limited by modality.     BONE SCAN 3/11/24 Extensive multifocal increased activity throughout the bones, compatible with diffuse osseous metastatic disease. There is multifocal increased activity in the bilateral ribs, right humeral head, left mid humerus, pelvis, bilateral femurs, thoracolumbar spine, and calvarium.  [FreeTextEntry1] : Started Taxotere on 4.18.2024 - 8.1.2024  [de-identified] : 4/8/24 Patient is here for a follow-up visit for metastatic prostate cancer, accompanied by son.  He is feeling well with no new complaints.  Reviewed pathology from recent left iliac bone biopsy which was diagnostic for metastatic carcinoma of the prostate.  He recently started Casodex 50mg once daily on 4.1.2024 as per recommendations from Urology.  Patient denies fever, chills, nausea, vomiting or dyspnea.  He reports hematuria.     5/9/24 Patient is here for a follow-up visit for metastatic prostate cancer, accompanied by spouse.  Patient started Lupron 22.5mg every 3 months on 4.18.2024.  Reviewed most recent CBC which shows moderate leukocytosis and anemia with hgb 9.1g/dL.  Most recent PSA was 6.16ng/mL from 04/2024.  He continues on Casodex 50mg once daily.  He has not had any more episodes of hematuria.   He is due for cycle 2 of Taxotere on 5/9, initiated 4.18.2024.  He reports dysgeusia and fatigue.  Patient denies fever, chills, nausea, vomiting or dyspnea.    5/30/24 Patient is here for a follow-up visit for metastatic prostate cancer, accompanied by spouse.  Patient started Lupron 22.5mg every 3 months on 4.18.2024.  Reviewed most recent CBC which shows moderate leukocytosis and anemia with hgb 9.1g/dL.  Most recent PSA was 6.16ng/mL from 04/2024.  He continues on Casodex 50mg once daily.  He has not had any more episodes of hematuria.   He is due for cycle 3 of Taxotere on 5/30, initiated 4.18.2024.  He reports dysgeusia and fatigue which has affected his appetite and results in weight loss since last visit.  Patient denies fever, chills, nausea, vomiting or dyspnea.    6/20/24 Patient is here for a follow-up visit for metastatic prostate cancer, accompanied by spouse.  Since last visit, patient started Nubeqa (Darolutamide) twice daily in 06/2024.  Patient started Lupron 22.5mg every 3 months on 4.18.2024.  Reviewed most recent CBC which shows severe anemia with hgb 7.8g/dL.  Most recent PSA was 0.38ng/mL from 06/2024.  He continues on Casodex 50mg once daily.  He has not had any more episodes of hematuria.   He is due for cycle 4 of Taxotere on 6/20, initiated 4.18.2024.  He states his energy has been good this past week but occasionally he becomes fatigued.  Patient denies fever, chills, dizziness, chest pain, palpitations, nausea, vomiting or dyspnea.    7/11/24 Patient is here for a follow-up visit for metastatic prostate cancer, accompanied by spouse.  Patient continues Nubeqa (Darolutamide) twice daily, initiated in 06/2024.  Patient started Lupron 22.5mg every 3 months on 4.18.2024.  Reviewed most recent CBC which shows severe anemia with hgb 9.7/dL.  Most recent PSA was 0.38ng/mL from 06/2024.  He continues on Casodex 50mg once daily.  He is due for cycle 5 of Taxotere on 7/11, initiated 4.18.2024.  Patient denies fever, chills, dizziness, chest pain, palpitations, nausea, vomiting or dyspnea.  Reviewed most recent PET/CT which redemonstrates known PSMA avid osseous lesions, and decreased in size of iliac chain lymph nodes.   PET/CT PSMA  (7.10.2024) IMPRESSION:Innumerable 2+ and 3+ PSMA avid osseous lesions throughout the skeleton and the prostate gland consistent with known metastatic malignancy.A total of three 2+ and 3+ right iliac chain lymph nodes are decreased in size since CT abdomen pelvis on 3/8/2024 (previously measuring up to 1.4 cm, currently subcentimeter on series 3 image 131 along the right inner pelvic sidewall and series 3 image 155).  8/1/24 Patient is here for a follow-up visit for metastatic prostate cancer, accompanied by spouse.  He is due for cycle 6 of Taxotere on 8/1, initiated 4.18.2024.  Patient continues Nubeqa (Darolutamide) twice daily, initiated in 06/2024.  He continues on Casodex 50mg once daily.  Patient started Lupron 22.5mg every 3 months on 4.18.2024.  Reviewed most recent CBC which shows moderate anemia with hgb 8.8g/dL.  Most recent PSA was 0.13ng/mL from 07/2024.  Patient denies fever, chills, dizziness, chest pain, palpitations, nausea, vomiting or dyspnea.    8/22/24 Patient is here for a follow-up visit for metastatic prostate cancer, accompanied by spouse.  He completed 6 cycles of Taxotere on 8.1.2024, initiated 4.18.2024.  Patient continues Nubeqa (Darolutamide) twice daily, initiated in 06/2024.  Patient is due for Lupron 22.5mg every 3 months in 10/2024, initiated on 4.18.2024.  Reviewed most recent CBC which shows moderate anemia with hgb 9.2g/dL.  Most recent PSA was down to 0.08ng/mL from 08/2024.  Patient denies fever, chills, dizziness, palpitations, nausea, vomiting or dyspnea.  He states over the past few months he has been experiencing chest tightness when he walks.  He has nephrostomy exchange on 9/25.

## 2024-08-23 NOTE — ASSESSMENT
[FreeTextEntry1] : 74 YO man, non smoker, was evaluated inpatient for abdomen pain and found to have pelvic mass (see CT scan below), with lymphadenopathy and bone masses.   # Metastatic Prostate Cancer, Stage IV, prognostic grade group 5, [place] score 10 (5+5), dx 03/2024  - s/p left iliac bone biopsy with IR on 3.15.2024 which was diagnostic for metastatic carcinoma of the prostate - s/p TURP 3/29/24  - Perineural invasion is identified. - Rare focus suspicious for lymphovascular invasion. - reviewed radiology, pathology and lab workup and had a discussion regarding implications of diagnosis, prognosis and options for management including but not limited to chemotherapy + ADT  - STOPPED Casodex 50mg once daily, initiated 4.1.2024 - 05/2024  - PET PSMA done following 4 cycles of chemotherapy 7.10.2024 redemonstrates known PSMA avid osseous lesions, and decreased in size of iliac chain lymph nodes.   - c/w Lupron 22.5mg every 12 weeks on 7/11, initiated 4.18.2024, next due 10/2024  - c/w cycle 6 out of 6 of Taxotere on 8/1, initiated 4.18.2024  - c/w pre-medication of Taxotere 8mg BiD starting day for 3 days starting 1 day prior to TAXOTERE administration in order to reduce the incidence and severity of fluid retention as well as the severity of hypersensitivity reactions - c/w Zometa IV every 4 weeks to preserve bone strength and prevent fracture; risks vs benefits discussed, dental clearance obtained.  He will continue Calcium as recommended.  - c/w Darolutamide 600mg every 12 hours , initiated 06/2024  - recommend to repeat PET/CT PSMA in 3 months (~10/2024) or sooner based on symptomology  - followup with URO, Dr. 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    RTC in 1 month with Dr. Cheung with CBC, BMP, LFTs, PSA, BCR-ABL level 2 weeks prior   seen/ examined w/ NP Vanessa; note reviewed; case discussed; agree w/ plan  continue ADT completed  taxotere 6; cycles had long discsussion with radiology regarding PET scan results": there is clearly dramatic bone involvement; can not state if disease progressed or not since this is the first PET; he is not symptomatic; will continue the same therapy; will repeat PET in 3 months, (09/2024 added Darolutamide beginning 5/30/2024 get PSA

## 2024-08-27 PROBLEM — R94.31 ABNORMAL EKG: Status: ACTIVE | Noted: 2024-08-27

## 2024-09-05 ENCOUNTER — RESULT CHARGE (OUTPATIENT)
Age: 74
End: 2024-09-05

## 2024-09-05 ENCOUNTER — OUTPATIENT (OUTPATIENT)
Dept: OUTPATIENT SERVICES | Facility: HOSPITAL | Age: 74
LOS: 1 days | End: 2024-09-05
Payer: MEDICARE

## 2024-09-05 ENCOUNTER — APPOINTMENT (OUTPATIENT)
Dept: CARDIOLOGY | Facility: CLINIC | Age: 74
End: 2024-09-05

## 2024-09-05 ENCOUNTER — APPOINTMENT (OUTPATIENT)
Age: 74
End: 2024-09-05

## 2024-09-05 VITALS
BODY MASS INDEX: 24.91 KG/M2 | SYSTOLIC BLOOD PRESSURE: 130 MMHG | HEART RATE: 81 BPM | WEIGHT: 155 LBS | HEIGHT: 66 IN | DIASTOLIC BLOOD PRESSURE: 78 MMHG

## 2024-09-05 VITALS — SYSTOLIC BLOOD PRESSURE: 129 MMHG | HEART RATE: 88 BPM | TEMPERATURE: 98 F | DIASTOLIC BLOOD PRESSURE: 75 MMHG

## 2024-09-05 DIAGNOSIS — E78.5 HYPERLIPIDEMIA, UNSPECIFIED: ICD-10-CM

## 2024-09-05 DIAGNOSIS — R06.02 SHORTNESS OF BREATH: ICD-10-CM

## 2024-09-05 DIAGNOSIS — I10 ESSENTIAL (PRIMARY) HYPERTENSION: ICD-10-CM

## 2024-09-05 DIAGNOSIS — N21.0 CALCULUS IN BLADDER: Chronic | ICD-10-CM

## 2024-09-05 DIAGNOSIS — Z90.49 ACQUIRED ABSENCE OF OTHER SPECIFIED PARTS OF DIGESTIVE TRACT: Chronic | ICD-10-CM

## 2024-09-05 DIAGNOSIS — R94.31 ABNORMAL ELECTROCARDIOGRAM [ECG] [EKG]: ICD-10-CM

## 2024-09-05 DIAGNOSIS — R07.9 CHEST PAIN, UNSPECIFIED: ICD-10-CM

## 2024-09-05 DIAGNOSIS — C61 MALIGNANT NEOPLASM OF PROSTATE: ICD-10-CM

## 2024-09-05 DIAGNOSIS — Z86.79 PERSONAL HISTORY OF OTHER DISEASES OF THE CIRCULATORY SYSTEM: ICD-10-CM

## 2024-09-05 DIAGNOSIS — I45.10 UNSPECIFIED RIGHT BUNDLE-BRANCH BLOCK: ICD-10-CM

## 2024-09-05 PROCEDURE — 93000 ELECTROCARDIOGRAM COMPLETE: CPT

## 2024-09-05 PROCEDURE — 96365 THER/PROPH/DIAG IV INF INIT: CPT

## 2024-09-05 PROCEDURE — 99204 OFFICE O/P NEW MOD 45 MIN: CPT

## 2024-09-05 RX ORDER — ZOLEDRONIC ACID 5 MG/100ML
3 INJECTION, SOLUTION INTRAVENOUS ONCE
Refills: 0 | Status: COMPLETED | OUTPATIENT
Start: 2024-09-05 | End: 2024-09-05

## 2024-09-05 RX ORDER — METOPROLOL TARTRATE 100 MG/1
100 TABLET, FILM COATED ORAL
Qty: 2 | Refills: 0 | Status: ACTIVE | COMMUNITY
Start: 2024-09-05 | End: 1900-01-01

## 2024-09-05 RX ORDER — PNV NO.95/FERROUS FUM/FOLIC AC 28MG-0.8MG
TABLET ORAL
Refills: 0 | Status: ACTIVE | COMMUNITY

## 2024-09-05 RX ORDER — ZINC SULFATE 50(220)MG
CAPSULE ORAL
Refills: 0 | Status: ACTIVE | COMMUNITY

## 2024-09-05 RX ADMIN — ZOLEDRONIC ACID 3 MILLIGRAM(S): 5 INJECTION, SOLUTION INTRAVENOUS at 09:22

## 2024-09-06 DIAGNOSIS — C61 MALIGNANT NEOPLASM OF PROSTATE: ICD-10-CM

## 2024-09-07 NOTE — ASSESSMENT
[FreeTextEntry1] : Mr. CHARLY SULLIVAN is a 73 year old man with PMHx of Metastatic Prostate Cancer (Stage IV), HTN, HLD who is presenting for evaluation of dyspnea and chest pain. Her symptoms are suggestive of stable angina.  -Reviewed history, labs, CT, ECG -Order TTE to rule out structural changes -Order CCTA to rule out ischemia and evaluate for CAD. Order metoprolol tartrate 100 (2 tabs) to take the night before and morning of the CCTA. Defer exercise stress testing due to chronic pain.  -Continue atorvastatin 20 daily for HLD -Continue olmesartan 40 daily for well controlled HTN  Time in encounter, including face to face visit and time reviewing chart:  55 minutes  Mk Martinez MD, FACC Non-Invasive Cardiology [address], [address] Office: [phone]

## 2024-09-07 NOTE — ASSESSMENT
[FreeTextEntry1] : Mr. CHARLY SULLIVAN is a 73 year old man with PMHx of Metastatic Prostate Cancer (Stage IV), HTN, HLD who is presenting for evaluation of dyspnea and chest pain. Her symptoms are suggestive of stable angina.  -Reviewed history, labs, CT, ECG -Order TTE to rule out structural changes -Order CCTA to rule out ischemia and evaluate for CAD. Order metoprolol tartrate 100 (2 tabs) to take the night before and morning of the CCTA. Defer exercise stress testing due to chronic pain.  -Continue atorvastatin 20 daily for HLD -Continue olmesartan 40 daily for well controlled HTN  Time in encounter, including face to face visit and time reviewing chart:  55 minutes  Mk Martinez MD, FACC Non-Invasive Cardiology [address], [address] Office: [phone]

## 2024-09-07 NOTE — REASON FOR VISIT
[FreeTextEntry1] : Mr. CHARLY SULLIVAN is a 73 year old man with PMHx of Metastatic Prostate Cancer (Stage IV), HTN, HLD who is presenting for evaluation of dyspnea and chest pain. He reports having recent dyspnea with walking long distances or up flights of stairs. He also has some chest discomfort with exertion. The discomfort is substernal, and non-radiating.   He has no significant FHx of CVD. He does not smoke.   He is a longtime baseball umpire, and an avid [employer] fan.

## 2024-09-19 ENCOUNTER — APPOINTMENT (OUTPATIENT)
Dept: CARDIOLOGY | Facility: CLINIC | Age: 74
End: 2024-09-19
Payer: MEDICARE

## 2024-09-19 PROCEDURE — 93306 TTE W/DOPPLER COMPLETE: CPT

## 2024-09-23 ENCOUNTER — LABORATORY RESULT (OUTPATIENT)
Age: 74
End: 2024-09-23

## 2024-09-23 ENCOUNTER — OUTPATIENT (OUTPATIENT)
Dept: OUTPATIENT SERVICES | Facility: HOSPITAL | Age: 74
LOS: 1 days | End: 2024-09-23
Payer: MEDICARE

## 2024-09-23 ENCOUNTER — APPOINTMENT (OUTPATIENT)
Age: 74
End: 2024-09-23

## 2024-09-23 DIAGNOSIS — C61 MALIGNANT NEOPLASM OF PROSTATE: ICD-10-CM

## 2024-09-23 DIAGNOSIS — Z90.49 ACQUIRED ABSENCE OF OTHER SPECIFIED PARTS OF DIGESTIVE TRACT: Chronic | ICD-10-CM

## 2024-09-23 DIAGNOSIS — N21.0 CALCULUS IN BLADDER: Chronic | ICD-10-CM

## 2024-09-23 LAB
ALBUMIN SERPL ELPH-MCNC: 4.1 G/DL
ALP BLD-CCNC: 356 U/L
ALT SERPL-CCNC: 24 U/L
ANION GAP SERPL CALC-SCNC: 12 MMOL/L
AST SERPL-CCNC: 32 U/L
BILIRUB DIRECT SERPL-MCNC: 0.2 MG/DL
BILIRUB INDIRECT SERPL-MCNC: 0.3 MG/DL
BILIRUB SERPL-MCNC: 0.5 MG/DL
BUN SERPL-MCNC: 26 MG/DL
CALCIUM SERPL-MCNC: 8.5 MG/DL
CHLORIDE SERPL-SCNC: 110 MMOL/L
CO2 SERPL-SCNC: 22 MMOL/L
CREAT SERPL-MCNC: 1.3 MG/DL
EGFR: 58 ML/MIN/1.73M2
GLUCOSE SERPL-MCNC: 135 MG/DL
HCT VFR BLD CALC: 29.6 %
HGB BLD-MCNC: 9.7 G/DL
MCHC RBC-ENTMCNC: 30.2 PG
MCHC RBC-ENTMCNC: 32.8 G/DL
MCV RBC AUTO: 92.2 FL
PLATELET # BLD AUTO: 247 K/UL
PMV BLD: 9.6 FL
POTASSIUM SERPL-SCNC: 4.4 MMOL/L
PROT SERPL-MCNC: 6.1 G/DL
RBC # BLD: 3.21 M/UL
RBC # FLD: 17.1 %
SODIUM SERPL-SCNC: 144 MMOL/L
WBC # FLD AUTO: 6.67 K/UL

## 2024-09-23 PROCEDURE — 81206 BCR/ABL1 GENE MAJOR BP: CPT

## 2024-09-23 PROCEDURE — 85027 COMPLETE CBC AUTOMATED: CPT

## 2024-09-23 PROCEDURE — 80076 HEPATIC FUNCTION PANEL: CPT

## 2024-09-23 PROCEDURE — 36415 COLL VENOUS BLD VENIPUNCTURE: CPT

## 2024-09-23 PROCEDURE — 80048 BASIC METABOLIC PNL TOTAL CA: CPT

## 2024-09-23 PROCEDURE — 81207 BCR/ABL1 GENE MINOR BP: CPT

## 2024-09-23 PROCEDURE — 84153 ASSAY OF PSA TOTAL: CPT

## 2024-09-25 ENCOUNTER — OUTPATIENT (OUTPATIENT)
Dept: OUTPATIENT SERVICES | Facility: HOSPITAL | Age: 74
LOS: 1 days | Discharge: HOME | End: 2024-09-25
Payer: MEDICARE

## 2024-09-25 ENCOUNTER — TRANSCRIPTION ENCOUNTER (OUTPATIENT)
Age: 74
End: 2024-09-25

## 2024-09-25 ENCOUNTER — RESULT REVIEW (OUTPATIENT)
Age: 74
End: 2024-09-25

## 2024-09-25 VITALS
SYSTOLIC BLOOD PRESSURE: 128 MMHG | HEART RATE: 81 BPM | OXYGEN SATURATION: 99 % | RESPIRATION RATE: 17 BRPM | DIASTOLIC BLOOD PRESSURE: 69 MMHG | TEMPERATURE: 97 F

## 2024-09-25 VITALS
RESPIRATION RATE: 16 BRPM | TEMPERATURE: 98 F | OXYGEN SATURATION: 100 % | HEIGHT: 67 IN | SYSTOLIC BLOOD PRESSURE: 137 MMHG | WEIGHT: 151.9 LBS | HEART RATE: 100 BPM | DIASTOLIC BLOOD PRESSURE: 79 MMHG

## 2024-09-25 DIAGNOSIS — C61 MALIGNANT NEOPLASM OF PROSTATE: ICD-10-CM

## 2024-09-25 DIAGNOSIS — N21.0 CALCULUS IN BLADDER: Chronic | ICD-10-CM

## 2024-09-25 DIAGNOSIS — Z46.6 ENCOUNTER FOR FITTING AND ADJUSTMENT OF URINARY DEVICE: ICD-10-CM

## 2024-09-25 DIAGNOSIS — Z90.49 ACQUIRED ABSENCE OF OTHER SPECIFIED PARTS OF DIGESTIVE TRACT: Chronic | ICD-10-CM

## 2024-09-25 DIAGNOSIS — N13.5 CROSSING VESSEL AND STRICTURE OF URETER WITHOUT HYDRONEPHROSIS: ICD-10-CM

## 2024-09-25 LAB — PSA SERPL-MCNC: 0.08 NG/ML

## 2024-09-25 PROCEDURE — 50435 EXCHANGE NEPHROSTOMY CATH: CPT | Mod: LT

## 2024-09-25 PROCEDURE — C1729: CPT

## 2024-09-25 PROCEDURE — C1769: CPT

## 2024-09-25 NOTE — PRE-ANESTHESIA EVALUATION ADULT - NSANTHALCOHOLSD_GEN_ALL_CORE
Subjective:       Patient ID: Valencia Gupta is a 6 y.o. female.    Chief Complaint: Sore Throat (Enlarged tonsils. Mother states pt went to PCP last week and was told her tonsils are large. Mother also states pt snores loudly. )    Sore Throat  Associated symptoms include a sore throat.     Review of Systems   HENT:  Positive for sore throat.    All other systems reviewed and are negative.      Objective:      Physical Exam  General: NAD  Head: Normocephalic, atraumatic, no facial asymmetry/normal strength,  Ears: Both auricules normal in appearance, w/o deformities tympanic membranes normal external auditory canals normal  Nose: External nose w/o deformities normal turbinates no drainage or inflammation  Oral Cavity: Lips, gums, floor of mouth, tongue hard palate, and buccal mucosa without mass/lesion  Oropharynx: Mucosa pink and moist, soft palate, posterior pharynx and oropharyngeal wall without mass/lesion Tonsils 4 +   Neck: Supple, symmetric, trachea midline, no palpable mass/lesion, no palpable cervical lymphadenopathy  Skin: Warm and dry, no concerning lesions  Respiratory: Respirations even, unlabored   Assessment:       1. Chronic adenotonsillitis        Plan:       T & A in OR     
No

## 2024-09-25 NOTE — ASU DISCHARGE PLAN (ADULT/PEDIATRIC) - NS MD DC FALL RISK RISK
For information on Fall & Injury Prevention, visit: [email or web address] OR  [email or web address] OR  https://www.cdc.gov/steadi/patient.html

## 2024-09-25 NOTE — PRE PROCEDURE NOTE - PRE PROCEDURE EVALUATION
73 year old man with PMHx of Metastatic Prostate Cancer (Stage IV), HTN, HLD s/p left PCN placement for ureteral obstruction s/p last exchange on 6/26 presents today for routine exchange.     Plan for image guided left PCN exchange with conscious sedation / anesthesia today 9/25

## 2024-09-25 NOTE — ASU DISCHARGE PLAN (ADULT/PEDIATRIC) - ASU DC SPECIAL INSTRUCTIONSFT
follow up for a routine exchange in 3 months [phone]
need for outpatient follow-up/radiology results/return to ED if symptoms worsen, persist or questions arise

## 2024-09-26 LAB — T(9;22)(ABL1,BCR)/CONTROL BLD/T: NORMAL

## 2024-10-01 ENCOUNTER — RESULT REVIEW (OUTPATIENT)
Age: 74
End: 2024-10-01

## 2024-10-01 ENCOUNTER — OUTPATIENT (OUTPATIENT)
Dept: OUTPATIENT SERVICES | Facility: HOSPITAL | Age: 74
LOS: 1 days | End: 2024-10-01
Payer: MEDICARE

## 2024-10-01 DIAGNOSIS — Z00.8 ENCOUNTER FOR OTHER GENERAL EXAMINATION: ICD-10-CM

## 2024-10-01 DIAGNOSIS — Z90.49 ACQUIRED ABSENCE OF OTHER SPECIFIED PARTS OF DIGESTIVE TRACT: Chronic | ICD-10-CM

## 2024-10-01 DIAGNOSIS — N21.0 CALCULUS IN BLADDER: Chronic | ICD-10-CM

## 2024-10-01 DIAGNOSIS — R07.9 CHEST PAIN, UNSPECIFIED: ICD-10-CM

## 2024-10-01 PROCEDURE — 75574 CT ANGIO HRT W/3D IMAGE: CPT

## 2024-10-01 PROCEDURE — 75574 CT ANGIO HRT W/3D IMAGE: CPT | Mod: 26

## 2024-10-02 ENCOUNTER — OUTPATIENT (OUTPATIENT)
Dept: OUTPATIENT SERVICES | Facility: HOSPITAL | Age: 74
LOS: 1 days | End: 2024-10-02
Payer: MEDICARE

## 2024-10-02 ENCOUNTER — APPOINTMENT (OUTPATIENT)
Age: 74
End: 2024-10-02
Payer: MEDICARE

## 2024-10-02 ENCOUNTER — RESULT REVIEW (OUTPATIENT)
Age: 74
End: 2024-10-02

## 2024-10-02 VITALS
DIASTOLIC BLOOD PRESSURE: 70 MMHG | BODY MASS INDEX: 25.16 KG/M2 | TEMPERATURE: 97.8 F | SYSTOLIC BLOOD PRESSURE: 131 MMHG | HEART RATE: 64 BPM | WEIGHT: 156.56 LBS | HEIGHT: 66 IN | OXYGEN SATURATION: 100 %

## 2024-10-02 DIAGNOSIS — Z90.49 ACQUIRED ABSENCE OF OTHER SPECIFIED PARTS OF DIGESTIVE TRACT: Chronic | ICD-10-CM

## 2024-10-02 DIAGNOSIS — Z51.81 ENCOUNTER FOR THERAPEUTIC DRUG LVL MONITORING: ICD-10-CM

## 2024-10-02 DIAGNOSIS — C61 MALIGNANT NEOPLASM OF PROSTATE: ICD-10-CM

## 2024-10-02 DIAGNOSIS — Z79.818 ENCOUNTER FOR THERAPEUTIC DRUG LVL MONITORING: ICD-10-CM

## 2024-10-02 DIAGNOSIS — D64.9 ANEMIA, UNSPECIFIED: ICD-10-CM

## 2024-10-02 DIAGNOSIS — C79.51 MALIGNANT NEOPLASM OF PROSTATE: ICD-10-CM

## 2024-10-02 DIAGNOSIS — R07.9 CHEST PAIN, UNSPECIFIED: ICD-10-CM

## 2024-10-02 DIAGNOSIS — N21.0 CALCULUS IN BLADDER: Chronic | ICD-10-CM

## 2024-10-02 PROCEDURE — 99215 OFFICE O/P EST HI 40 MIN: CPT

## 2024-10-02 NOTE — BEGINNING OF VISIT
[0] : 2) Feeling down, depressed, or hopeless: Not at all (0) [PHQ-2 Negative] : PHQ-2 Negative [PHQ-9 Negative] : PHQ-9 Negative [Pain Scale: ___] : On a scale of 1-10, today the patient's pain is a(n) [unfilled]. [Never] : Never [Date Discussed (MM/DD/YY): ___] : Discussed: [unfilled] [Reviewed, no changes] : Reviewed, no changes

## 2024-10-02 NOTE — HISTORY OF PRESENT ILLNESS
[Therapy: ___] : Therapy: [unfilled] [Cycle: ___] : Cycle: [unfilled] [de-identified] : 72yo Male with pmh of BPH (on finasteride), HTN, HLD presented to the ER with lower abdominal pain and constipation for the past few days. The patient follows with Urologist Dr. Perez and private Oncologist in [place]. The patient mentioned that he is not aware of the prostate cancer diagnosis and was being managed for BPH by urology. He mentioned that he had a bladder stone removal in 2021 and had colonoscopy recently in Feb which showed 5 polyps. The patient has increase in urinary frequency and nocturia but denies any dysuria and attributes it to Finasteride. He has FHx ( 2 brothers) of prostate cancer. The patient mentioned that he would like to see his private oncologist rather than get treatment with oncology team here. The patient has been making urine and [place] was in ED as requested by urology and has mild hematuria (likely due to traumatic [place]).  CT A/P 3/8/24: KIDNEYS/PELVIC ORGANS: Left lateral urinary bladder wall masslike thickening, contiguous with prostate, with extravesicular tumor infiltration. Mass at region of left UVJ, with resultant moderate left hydroureteronephrosis. Urinary bladder distended. BPH with median lobe hypertrophy. Three urinary bladder intraluminal calculi, measuring up to 0.5 cm. Relation of prostate gland and rectum limited by modality. Right mild hydronephrosis or fullness. ABDOMINOPELVIC NODES: Suspicious pelvic lymphadenopathy, including left medial external iliac chain 2 x 1.6 cm lymph node, right internal iliac chain 1.9 x 1.2 cm lymph node, multiple presacral lymph nodes measuring up to 0.9 cm short axis, and perirectal lymph nodes measuring up to 1.5 x 1 cm.  PERITONEUM/MESENTERY/BOWEL: Post appendectomy. No bowel obstruction. No ascites. Colonic diverticulosis. Mild colonic stool burden..  BONES/SOFT TISSUES: Patchy faint sclerosis throughout visualized spine, bony pelvis and bilateral ribs, suspicious for osteoblastic metastatic bone disease.  OTHER: Vascular calcifications.   IMPRESSION:  1. Left lateral urinary bladder wall masslike thickening, contiguous with prostate, with extravesicular tumor infiltration. Mass at region of left UVJ, with resultant moderate left hydroureteronephrosis. Urinary bladder distended. Right mild hydronephrosis or fullness. BPH with median lobe hypertrophy. 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 Findings compatible with metastatic neoplasm; prostate cancer invading bladder favored over primary urinary bladder neoplasm given suspicious osteoblastic metastatic bone disease.. Correlation with PSA recommended.  2. Suspicious pelvic lymphadenopathy, catalogued above.  3. Mild colonic stool burden. No bowel obstruction. Relation of prostate gland and rectum limited by modality.     BONE SCAN 3/11/24 Extensive multifocal increased activity throughout the bones, compatible with diffuse osseous metastatic disease. There is multifocal increased activity in the bilateral ribs, right humeral head, left mid humerus, pelvis, bilateral femurs, thoracolumbar spine, and calvarium.  [FreeTextEntry1] : Taxotere on 4.18.2024 - 8.1.2024  [de-identified] : 4/8/24 Patient is here for a follow-up visit for metastatic prostate cancer, accompanied by son.  He is feeling well with no new complaints.  Reviewed pathology from recent left iliac bone biopsy which was diagnostic for metastatic carcinoma of the prostate.  He recently started Casodex 50mg once daily on 4.1.2024 as per recommendations from Urology.  Patient denies fever, chills, nausea, vomiting or dyspnea.  He reports hematuria.     5/9/24 Patient is here for a follow-up visit for metastatic prostate cancer, accompanied by spouse.  Patient started Lupron 22.5mg every 3 months on 4.18.2024.  Reviewed most recent CBC which shows moderate leukocytosis and anemia with hgb 9.1g/dL.  Most recent PSA was 6.16ng/mL from 04/2024.  He continues on Casodex 50mg once daily.  He has not had any more episodes of hematuria.   He is due for cycle 2 of Taxotere on 5/9, initiated 4.18.2024.  He reports dysgeusia and fatigue.  Patient denies fever, chills, nausea, vomiting or dyspnea.    5/30/24 Patient is here for a follow-up visit for metastatic prostate cancer, accompanied by spouse.  Patient started Lupron 22.5mg every 3 months on 4.18.2024.  Reviewed most recent CBC which shows moderate leukocytosis and anemia with hgb 9.1g/dL.  Most recent PSA was 6.16ng/mL from 04/2024.  He continues on Casodex 50mg once daily.  He has not had any more episodes of hematuria.   He is due for cycle 3 of Taxotere on 5/30, initiated 4.18.2024.  He reports dysgeusia and fatigue which has affected his appetite and results in weight loss since last visit.  Patient denies fever, chills, nausea, vomiting or dyspnea.    6/20/24 Patient is here for a follow-up visit for metastatic prostate cancer, accompanied by spouse.  Since last visit, patient started Nubeqa (Darolutamide) twice daily in 06/2024.  Patient started Lupron 22.5mg every 3 months on 4.18.2024.  Reviewed most recent CBC which shows severe anemia with hgb 7.8g/dL.  Most recent PSA was 0.38ng/mL from 06/2024.  He continues on Casodex 50mg once daily.  He has not had any more episodes of hematuria.   He is due for cycle 4 of Taxotere on 6/20, initiated 4.18.2024.  He states his energy has been good this past week but occasionally he becomes fatigued.  Patient denies fever, chills, dizziness, chest pain, palpitations, nausea, vomiting or dyspnea.    7/11/24 Patient is here for a follow-up visit for metastatic prostate cancer, accompanied by spouse.  Patient continues Nubeqa (Darolutamide) twice daily, initiated in 06/2024.  Patient started Lupron 22.5mg every 3 months on 4.18.2024.  Reviewed most recent CBC which shows severe anemia with hgb 9.7/dL.  Most recent PSA was 0.38ng/mL from 06/2024.  He continues on Casodex 50mg once daily.  He is due for cycle 5 of Taxotere on 7/11, initiated 4.18.2024.  Patient denies fever, chills, dizziness, chest pain, palpitations, nausea, vomiting or dyspnea.  Reviewed most recent PET/CT which redemonstrates known PSMA avid osseous lesions, and decreased in size of iliac chain lymph nodes.   PET/CT PSMA  (7.10.2024) IMPRESSION:Innumerable 2+ and 3+ PSMA avid osseous lesions throughout the skeleton and the prostate gland consistent with known metastatic malignancy.A total of three 2+ and 3+ right iliac chain lymph nodes are decreased in size since CT abdomen pelvis on 3/8/2024 (previously measuring up to 1.4 cm, currently subcentimeter on series 3 image 131 along the right inner pelvic sidewall and series 3 image 155).  8/1/24 Patient is here for a follow-up visit for metastatic prostate cancer, accompanied by spouse.  He is due for cycle 6 of Taxotere on 8/1, initiated 4.18.2024.  Patient continues Nubeqa (Darolutamide) twice daily, initiated in 06/2024.  He continues on Casodex 50mg once daily.  Patient started Lupron 22.5mg every 3 months on 4.18.2024.  Reviewed most recent CBC which shows moderate anemia with hgb 8.8g/dL.  Most recent PSA was 0.13ng/mL from 07/2024.  Patient denies fever, chills, dizziness, chest pain, palpitations, nausea, vomiting or dyspnea.    8/22/24 Patient is here for a follow-up visit for metastatic prostate cancer, accompanied by spouse.  He completed 6 cycles of Taxotere on 8.1.2024, initiated 4.18.2024.  Patient continues Nubeqa (Darolutamide) twice daily, initiated in 06/2024.  Patient is due for Lupron 22.5mg every 3 months in 10/2024, initiated on 4.18.2024.  Reviewed most recent CBC which shows moderate anemia with hgb 9.2g/dL.  Most recent PSA was down to 0.08ng/mL from 08/2024.  Patient denies fever, chills, dizziness, palpitations, nausea, vomiting or dyspnea.  He states over the past few months he has been experiencing chest tightness when he walks.  He has nephrostomy exchange on 9/25.    10/3/24 Patient is here for a follow-up visit for metastatic prostate cancer, accompanied by spouse.  He completed 6 cycles of Taxotere on 8.1.2024, initiated 4.18.2024.  Patient continues Nubeqa (Darolutamide) twice daily, initiated in 06/2024.  Patient is due for Lupron 22.5mg every 3 months today, initiated on 4.18.2024.  Reviewed most recent CBC which shows moderate anemia with hgb 9.7g/dL.  Most recent PSA was at 0.08ng/mL from 09/2024.  Patient denies fever, chills, dizziness, palpitations, nausea, vomiting or dyspnea.  He still experiences chest tightness occasionally when he walks; recently followed by cardiologist and underwent CTa Heart+ECHO.  He is s/p nephrostomy exchange on 9/25.

## 2024-10-02 NOTE — REVIEW OF SYSTEMS
[Fatigue] : fatigue [Recent Change In Weight] : ~T recent weight change [Lower Ext Edema] : lower extremity edema [Constipation] : constipation [Diarrhea: Grade 0] : Diarrhea: Grade 0 [Negative] : Allergic/Immunologic [FreeTextEntry2] : gained some weight [Fever] : no fever [Chills] : no chills [Night Sweats] : no night sweats [Eye Pain] : no eye pain [Dysphagia] : no dysphagia [Odynophagia] : no odynophagia [Chest Pain] : no chest pain [Palpitations] : no palpitations [Shortness Of Breath] : no shortness of breath [Abdominal Pain] : no abdominal pain [Dysuria] : no dysuria [Incontinence] : no incontinence [Joint Pain] : no joint pain [Skin Rash] : no skin rash [Confused] : no confusion [Suicidal] : not suicidal [FreeTextEntry5] : occasionally experiences chest tightness when he walks, resolves with rest  [FreeTextEntry8] : urine bag draining clear yellow  [de-identified] : reports mild change in sensation of feet starting around Cycle 3

## 2024-10-02 NOTE — HISTORY OF PRESENT ILLNESS
[Therapy: ___] : Therapy: [unfilled] [Cycle: ___] : Cycle: [unfilled] [de-identified] : 72yo Male with pmh of BPH (on finasteride), HTN, HLD presented to the ER with lower abdominal pain and constipation for the past few days. The patient follows with Urologist Dr. Perez and private Oncologist in [place]. The patient mentioned that he is not aware of the prostate cancer diagnosis and was being managed for BPH by urology. He mentioned that he had a bladder stone removal in 2021 and had colonoscopy recently in Feb which showed 5 polyps. The patient has increase in urinary frequency and nocturia but denies any dysuria and attributes it to Finasteride. He has FHx ( 2 brothers) of prostate cancer. The patient mentioned that he would like to see his private oncologist rather than get treatment with oncology team here. The patient has been making urine and [place] was in ED as requested by urology and has mild hematuria (likely due to traumatic [place]).  CT A/P 3/8/24: KIDNEYS/PELVIC ORGANS: Left lateral urinary bladder wall masslike thickening, contiguous with prostate, with extravesicular tumor infiltration. Mass at region of left UVJ, with resultant moderate left hydroureteronephrosis. Urinary bladder distended. BPH with median lobe hypertrophy. Three urinary bladder intraluminal calculi, measuring up to 0.5 cm. Relation of prostate gland and rectum limited by modality. Right mild hydronephrosis or fullness. ABDOMINOPELVIC NODES: Suspicious pelvic lymphadenopathy, including left medial external iliac chain 2 x 1.6 cm lymph node, right internal iliac chain 1.9 x 1.2 cm lymph node, multiple presacral lymph nodes measuring up to 0.9 cm short axis, and perirectal lymph nodes measuring up to 1.5 x 1 cm.  PERITONEUM/MESENTERY/BOWEL: Post appendectomy. No bowel obstruction. No ascites. Colonic diverticulosis. Mild colonic stool burden..  BONES/SOFT TISSUES: Patchy faint sclerosis throughout visualized spine, bony pelvis and bilateral ribs, suspicious for osteoblastic metastatic bone disease.  OTHER: Vascular calcifications.   IMPRESSION:  1. Left lateral urinary bladder wall masslike thickening, contiguous with prostate, with extravesicular tumor infiltration. Mass at region of left UVJ, with resultant moderate left hydroureteronephrosis. Urinary bladder distended. Right mild hydronephrosis or fullness. BPH with median lobe hypertrophy. 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 Findings compatible with metastatic neoplasm; prostate cancer invading bladder favored over primary urinary bladder neoplasm given suspicious osteoblastic metastatic bone disease.. Correlation with PSA recommended.  2. Suspicious pelvic lymphadenopathy, catalogued above.  3. Mild colonic stool burden. No bowel obstruction. Relation of prostate gland and rectum limited by modality.     BONE SCAN 3/11/24 Extensive multifocal increased activity throughout the bones, compatible with diffuse osseous metastatic disease. There is multifocal increased activity in the bilateral ribs, right humeral head, left mid humerus, pelvis, bilateral femurs, thoracolumbar spine, and calvarium.  [FreeTextEntry1] : Taxotere on 4.18.2024 - 8.1.2024  [de-identified] : 4/8/24 Patient is here for a follow-up visit for metastatic prostate cancer, accompanied by son.  He is feeling well with no new complaints.  Reviewed pathology from recent left iliac bone biopsy which was diagnostic for metastatic carcinoma of the prostate.  He recently started Casodex 50mg once daily on 4.1.2024 as per recommendations from Urology.  Patient denies fever, chills, nausea, vomiting or dyspnea.  He reports hematuria.     5/9/24 Patient is here for a follow-up visit for metastatic prostate cancer, accompanied by spouse.  Patient started Lupron 22.5mg every 3 months on 4.18.2024.  Reviewed most recent CBC which shows moderate leukocytosis and anemia with hgb 9.1g/dL.  Most recent PSA was 6.16ng/mL from 04/2024.  He continues on Casodex 50mg once daily.  He has not had any more episodes of hematuria.   He is due for cycle 2 of Taxotere on 5/9, initiated 4.18.2024.  He reports dysgeusia and fatigue.  Patient denies fever, chills, nausea, vomiting or dyspnea.    5/30/24 Patient is here for a follow-up visit for metastatic prostate cancer, accompanied by spouse.  Patient started Lupron 22.5mg every 3 months on 4.18.2024.  Reviewed most recent CBC which shows moderate leukocytosis and anemia with hgb 9.1g/dL.  Most recent PSA was 6.16ng/mL from 04/2024.  He continues on Casodex 50mg once daily.  He has not had any more episodes of hematuria.   He is due for cycle 3 of Taxotere on 5/30, initiated 4.18.2024.  He reports dysgeusia and fatigue which has affected his appetite and results in weight loss since last visit.  Patient denies fever, chills, nausea, vomiting or dyspnea.    6/20/24 Patient is here for a follow-up visit for metastatic prostate cancer, accompanied by spouse.  Since last visit, patient started Nubeqa (Darolutamide) twice daily in 06/2024.  Patient started Lupron 22.5mg every 3 months on 4.18.2024.  Reviewed most recent CBC which shows severe anemia with hgb 7.8g/dL.  Most recent PSA was 0.38ng/mL from 06/2024.  He continues on Casodex 50mg once daily.  He has not had any more episodes of hematuria.   He is due for cycle 4 of Taxotere on 6/20, initiated 4.18.2024.  He states his energy has been good this past week but occasionally he becomes fatigued.  Patient denies fever, chills, dizziness, chest pain, palpitations, nausea, vomiting or dyspnea.    7/11/24 Patient is here for a follow-up visit for metastatic prostate cancer, accompanied by spouse.  Patient continues Nubeqa (Darolutamide) twice daily, initiated in 06/2024.  Patient started Lupron 22.5mg every 3 months on 4.18.2024.  Reviewed most recent CBC which shows severe anemia with hgb 9.7/dL.  Most recent PSA was 0.38ng/mL from 06/2024.  He continues on Casodex 50mg once daily.  He is due for cycle 5 of Taxotere on 7/11, initiated 4.18.2024.  Patient denies fever, chills, dizziness, chest pain, palpitations, nausea, vomiting or dyspnea.  Reviewed most recent PET/CT which redemonstrates known PSMA avid osseous lesions, and decreased in size of iliac chain lymph nodes.   PET/CT PSMA  (7.10.2024) IMPRESSION:Innumerable 2+ and 3+ PSMA avid osseous lesions throughout the skeleton and the prostate gland consistent with known metastatic malignancy.A total of three 2+ and 3+ right iliac chain lymph nodes are decreased in size since CT abdomen pelvis on 3/8/2024 (previously measuring up to 1.4 cm, currently subcentimeter on series 3 image 131 along the right inner pelvic sidewall and series 3 image 155).  8/1/24 Patient is here for a follow-up visit for metastatic prostate cancer, accompanied by spouse.  He is due for cycle 6 of Taxotere on 8/1, initiated 4.18.2024.  Patient continues Nubeqa (Darolutamide) twice daily, initiated in 06/2024.  He continues on Casodex 50mg once daily.  Patient started Lupron 22.5mg every 3 months on 4.18.2024.  Reviewed most recent CBC which shows moderate anemia with hgb 8.8g/dL.  Most recent PSA was 0.13ng/mL from 07/2024.  Patient denies fever, chills, dizziness, chest pain, palpitations, nausea, vomiting or dyspnea.    8/22/24 Patient is here for a follow-up visit for metastatic prostate cancer, accompanied by spouse.  He completed 6 cycles of Taxotere on 8.1.2024, initiated 4.18.2024.  Patient continues Nubeqa (Darolutamide) twice daily, initiated in 06/2024.  Patient is due for Lupron 22.5mg every 3 months in 10/2024, initiated on 4.18.2024.  Reviewed most recent CBC which shows moderate anemia with hgb 9.2g/dL.  Most recent PSA was down to 0.08ng/mL from 08/2024.  Patient denies fever, chills, dizziness, palpitations, nausea, vomiting or dyspnea.  He states over the past few months he has been experiencing chest tightness when he walks.  He has nephrostomy exchange on 9/25.    10/3/24 Patient is here for a follow-up visit for metastatic prostate cancer, accompanied by spouse.  He completed 6 cycles of Taxotere on 8.1.2024, initiated 4.18.2024.  Patient continues Nubeqa (Darolutamide) twice daily, initiated in 06/2024.  Patient is due for Lupron 22.5mg every 3 months today, initiated on 4.18.2024.  Reviewed most recent CBC which shows moderate anemia with hgb 9.7g/dL.  Most recent PSA was at 0.08ng/mL from 09/2024.  Patient denies fever, chills, dizziness, palpitations, nausea, vomiting or dyspnea.  He still experiences chest tightness occasionally when he walks; recently followed by cardiologist and underwent CTa Heart+ECHO.  He is s/p nephrostomy exchange on 9/25.

## 2024-10-02 NOTE — HISTORY OF PRESENT ILLNESS
[Therapy: ___] : Therapy: [unfilled] [Cycle: ___] : Cycle: [unfilled] [de-identified] : 72yo Male with pmh of BPH (on finasteride), HTN, HLD presented to the ER with lower abdominal pain and constipation for the past few days. The patient follows with Urologist Dr. Perez and private Oncologist in [place]. The patient mentioned that he is not aware of the prostate cancer diagnosis and was being managed for BPH by urology. He mentioned that he had a bladder stone removal in 2021 and had colonoscopy recently in Feb which showed 5 polyps. The patient has increase in urinary frequency and nocturia but denies any dysuria and attributes it to Finasteride. He has FHx ( 2 brothers) of prostate cancer. The patient mentioned that he would like to see his private oncologist rather than get treatment with oncology team here. The patient has been making urine and Hensley was in ED as requested by urology and has mild hematuria (likely due to traumatic H[place]).  CT A/P 3/8/24: KIDNEYS/PELVIC ORGANS: Left lateral urinary bladder wall masslike thickening, contiguous with prostate, with extravesicular tumor infiltration. Mass at region of left UVJ, with resultant moderate left hydroureteronephrosis. Urinary bladder distended. BPH with median lobe hypertrophy. Three urinary bladder intraluminal calculi, measuring up to 0.5 cm. Relation of prostate gland and rectum limited by modality. Right mild hydronephrosis or fullness. ABDOMINOPELVIC NODES: Suspicious pelvic lymphadenopathy, including left medial external iliac chain 2 x 1.6 cm lymph node, right internal iliac chain 1.9 x 1.2 cm lymph node, multiple presacral lymph nodes measuring up to 0.9 cm short axis, and perirectal lymph nodes measuring up to 1.5 x 1 cm.  PERITONEUM/MESENTERY/BOWEL: Post appendectomy. No bowel obstruction. No ascites. Colonic diverticulosis. Mild colonic stool burden..  BONES/SOFT TISSUES: Patchy faint sclerosis throughout visualized spine, bony pelvis and bilateral ribs, suspicious for osteoblastic metastatic bone disease.  OTHER: Vascular calcifications.   IMPRESSION:  1. Left lateral urinary bladder wall masslike thickening, contiguous with prostate, with extravesicular tumor infiltration. Mass at region of left UVJ, with resultant moderate left hydroureteronephrosis. Urinary bladder distended. Right mild hydronephrosis or fullness. BPH with median lobe hypertrophy. 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 Findings compatible with metastatic neoplasm; prostate cancer invading bladder favored over primary urinary bladder neoplasm given suspicious osteoblastic metastatic bone disease.. Correlation with PSA recommended.  2. Suspicious pelvic lymphadenopathy, catalogued above.  3. Mild colonic stool burden. No bowel obstruction. Relation of prostate gland and rectum limited by modality.     BONE SCAN 3/11/24 Extensive multifocal increased activity throughout the bones, compatible with diffuse osseous metastatic disease. There is multifocal increased activity in the bilateral ribs, right humeral head, left mid humerus, pelvis, bilateral femurs, thoracolumbar spine, and calvarium.  [FreeTextEntry1] : Taxotere on 4.18.2024 - 8.1.2024  [de-identified] : 4/8/24 Patient is here for a follow-up visit for metastatic prostate cancer, accompanied by son.  He is feeling well with no new complaints.  Reviewed pathology from recent left iliac bone biopsy which was diagnostic for metastatic carcinoma of the prostate.  He recently started Casodex 50mg once daily on 4.1.2024 as per recommendations from Urology.  Patient denies fever, chills, nausea, vomiting or dyspnea.  He reports hematuria.     5/9/24 Patient is here for a follow-up visit for metastatic prostate cancer, accompanied by spouse.  Patient started Lupron 22.5mg every 3 months on 4.18.2024.  Reviewed most recent CBC which shows moderate leukocytosis and anemia with hgb 9.1g/dL.  Most recent PSA was 6.16ng/mL from 04/2024.  He continues on Casodex 50mg once daily.  He has not had any more episodes of hematuria.   He is due for cycle 2 of Taxotere on 5/9, initiated 4.18.2024.  He reports dysgeusia and fatigue.  Patient denies fever, chills, nausea, vomiting or dyspnea.    5/30/24 Patient is here for a follow-up visit for metastatic prostate cancer, accompanied by spouse.  Patient started Lupron 22.5mg every 3 months on 4.18.2024.  Reviewed most recent CBC which shows moderate leukocytosis and anemia with hgb 9.1g/dL.  Most recent PSA was 6.16ng/mL from 04/2024.  He continues on Casodex 50mg once daily.  He has not had any more episodes of hematuria.   He is due for cycle 3 of Taxotere on 5/30, initiated 4.18.2024.  He reports dysgeusia and fatigue which has affected his appetite and results in weight loss since last visit.  Patient denies fever, chills, nausea, vomiting or dyspnea.    6/20/24 Patient is here for a follow-up visit for metastatic prostate cancer, accompanied by spouse.  Since last visit, patient started Nubeqa (Darolutamide) twice daily in 06/2024.  Patient started Lupron 22.5mg every 3 months on 4.18.2024.  Reviewed most recent CBC which shows severe anemia with hgb 7.8g/dL.  Most recent PSA was 0.38ng/mL from 06/2024.  He continues on Casodex 50mg once daily.  He has not had any more episodes of hematuria.   He is due for cycle 4 of Taxotere on 6/20, initiated 4.18.2024.  He states his energy has been good this past week but occasionally he becomes fatigued.  Patient denies fever, chills, dizziness, chest pain, palpitations, nausea, vomiting or dyspnea.    7/11/24 Patient is here for a follow-up visit for metastatic prostate cancer, accompanied by spouse.  Patient continues Nubeqa (Darolutamide) twice daily, initiated in 06/2024.  Patient started Lupron 22.5mg every 3 months on 4.18.2024.  Reviewed most recent CBC which shows severe anemia with hgb 9.7/dL.  Most recent PSA was 0.38ng/mL from 06/2024.  He continues on Casodex 50mg once daily.  He is due for cycle 5 of Taxotere on 7/11, initiated 4.18.2024.  Patient denies fever, chills, dizziness, chest pain, palpitations, nausea, vomiting or dyspnea.  Reviewed most recent PET/CT which redemonstrates known PSMA avid osseous lesions, and decreased in size of iliac chain lymph nodes.   PET/CT PSMA  (7.10.2024) IMPRESSION:Innumerable 2+ and 3+ PSMA avid osseous lesions throughout the skeleton and the prostate gland consistent with known metastatic malignancy.A total of three 2+ and 3+ right iliac chain lymph nodes are decreased in size since CT abdomen pelvis on 3/8/2024 (previously measuring up to 1.4 cm, currently subcentimeter on series 3 image 131 along the right inner pelvic sidewall and series 3 image 155).  8/1/24 Patient is here for a follow-up visit for metastatic prostate cancer, accompanied by spouse.  He is due for cycle 6 of Taxotere on 8/1, initiated 4.18.2024.  Patient continues Nubeqa (Darolutamide) twice daily, initiated in 06/2024.  He continues on Casodex 50mg once daily.  Patient started Lupron 22.5mg every 3 months on 4.18.2024.  Reviewed most recent CBC which shows moderate anemia with hgb 8.8g/dL.  Most recent PSA was 0.13ng/mL from 07/2024.  Patient denies fever, chills, dizziness, chest pain, palpitations, nausea, vomiting or dyspnea.    8/22/24 Patient is here for a follow-up visit for metastatic prostate cancer, accompanied by spouse.  He completed 6 cycles of Taxotere on 8.1.2024, initiated 4.18.2024.  Patient continues Nubeqa (Darolutamide) twice daily, initiated in 06/2024.  Patient is due for Lupron 22.5mg every 3 months in 10/2024, initiated on 4.18.2024.  Reviewed most recent CBC which shows moderate anemia with hgb 9.2g/dL.  Most recent PSA was down to 0.08ng/mL from 08/2024.  Patient denies fever, chills, dizziness, palpitations, nausea, vomiting or dyspnea.  He states over the past few months he has been experiencing chest tightness when he walks.  He has nephrostomy exchange on 9/25.    10/3/24 Patient is here for a follow-up visit for metastatic prostate cancer, accompanied by spouse.  He completed 6 cycles of Taxotere on 8.1.2024, initiated 4.18.2024.  Patient continues Nubeqa (Darolutamide) twice daily, initiated in 06/2024.  Patient is due for Lupron 22.5mg every 3 months today, initiated on 4.18.2024.  Reviewed most recent CBC which shows moderate anemia with hgb 9.7g/dL.  Most recent PSA was at 0.08ng/mL from 09/2024.  Patient denies fever, chills, dizziness, palpitations, nausea, vomiting or dyspnea.  He still experiences chest tightness occasionally when he walks; recently followed by cardiologist and underwent CTa Heart+ECHO.  He is s/p nephrostomy exchange on 9/25.

## 2024-10-02 NOTE — ASSESSMENT
[FreeTextEntry1] : 72 YO man, non smoker, was evaluated inpatient for abdomen pain and found to have pelvic mass (see CT scan below), with lymphadenopathy and bone masses.   # Metastatic Prostate Cancer, Stage IV, prognostic grade group 5, [place] score 10 (5+5), dx 03/2024  - s/p left iliac bone biopsy with IR on 3.15.2024 which was diagnostic for metastatic carcinoma of the prostate - s/p TURP 3/29/24  - Perineural invasion is identified. - Rare focus suspicious for lymphovascular invasion. - reviewed radiology, pathology and lab workup and had a discussion regarding implications of diagnosis, prognosis and options for management including but not limited to chemotherapy + ADT  - STOPPED Casodex 50mg once daily, initiated 4.1.2024 - 05/2024  - PET PSMA done following 4 cycles of chemotherapy 7.10.2024 redemonstrates known PSMA avid osseous lesions, and decreased in size of iliac chain lymph nodes.   - c/w Lupron 22.5mg every 12 weeks on 7/11, initiated 4.18.2024, next due 10/2024  - c/w cycle 6 out of 6 of Taxotere on 8/1, initiated 4.18.2024 - 8.1.2024  - c/w Zometa IV every 4 weeks to preserve bone strength and prevent fracture; risks vs benefits discussed, dental clearance obtained.  He will continue Calcium as recommended.  - c/w Darolutamide 600mg every 12 hours , initiated 06/2024  - recommend to repeat PET/CT PSMA in 12/2024 or sooner based on symptomology , Rx given  - followup with URO, Dr. 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 - no longer experiencing hematuria  - iron studies adequate from 04/2024  - s/p 2 units PRBC in 06/2024  - will continue to monitor   RTC in 3 months with CBC, BMP, LFTs, PSA level 2 days prior   seen/ examined w/ NP Vanessa; note reviewed; case discussed; agree w/ plan  continue ADT completed  taxotere 6; cycles had long discsussion with radiology regarding PET scan results": there is clearly dramatic bone involvement; can not state if disease progressed or not since this is the first PET; he is not symptomatic; will continue the same therapy; will repeat PET as above in NP note added Darolutamide beginning 5/30/2024 get PSA

## 2024-10-02 NOTE — REVIEW OF SYSTEMS
[Fatigue] : fatigue [Recent Change In Weight] : ~T recent weight change [Lower Ext Edema] : lower extremity edema [Constipation] : constipation [Diarrhea: Grade 0] : Diarrhea: Grade 0 [Negative] : Allergic/Immunologic [FreeTextEntry2] : gained some weight [Fever] : no fever [Chills] : no chills [Night Sweats] : no night sweats [Eye Pain] : no eye pain [Dysphagia] : no dysphagia [Odynophagia] : no odynophagia [Chest Pain] : no chest pain [Palpitations] : no palpitations [Shortness Of Breath] : no shortness of breath [Abdominal Pain] : no abdominal pain [Dysuria] : no dysuria [Incontinence] : no incontinence [Joint Pain] : no joint pain [Skin Rash] : no skin rash [Confused] : no confusion [Suicidal] : not suicidal [FreeTextEntry5] : occasionally experiences chest tightness when he walks, resolves with rest  [FreeTextEntry8] : urine bag draining clear yellow  [de-identified] : reports mild change in sensation of feet starting around Cycle 3

## 2024-10-02 NOTE — REVIEW OF SYSTEMS
[Fatigue] : fatigue [Recent Change In Weight] : ~T recent weight change [Lower Ext Edema] : lower extremity edema [Constipation] : constipation [Diarrhea: Grade 0] : Diarrhea: Grade 0 [Negative] : Allergic/Immunologic [FreeTextEntry2] : gained some weight [Fever] : no fever [Chills] : no chills [Night Sweats] : no night sweats [Eye Pain] : no eye pain [Dysphagia] : no dysphagia [Odynophagia] : no odynophagia [Chest Pain] : no chest pain [Palpitations] : no palpitations [Shortness Of Breath] : no shortness of breath [Abdominal Pain] : no abdominal pain [Dysuria] : no dysuria [Incontinence] : no incontinence [Joint Pain] : no joint pain [Skin Rash] : no skin rash [Confused] : no confusion [Suicidal] : not suicidal [FreeTextEntry5] : occasionally experiences chest tightness when he walks, resolves with rest  [FreeTextEntry8] : urine bag draining clear yellow  [de-identified] : reports mild change in sensation of feet starting around Cycle 3

## 2024-10-02 NOTE — PHYSICAL EXAM
[Restricted in physically strenuous activity but ambulatory and able to carry out work of a light or sedentary nature] : Status 1- Restricted in physically strenuous activity but ambulatory and able to carry out work of a light or sedentary nature, e.g., light house work, office work [Normal] : affect appropriate [de-identified] : trace bilateral ankle edema

## 2024-10-02 NOTE — ASSESSMENT
[FreeTextEntry1] : 72 YO man, non smoker, was evaluated inpatient for abdomen pain and found to have pelvic mass (see CT scan below), with lymphadenopathy and bone masses.   # Metastatic Prostate Cancer, Stage IV, prognostic grade group 5, [place] score 10 (5+5), dx 03/2024  - s/p left iliac bone biopsy with IR on 3.15.2024 which was diagnostic for metastatic carcinoma of the prostate - s/p TURP 3/29/24  - Perineural invasion is identified. - Rare focus suspicious for lymphovascular invasion. - reviewed radiology, pathology and lab workup and had a discussion regarding implications of diagnosis, prognosis and options for management including but not limited to chemotherapy + ADT  - STOPPED Casodex 50mg once daily, initiated 4.1.2024 - 05/2024  - PET PSMA done following 4 cycles of chemotherapy 7.10.2024 redemonstrates known PSMA avid osseous lesions, and decreased in size of iliac chain lymph nodes.   - c/w Lupron 22.5mg every 12 weeks on 7/11, initiated 4.18.2024, next due 10/2024  - c/w cycle 6 out of 6 of Taxotere on 8/1, initiated 4.18.2024 - 8.1.2024  - c/w Zometa IV every 4 weeks to preserve bone strength and prevent fracture; risks vs benefits discussed, dental clearance obtained.  He will continue Calcium as recommended.  - c/w Darolutamide 600mg every 12 hours , initiated 06/2024  - recommend to repeat PET/CT PSMA in 12/2024 or sooner based on symptomology , Rx given  - followup with URO, Dr. 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 - no longer experiencing hematuria  - iron studies adequate from 04/2024  - s/p 2 units PRBC in 06/2024  - will continue to monitor   RTC in 3 months with CBC, BMP, LFTs, PSA level 2 days prior   seen/ examined w/ NP Vanessa; note reviewed; case discussed; agree w/ plan  continue ADT completed  taxotere 6; cycles had long discsussion with radiology regarding PET scan results": there is clearly dramatic bone involvement; can not state if disease progressed or not since this is the first PET; he is not symptomatic; will continue the same therapy; will repeat PET as above in NP note added Darolutamide beginning 5/30/2024 get PSA

## 2024-10-02 NOTE — PHYSICAL EXAM
[Restricted in physically strenuous activity but ambulatory and able to carry out work of a light or sedentary nature] : Status 1- Restricted in physically strenuous activity but ambulatory and able to carry out work of a light or sedentary nature, e.g., light house work, office work [Normal] : affect appropriate [de-identified] : trace bilateral ankle edema

## 2024-10-02 NOTE — PHYSICAL EXAM
[Restricted in physically strenuous activity but ambulatory and able to carry out work of a light or sedentary nature] : Status 1- Restricted in physically strenuous activity but ambulatory and able to carry out work of a light or sedentary nature, e.g., light house work, office work [Normal] : affect appropriate [de-identified] : trace bilateral ankle edema

## 2024-10-02 NOTE — ASSESSMENT
[FreeTextEntry1] : 74 YO man, non smoker, was evaluated inpatient for abdomen pain and found to have pelvic mass (see CT scan below), with lymphadenopathy and bone masses.   # Metastatic Prostate Cancer, Stage IV, prognostic grade group 5, [place] score 10 (5+5), dx 03/2024  - s/p left iliac bone biopsy with IR on 3.15.2024 which was diagnostic for metastatic carcinoma of the prostate - s/p TURP 3/29/24  - Perineural invasion is identified. - Rare focus suspicious for lymphovascular invasion. - reviewed radiology, pathology and lab workup and had a discussion regarding implications of diagnosis, prognosis and options for management including but not limited to chemotherapy + ADT  - STOPPED Casodex 50mg once daily, initiated 4.1.2024 - 05/2024  - PET PSMA done following 4 cycles of chemotherapy 7.10.2024 redemonstrates known PSMA avid osseous lesions, and decreased in size of iliac chain lymph nodes.   - c/w Lupron 22.5mg every 12 weeks on 7/11, initiated 4.18.2024, next due 10/2024  - c/w cycle 6 out of 6 of Taxotere on 8/1, initiated 4.18.2024 - 8.1.2024  - c/w Zometa IV every 4 weeks to preserve bone strength and prevent fracture; risks vs benefits discussed, dental clearance obtained.  He will continue Calcium as recommended.  - c/w Darolutamide 600mg every 12 hours , initiated 06/2024  - recommend to repeat PET/CT PSMA in 12/2024 or sooner based on symptomology , Rx given  - followup with URO, Dr. 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 - no longer experiencing hematuria  - iron studies adequate from 04/2024  - s/p 2 units PRBC in 06/2024  - will continue to monitor   RTC in 3 months with CBC, BMP, LFTs, PSA level 2 days prior   seen/ examined w/ NP Vanessa; note reviewed; case discussed; agree w/ plan  continue ADT completed  taxotere 6; cycles had long discsussion with radiology regarding PET scan results": there is clearly dramatic bone involvement; can not state if disease progressed or not since this is the first PET; he is not symptomatic; will continue the same therapy; will repeat PET as above in NP note added Darolutamide beginning 5/30/2024 get PSA

## 2024-10-03 ENCOUNTER — APPOINTMENT (OUTPATIENT)
Age: 74
End: 2024-10-03

## 2024-10-03 ENCOUNTER — OUTPATIENT (OUTPATIENT)
Dept: OUTPATIENT SERVICES | Facility: HOSPITAL | Age: 74
LOS: 1 days | End: 2024-10-03
Payer: MEDICARE

## 2024-10-03 DIAGNOSIS — D64.9 ANEMIA, UNSPECIFIED: ICD-10-CM

## 2024-10-03 DIAGNOSIS — N21.0 CALCULUS IN BLADDER: Chronic | ICD-10-CM

## 2024-10-03 DIAGNOSIS — Z90.49 ACQUIRED ABSENCE OF OTHER SPECIFIED PARTS OF DIGESTIVE TRACT: Chronic | ICD-10-CM

## 2024-10-03 PROCEDURE — 96402 CHEMO HORMON ANTINEOPL SQ/IM: CPT

## 2024-10-03 PROCEDURE — 96365 THER/PROPH/DIAG IV INF INIT: CPT

## 2024-10-03 RX ORDER — LEUPROLIDE ACETATE 7.5 MG
22.5 SYRINGE (EA) INTRAMUSCULAR ONCE
Refills: 0 | Status: COMPLETED | OUTPATIENT
Start: 2024-10-03 | End: 2024-10-03

## 2024-10-03 RX ORDER — ZOLEDRONIC ACID 5 MG/100ML
3.5 INJECTION, SOLUTION INTRAVENOUS ONCE
Refills: 0 | Status: COMPLETED | OUTPATIENT
Start: 2024-10-03 | End: 2024-10-03

## 2024-10-03 RX ADMIN — Medication 22.5 MILLIGRAM(S): at 11:20

## 2024-10-03 RX ADMIN — ZOLEDRONIC ACID 3.5 MILLIGRAM(S): 5 INJECTION, SOLUTION INTRAVENOUS at 11:20

## 2024-10-03 RX ADMIN — ZOLEDRONIC ACID 3.5 MILLIGRAM(S): 5 INJECTION, SOLUTION INTRAVENOUS at 11:50

## 2024-10-10 ENCOUNTER — APPOINTMENT (OUTPATIENT)
Dept: INTERVENTIONAL RADIOLOGY/VASCULAR | Facility: CLINIC | Age: 74
End: 2024-10-10
Payer: MEDICARE

## 2024-10-10 PROCEDURE — 99212 OFFICE O/P EST SF 10 MIN: CPT

## 2024-10-11 ENCOUNTER — RESULT REVIEW (OUTPATIENT)
Age: 74
End: 2024-10-11

## 2024-10-11 ENCOUNTER — TRANSCRIPTION ENCOUNTER (OUTPATIENT)
Age: 74
End: 2024-10-11

## 2024-10-11 ENCOUNTER — OUTPATIENT (OUTPATIENT)
Dept: OUTPATIENT SERVICES | Facility: HOSPITAL | Age: 74
LOS: 1 days | Discharge: HOME | End: 2024-10-11
Payer: MEDICARE

## 2024-10-11 VITALS
TEMPERATURE: 98 F | HEIGHT: 67 IN | HEART RATE: 99 BPM | RESPIRATION RATE: 16 BRPM | SYSTOLIC BLOOD PRESSURE: 110 MMHG | OXYGEN SATURATION: 99 % | DIASTOLIC BLOOD PRESSURE: 74 MMHG | WEIGHT: 154.98 LBS

## 2024-10-11 VITALS
RESPIRATION RATE: 16 BRPM | DIASTOLIC BLOOD PRESSURE: 74 MMHG | HEART RATE: 99 BPM | SYSTOLIC BLOOD PRESSURE: 110 MMHG | TEMPERATURE: 98 F | OXYGEN SATURATION: 99 % | HEIGHT: 67 IN | WEIGHT: 154.98 LBS

## 2024-10-11 DIAGNOSIS — C79.82 SECONDARY MALIGNANT NEOPLASM OF GENITAL ORGANS: ICD-10-CM

## 2024-10-11 DIAGNOSIS — C80.1 MALIGNANT (PRIMARY) NEOPLASM, UNSPECIFIED: ICD-10-CM

## 2024-10-11 DIAGNOSIS — N13.9 OBSTRUCTIVE AND REFLUX UROPATHY, UNSPECIFIED: ICD-10-CM

## 2024-10-11 DIAGNOSIS — Z90.49 ACQUIRED ABSENCE OF OTHER SPECIFIED PARTS OF DIGESTIVE TRACT: Chronic | ICD-10-CM

## 2024-10-11 DIAGNOSIS — N21.0 CALCULUS IN BLADDER: Chronic | ICD-10-CM

## 2024-10-11 DIAGNOSIS — T83.022A DISPLACEMENT OF NEPHROSTOMY CATHETER, INITIAL ENCOUNTER: ICD-10-CM

## 2024-10-11 DIAGNOSIS — N13.30 UNSPECIFIED HYDRONEPHROSIS: ICD-10-CM

## 2024-10-11 PROCEDURE — 50435 EXCHANGE NEPHROSTOMY CATH: CPT | Mod: LT

## 2024-10-11 PROCEDURE — C1769: CPT

## 2024-10-11 PROCEDURE — C1729: CPT

## 2024-10-11 PROCEDURE — C1887: CPT

## 2024-10-11 RX ORDER — DAROLUTAMIDE 300 MG/1
2 TABLET, FILM COATED ORAL
Refills: 0 | DISCHARGE

## 2024-10-11 NOTE — ASU DISCHARGE PLAN (ADULT/PEDIATRIC) - NS MD DC FALL RISK RISK
For information on Fall & Injury Prevention, visit: [email or web address] OR  [email or web address] OR  https://www.cdc.gov/steadi/patient.html

## 2024-10-11 NOTE — PROGRESS NOTE ADULT - SUBJECTIVE AND OBJECTIVE BOX
INTERVENTIONAL RADIOLOGY BRIEF-OPERATIVE NOTE    Procedure:    1.  Left nephrostogram  2.  Fluoro-directed guidewire exchange of left nephrostomy catheter    Pre-Op Diagnosis:  Distal left ureteral obstruction    Post-Op Diagnosis:  Same    Attending:  Giana  Resident:   None    Anesthesia (type):  [ ] General Anesthesia  [ ] Sedation  [ ] Spinal Anesthesia  [X] Local/Regional    Contrast:   [place], 20 cc to left renal collecting system, drained    Estimated Blood Loss:  1 cc    Condition:   [ ] Critical  [ ] Serious  [ ] Fair   [X] Good    Findings/Follow up Plan of Care:  Original catheter dislodged, outside of left kidney but in communication with the renal collecting system.  The catheter takes a circuitous course just outside the kidney which does not [id number], despite utilization of soft and stiff guidewires.  Catheter exchange performed with some moderate discomfort.  New 8-[place] catheter pigtail is in the left renal pelvis.  Patient tolerated well.    Specimens Removed:  None    Implants:  None    Complications:  None immediate    Disposition:  D/C home.  F/u [facility].  Recommend patient get iv conscious sedation for future catheter exchanges unless he elects for a new tube.      Please call Interventional Radiology [phone] with any questions, concerns, or issues.

## 2024-10-11 NOTE — ASU PREOP CHECKLIST - SPO2 (%)
HPI:  Juana Garcia is a 63 year old female who presents today  complaining of episode of tachy, sob sweaty x 15- 20 minutes mon night she was sitting reading.   since then a couple episodes of palps at rest  No exertional sx  No stimulants used    Allergy and  Meds reviewed today.      PHYSICAL EXAM   weight is 72.2 kg. Her blood pressure is 126/86 and her pulse is 80.  body mass index is 27.75 kg/m².    GENERAL:  Alert oriented well groomed patient in no apparent distress  NECK:  carotid  pulses 2+ equal bilaterally, without bruits  supple and nontender, without significant adenopathy,or thyromegaly  LUNG:  CTA anteriorly and posteriorly, without rales, rhonchi or wheezing  Normal chest wall excursion  COR:  Regular rate and rhythm, normal S1 and S2 without murmurs, gallops or rubs  EXT:  without erythema, edema or cords bilaterally, the skin is warm and without rash, venous stasis changes or petechiae, no significant varicosities    IMPRESSION/PLAN:  Tachycardia  - CARDIAC EVENT MONITOR; Future  - ECHO STRESS; Future  - STRESS TEST; Future  - THYROID STIMULATING HORMONE; Future  - MAGNESIUM LEVEL; Future    Dyspnea, unspecified type  - ECHO STRESS; Future  - STRESS TEST; Future    Heart palpitations  - ELECTROCARDIOGRAM 12-LEAD; Future  - ELECTROCARDIOGRAM 12-LEAD  - THYROID STIMULATING HORMONE; Future  - MAGNESIUM LEVEL; Future      Patient Instructions   Call [facility]  [phone] to schedule stress test and after that event monitor    Labs today    Follow up on results          Return in about 4 weeks (around 3/7/2018) for follow up.    99

## 2024-10-11 NOTE — ASU PATIENT PROFILE, ADULT - NSICDXPASTMEDICALHX_GEN_ALL_CORE_FT
PAST MEDICAL HISTORY:  Bladder stones     BPH (benign prostatic hyperplasia)     High cholesterol     Hypertension     Prostate cancer

## 2024-10-11 NOTE — PRE-ANESTHESIA EVALUATION ADULT - WEIGHT IN KG
[FreeTextEntry1] : I have been following her since September of 2015. At that time she had an episode of left facial and left arm numbness. Workup for stroke including brain MRI was negative. She was put on Plavix. She is allergic to aspirin. She has had no further symptoms.   Medical history significant for hypertension, hypothyroid, diabetes.  She presented 9/21/20 with a new problem. She says she has been a little forgetful over the last month or 2. She will forget where she places things at times. She has no trouble carrying out activities of daily living. She drives without problem. 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 Brain MRA was negative.  She subsequently had an EEG which was normal. She got check of thyroid functions and B12 with her primary care doctor she says those were fine. Feels her memory has remained stable.   She returned, 7/22/2022.  Says she felt her memory is declining and her family concurs with that.  I started her on donepezil 5 mg a day.  Subsequently is it was increased to 10 mg a day She was getting some mild nocturnal headaches so she stopped the donepezil but the headaches did not change She is back on donepezil 10 mg a day Her  felt her memory was declining She does have a fair amount of anxiety as well. We added memantine and built up to the therapeutic 10 mg twice daily dose She has remained fairly stable.  She returns today, 4/30 /24 accompanied by her  with a new problem For the last few weeks she gets sharp pains in her head that are quite concerning to her They begin posteriorly and then will tend to migrate They will last for a few seconds and she gets a few times a week There is no associated visual disturbance caused nausea, vomiting, vertigo or lightheadedness Denies any neck pain. Memory has remained stable.
70.3

## 2024-10-11 NOTE — PRE-ANESTHESIA EVALUATION ADULT - NSANTHADDINFOFT_GEN_ALL_CORE
Patient and operating physician decided to proceed with out anesthesia service.  Advised them we are available PRN

## 2024-10-11 NOTE — ASU PATIENT PROFILE, ADULT - FALL HARM RISK - HARM RISK INTERVENTIONS

## 2024-10-11 NOTE — PRE PROCEDURE NOTE - PRE PROCEDURE EVALUATION
73 year old man with PMHx of Metastatic Prostate Cancer (Stage IV), HTN, HLD s/p left PCN placement for ureteral obstruction s/p last exchange on 9/25 presents today for exchange. Patient states yesterday he felt a tug to the catheter, experienced some pain and noted blood within the bag. Patient presented to IR clinic in which it was determined a contrast evaluation / possible exchange was warranted. Patient presents today for L PCN catheter eval / exchange.    Plan for image guided left PCN exchange with possible conscious sedation / anesthesia today 10/11

## 2024-10-12 PROBLEM — E78.00 PURE HYPERCHOLESTEROLEMIA, UNSPECIFIED: Chronic | Status: ACTIVE | Noted: 2024-10-11

## 2024-10-14 NOTE — ED ADULT NURSE NOTE - NS ED NURSE LEVEL OF CONSCIOUSNESS ORIENTATION
Start topical antifungal cream  Discussed the importance of wearing socks with shoes  Keeping feet warm, dry.  Avoiding walking barefoot in public spaces   Oriented - self; Oriented - place; Oriented - time

## 2024-10-24 ENCOUNTER — OUTPATIENT (OUTPATIENT)
Dept: OUTPATIENT SERVICES | Facility: HOSPITAL | Age: 74
LOS: 1 days | End: 2024-10-24
Payer: MEDICARE

## 2024-10-24 ENCOUNTER — APPOINTMENT (OUTPATIENT)
Age: 74
End: 2024-10-24

## 2024-10-24 DIAGNOSIS — N21.0 CALCULUS IN BLADDER: Chronic | ICD-10-CM

## 2024-10-24 DIAGNOSIS — D64.9 ANEMIA, UNSPECIFIED: ICD-10-CM

## 2024-10-24 DIAGNOSIS — Z90.49 ACQUIRED ABSENCE OF OTHER SPECIFIED PARTS OF DIGESTIVE TRACT: Chronic | ICD-10-CM

## 2024-10-24 LAB
ANION GAP SERPL CALC-SCNC: 13 MMOL/L
BUN SERPL-MCNC: 35 MG/DL
CALCIUM SERPL-MCNC: 8.5 MG/DL
CHLORIDE SERPL-SCNC: 108 MMOL/L
CO2 SERPL-SCNC: 19 MMOL/L
CREAT SERPL-MCNC: 1.4 MG/DL
EGFR: 53 ML/MIN/1.73M2
GLUCOSE SERPL-MCNC: 135 MG/DL
POTASSIUM SERPL-SCNC: 5 MMOL/L
SODIUM SERPL-SCNC: 140 MMOL/L

## 2024-10-24 PROCEDURE — 80048 BASIC METABOLIC PNL TOTAL CA: CPT

## 2024-10-24 PROCEDURE — 36415 COLL VENOUS BLD VENIPUNCTURE: CPT

## 2024-10-31 ENCOUNTER — APPOINTMENT (OUTPATIENT)
Dept: CARDIOLOGY | Facility: CLINIC | Age: 74
End: 2024-10-31
Payer: MEDICARE

## 2024-10-31 ENCOUNTER — APPOINTMENT (OUTPATIENT)
Age: 74
End: 2024-10-31

## 2024-10-31 ENCOUNTER — OUTPATIENT (OUTPATIENT)
Dept: OUTPATIENT SERVICES | Facility: HOSPITAL | Age: 74
LOS: 1 days | End: 2024-10-31
Payer: MEDICARE

## 2024-10-31 ENCOUNTER — RESULT CHARGE (OUTPATIENT)
Age: 74
End: 2024-10-31

## 2024-10-31 VITALS
HEART RATE: 89 BPM | BODY MASS INDEX: 24.27 KG/M2 | SYSTOLIC BLOOD PRESSURE: 104 MMHG | DIASTOLIC BLOOD PRESSURE: 71 MMHG | WEIGHT: 151 LBS | HEIGHT: 66 IN

## 2024-10-31 VITALS — TEMPERATURE: 98 F | DIASTOLIC BLOOD PRESSURE: 74 MMHG | HEART RATE: 86 BPM | SYSTOLIC BLOOD PRESSURE: 111 MMHG

## 2024-10-31 DIAGNOSIS — I25.10 ATHEROSCLEROTIC HEART DISEASE OF NATIVE CORONARY ARTERY W/OUT ANGINA PECTORIS: ICD-10-CM

## 2024-10-31 DIAGNOSIS — E78.5 HYPERLIPIDEMIA, UNSPECIFIED: ICD-10-CM

## 2024-10-31 DIAGNOSIS — D64.9 ANEMIA, UNSPECIFIED: ICD-10-CM

## 2024-10-31 DIAGNOSIS — Z90.49 ACQUIRED ABSENCE OF OTHER SPECIFIED PARTS OF DIGESTIVE TRACT: Chronic | ICD-10-CM

## 2024-10-31 DIAGNOSIS — N21.0 CALCULUS IN BLADDER: Chronic | ICD-10-CM

## 2024-10-31 DIAGNOSIS — I10 ESSENTIAL (PRIMARY) HYPERTENSION: ICD-10-CM

## 2024-10-31 DIAGNOSIS — R06.02 SHORTNESS OF BREATH: ICD-10-CM

## 2024-10-31 DIAGNOSIS — I45.10 UNSPECIFIED RIGHT BUNDLE-BRANCH BLOCK: ICD-10-CM

## 2024-10-31 PROCEDURE — 93000 ELECTROCARDIOGRAM COMPLETE: CPT

## 2024-10-31 PROCEDURE — 96365 THER/PROPH/DIAG IV INF INIT: CPT

## 2024-10-31 PROCEDURE — 99214 OFFICE O/P EST MOD 30 MIN: CPT

## 2024-10-31 PROCEDURE — G2211 COMPLEX E/M VISIT ADD ON: CPT

## 2024-10-31 RX ORDER — ZOLEDRONIC ACID 5 MG/100ML
3.3 INJECTION, SOLUTION INTRAVENOUS ONCE
Refills: 0 | Status: COMPLETED | OUTPATIENT
Start: 2024-10-31 | End: 2024-10-31

## 2024-10-31 RX ADMIN — ZOLEDRONIC ACID 3.3 MILLIGRAM(S): 5 INJECTION, SOLUTION INTRAVENOUS at 15:00

## 2024-10-31 RX ADMIN — ZOLEDRONIC ACID 3.3 MILLIGRAM(S): 5 INJECTION, SOLUTION INTRAVENOUS at 15:30

## 2024-11-12 ENCOUNTER — APPOINTMENT (OUTPATIENT)
Dept: UROLOGY | Facility: CLINIC | Age: 74
End: 2024-11-12
Payer: MEDICARE

## 2024-11-12 ENCOUNTER — LABORATORY RESULT (OUTPATIENT)
Age: 74
End: 2024-11-12

## 2024-11-12 DIAGNOSIS — N13.8 BENIGN PROSTATIC HYPERPLASIA WITH LOWER URINARY TRACT SYMPMS: ICD-10-CM

## 2024-11-12 DIAGNOSIS — R30.0 DYSURIA: ICD-10-CM

## 2024-11-12 DIAGNOSIS — C61 MALIGNANT NEOPLASM OF PROSTATE: ICD-10-CM

## 2024-11-12 DIAGNOSIS — N13.30 UNSPECIFIED HYDRONEPHROSIS: ICD-10-CM

## 2024-11-12 DIAGNOSIS — N40.1 BENIGN PROSTATIC HYPERPLASIA WITH LOWER URINARY TRACT SYMPMS: ICD-10-CM

## 2024-11-12 DIAGNOSIS — C79.51 SECONDARY MALIGNANT NEOPLASM OF BONE: ICD-10-CM

## 2024-11-12 DIAGNOSIS — C80.1 SECONDARY MALIGNANT NEOPLASM OF BONE: ICD-10-CM

## 2024-11-12 DIAGNOSIS — C79.51 MALIGNANT NEOPLASM OF PROSTATE: ICD-10-CM

## 2024-11-12 PROCEDURE — G2211 COMPLEX E/M VISIT ADD ON: CPT

## 2024-11-12 PROCEDURE — 99214 OFFICE O/P EST MOD 30 MIN: CPT

## 2024-11-21 ENCOUNTER — OUTPATIENT (OUTPATIENT)
Dept: OUTPATIENT SERVICES | Facility: HOSPITAL | Age: 74
LOS: 1 days | End: 2024-11-21
Payer: MEDICARE

## 2024-11-21 ENCOUNTER — APPOINTMENT (OUTPATIENT)
Age: 74
End: 2024-11-21

## 2024-11-21 DIAGNOSIS — Z90.49 ACQUIRED ABSENCE OF OTHER SPECIFIED PARTS OF DIGESTIVE TRACT: Chronic | ICD-10-CM

## 2024-11-21 DIAGNOSIS — D64.9 ANEMIA, UNSPECIFIED: ICD-10-CM

## 2024-11-21 DIAGNOSIS — N21.0 CALCULUS IN BLADDER: Chronic | ICD-10-CM

## 2024-11-21 LAB
ANION GAP SERPL CALC-SCNC: 11 MMOL/L
BUN SERPL-MCNC: 28 MG/DL
CALCIUM SERPL-MCNC: 8.6 MG/DL
CHLORIDE SERPL-SCNC: 108 MMOL/L
CO2 SERPL-SCNC: 24 MMOL/L
CREAT SERPL-MCNC: 1.3 MG/DL
EGFR: 58 ML/MIN/1.73M2
GLUCOSE SERPL-MCNC: 124 MG/DL
POTASSIUM SERPL-SCNC: 4.7 MMOL/L
SODIUM SERPL-SCNC: 143 MMOL/L

## 2024-11-21 PROCEDURE — 36415 COLL VENOUS BLD VENIPUNCTURE: CPT

## 2024-11-21 PROCEDURE — 80048 BASIC METABOLIC PNL TOTAL CA: CPT

## 2024-11-22 DIAGNOSIS — D64.9 ANEMIA, UNSPECIFIED: ICD-10-CM

## 2024-11-29 ENCOUNTER — APPOINTMENT (OUTPATIENT)
Age: 74
End: 2024-11-29

## 2024-11-29 ENCOUNTER — OUTPATIENT (OUTPATIENT)
Dept: OUTPATIENT SERVICES | Facility: HOSPITAL | Age: 74
LOS: 1 days | End: 2024-11-29
Payer: MEDICARE

## 2024-11-29 DIAGNOSIS — D64.9 ANEMIA, UNSPECIFIED: ICD-10-CM

## 2024-11-29 DIAGNOSIS — N21.0 CALCULUS IN BLADDER: Chronic | ICD-10-CM

## 2024-11-29 DIAGNOSIS — Z90.49 ACQUIRED ABSENCE OF OTHER SPECIFIED PARTS OF DIGESTIVE TRACT: Chronic | ICD-10-CM

## 2024-11-29 PROCEDURE — 96365 THER/PROPH/DIAG IV INF INIT: CPT

## 2024-11-29 RX ORDER — ZOLEDRONIC ACID 4 MG/5ML
3.3 INJECTION INTRAVENOUS ONCE
Refills: 0 | Status: COMPLETED | OUTPATIENT
Start: 2024-11-29 | End: 2024-11-29

## 2024-11-29 RX ADMIN — ZOLEDRONIC ACID 3.3 MILLIGRAM(S): 4 INJECTION INTRAVENOUS at 11:39

## 2024-11-30 DIAGNOSIS — D64.9 ANEMIA, UNSPECIFIED: ICD-10-CM

## 2024-12-04 ENCOUNTER — RESULT REVIEW (OUTPATIENT)
Age: 74
End: 2024-12-04

## 2024-12-04 ENCOUNTER — OUTPATIENT (OUTPATIENT)
Dept: OUTPATIENT SERVICES | Facility: HOSPITAL | Age: 74
LOS: 1 days | End: 2024-12-04
Payer: MEDICARE

## 2024-12-04 DIAGNOSIS — N21.0 CALCULUS IN BLADDER: Chronic | ICD-10-CM

## 2024-12-04 DIAGNOSIS — Z90.49 ACQUIRED ABSENCE OF OTHER SPECIFIED PARTS OF DIGESTIVE TRACT: Chronic | ICD-10-CM

## 2024-12-04 DIAGNOSIS — C61 MALIGNANT NEOPLASM OF PROSTATE: ICD-10-CM

## 2024-12-04 DIAGNOSIS — Z00.8 ENCOUNTER FOR OTHER GENERAL EXAMINATION: ICD-10-CM

## 2024-12-04 LAB — GLUCOSE BLDC GLUCOMTR-MCNC: 97 MG/DL — SIGNIFICANT CHANGE UP (ref 70–99)

## 2024-12-04 PROCEDURE — 82962 GLUCOSE BLOOD TEST: CPT

## 2024-12-04 PROCEDURE — A9595: CPT

## 2024-12-04 PROCEDURE — 78816 PET IMAGE W/CT FULL BODY: CPT | Mod: PS

## 2024-12-04 PROCEDURE — 78816 PET IMAGE W/CT FULL BODY: CPT | Mod: 26

## 2024-12-05 DIAGNOSIS — C61 MALIGNANT NEOPLASM OF PROSTATE: ICD-10-CM

## 2024-12-09 ENCOUNTER — NON-APPOINTMENT (OUTPATIENT)
Age: 74
End: 2024-12-09

## 2024-12-18 ENCOUNTER — APPOINTMENT (OUTPATIENT)
Age: 74
End: 2024-12-18

## 2024-12-18 ENCOUNTER — OUTPATIENT (OUTPATIENT)
Dept: OUTPATIENT SERVICES | Facility: HOSPITAL | Age: 74
LOS: 1 days | End: 2024-12-18
Payer: MEDICARE

## 2024-12-18 ENCOUNTER — LABORATORY RESULT (OUTPATIENT)
Age: 74
End: 2024-12-18

## 2024-12-18 DIAGNOSIS — N21.0 CALCULUS IN BLADDER: Chronic | ICD-10-CM

## 2024-12-18 DIAGNOSIS — D64.9 ANEMIA, UNSPECIFIED: ICD-10-CM

## 2024-12-18 DIAGNOSIS — Z90.49 ACQUIRED ABSENCE OF OTHER SPECIFIED PARTS OF DIGESTIVE TRACT: Chronic | ICD-10-CM

## 2024-12-18 LAB
ALBUMIN SERPL ELPH-MCNC: 4.2 G/DL
ALP BLD-CCNC: 148 U/L
ALT SERPL-CCNC: 46 U/L
ANION GAP SERPL CALC-SCNC: 15 MMOL/L
AST SERPL-CCNC: 37 U/L
BILIRUB DIRECT SERPL-MCNC: 0.2 MG/DL
BILIRUB INDIRECT SERPL-MCNC: 0.3 MG/DL
BILIRUB SERPL-MCNC: 0.5 MG/DL
BUN SERPL-MCNC: 36 MG/DL
CALCIUM SERPL-MCNC: 8.8 MG/DL
CHLORIDE SERPL-SCNC: 108 MMOL/L
CO2 SERPL-SCNC: 18 MMOL/L
CREAT SERPL-MCNC: 1.4 MG/DL
EGFR: 53 ML/MIN/1.73M2
GLUCOSE SERPL-MCNC: 98 MG/DL
HCT VFR BLD CALC: 33.5 %
HGB BLD-MCNC: 11.2 G/DL
IRON SATN MFR SERPL: 33 %
IRON SERPL-MCNC: 98 UG/DL
MCHC RBC-ENTMCNC: 30.9 PG
MCHC RBC-ENTMCNC: 33.4 G/DL
MCV RBC AUTO: 92.3 FL
PLATELET # BLD AUTO: 199 K/UL
PMV BLD: 9.3 FL
POTASSIUM SERPL-SCNC: 5 MMOL/L
PROT SERPL-MCNC: 6.5 G/DL
RBC # BLD: 3.63 M/UL
RBC # FLD: 13.7 %
SODIUM SERPL-SCNC: 141 MMOL/L
TIBC SERPL-MCNC: 300 UG/DL
UIBC SERPL-MCNC: 202 UG/DL
WBC # FLD AUTO: 6.94 K/UL

## 2024-12-18 PROCEDURE — 85027 COMPLETE CBC AUTOMATED: CPT

## 2024-12-18 PROCEDURE — 80076 HEPATIC FUNCTION PANEL: CPT

## 2024-12-18 PROCEDURE — 82728 ASSAY OF FERRITIN: CPT

## 2024-12-18 PROCEDURE — 80048 BASIC METABOLIC PNL TOTAL CA: CPT

## 2024-12-18 PROCEDURE — 36415 COLL VENOUS BLD VENIPUNCTURE: CPT

## 2024-12-18 PROCEDURE — 83550 IRON BINDING TEST: CPT

## 2024-12-18 PROCEDURE — 84153 ASSAY OF PSA TOTAL: CPT

## 2024-12-18 PROCEDURE — 83540 ASSAY OF IRON: CPT

## 2024-12-19 DIAGNOSIS — D64.9 ANEMIA, UNSPECIFIED: ICD-10-CM

## 2024-12-19 LAB
FERRITIN SERPL-MCNC: 408 NG/ML
PSA SERPL-MCNC: 0.55 NG/ML

## 2024-12-23 ENCOUNTER — OUTPATIENT (OUTPATIENT)
Dept: OUTPATIENT SERVICES | Facility: HOSPITAL | Age: 74
LOS: 1 days | End: 2024-12-23
Payer: MEDICARE

## 2024-12-23 ENCOUNTER — APPOINTMENT (OUTPATIENT)
Age: 74
End: 2024-12-23
Payer: MEDICARE

## 2024-12-23 VITALS
SYSTOLIC BLOOD PRESSURE: 145 MMHG | RESPIRATION RATE: 16 BRPM | DIASTOLIC BLOOD PRESSURE: 75 MMHG | TEMPERATURE: 98.2 F | HEART RATE: 65 BPM

## 2024-12-23 VITALS
TEMPERATURE: 97.7 F | SYSTOLIC BLOOD PRESSURE: 154 MMHG | OXYGEN SATURATION: 100 % | HEIGHT: 66 IN | DIASTOLIC BLOOD PRESSURE: 80 MMHG | RESPIRATION RATE: 16 BRPM | WEIGHT: 158 LBS | BODY MASS INDEX: 25.39 KG/M2 | HEART RATE: 84 BPM

## 2024-12-23 DIAGNOSIS — L27.0 GENERALIZED SKIN ERUPTION DUE TO DRUGS AND MEDICAMENTS TAKEN INTERNALLY: ICD-10-CM

## 2024-12-23 DIAGNOSIS — C79.51 MALIGNANT NEOPLASM OF PROSTATE: ICD-10-CM

## 2024-12-23 DIAGNOSIS — Z51.81 ENCOUNTER FOR THERAPEUTIC DRUG LVL MONITORING: ICD-10-CM

## 2024-12-23 DIAGNOSIS — N21.0 CALCULUS IN BLADDER: Chronic | ICD-10-CM

## 2024-12-23 DIAGNOSIS — Z90.49 ACQUIRED ABSENCE OF OTHER SPECIFIED PARTS OF DIGESTIVE TRACT: Chronic | ICD-10-CM

## 2024-12-23 DIAGNOSIS — C61 MALIGNANT NEOPLASM OF PROSTATE: ICD-10-CM

## 2024-12-23 DIAGNOSIS — D64.9 ANEMIA, UNSPECIFIED: ICD-10-CM

## 2024-12-23 DIAGNOSIS — Z79.818 ENCOUNTER FOR THERAPEUTIC DRUG LVL MONITORING: ICD-10-CM

## 2024-12-23 PROCEDURE — 99215 OFFICE O/P EST HI 40 MIN: CPT

## 2024-12-23 PROCEDURE — G2211 COMPLEX E/M VISIT ADD ON: CPT

## 2024-12-23 PROCEDURE — 96402 CHEMO HORMON ANTINEOPL SQ/IM: CPT

## 2024-12-23 PROCEDURE — 96365 THER/PROPH/DIAG IV INF INIT: CPT

## 2024-12-23 RX ORDER — LEUPROLIDE 42 MG/.37G
22.5 INJECTION, EMULSION SUBCUTANEOUS ONCE
Refills: 0 | Status: COMPLETED | OUTPATIENT
Start: 2024-12-23 | End: 2024-12-23

## 2024-12-23 RX ORDER — ZOLEDRONIC ACID 0.05 MG/ML
3.3 INJECTION, SOLUTION INTRAVENOUS ONCE
Refills: 0 | Status: COMPLETED | OUTPATIENT
Start: 2024-12-23 | End: 2024-12-23

## 2024-12-23 RX ADMIN — ZOLEDRONIC ACID 3.3 MILLIGRAM(S): 0.05 INJECTION, SOLUTION INTRAVENOUS at 14:30

## 2024-12-23 RX ADMIN — LEUPROLIDE 22.5 MILLIGRAM(S): 42 INJECTION, EMULSION SUBCUTANEOUS at 14:16

## 2024-12-23 RX ADMIN — ZOLEDRONIC ACID 3.3 MILLIGRAM(S): 0.05 INJECTION, SOLUTION INTRAVENOUS at 14:50

## 2024-12-30 ENCOUNTER — OUTPATIENT (OUTPATIENT)
Dept: OUTPATIENT SERVICES | Facility: HOSPITAL | Age: 74
LOS: 1 days | End: 2024-12-30
Payer: MEDICARE

## 2024-12-30 VITALS
RESPIRATION RATE: 16 BRPM | SYSTOLIC BLOOD PRESSURE: 141 MMHG | DIASTOLIC BLOOD PRESSURE: 81 MMHG | WEIGHT: 155.21 LBS | HEIGHT: 67 IN | TEMPERATURE: 98 F | HEART RATE: 89 BPM | OXYGEN SATURATION: 98 %

## 2024-12-30 DIAGNOSIS — Z01.818 ENCOUNTER FOR OTHER PREPROCEDURAL EXAMINATION: ICD-10-CM

## 2024-12-30 DIAGNOSIS — Z90.49 ACQUIRED ABSENCE OF OTHER SPECIFIED PARTS OF DIGESTIVE TRACT: Chronic | ICD-10-CM

## 2024-12-30 DIAGNOSIS — N40.1 BENIGN PROSTATIC HYPERPLASIA WITH LOWER URINARY TRACT SYMPTOMS: ICD-10-CM

## 2024-12-30 LAB
APTT BLD: 34.2 SEC — SIGNIFICANT CHANGE UP (ref 27–39.2)
INR BLD: 0.93 RATIO — SIGNIFICANT CHANGE UP (ref 0.65–1.3)
PROTHROM AB SERPL-ACNC: 11 SEC — SIGNIFICANT CHANGE UP (ref 9.95–12.87)

## 2024-12-30 PROCEDURE — 93005 ELECTROCARDIOGRAM TRACING: CPT

## 2024-12-30 PROCEDURE — 85610 PROTHROMBIN TIME: CPT

## 2024-12-30 PROCEDURE — 99214 OFFICE O/P EST MOD 30 MIN: CPT | Mod: 25

## 2024-12-30 PROCEDURE — 36415 COLL VENOUS BLD VENIPUNCTURE: CPT

## 2024-12-30 PROCEDURE — 93010 ELECTROCARDIOGRAM REPORT: CPT

## 2024-12-30 PROCEDURE — 85730 THROMBOPLASTIN TIME PARTIAL: CPT

## 2024-12-30 NOTE — H&P PST ADULT - HISTORY OF PRESENT ILLNESS
74 year old male for PCN exchange for" urine problem and bladder infection from urine" Has prostate cancer stage 4 since dx in March. Had chemo for 6 months and last dose since august 1st. Patient presently has rash since nov and is being seen by dermatology today. Patient also had chest pain with exercise that went away but was seen by Juan Dorantes.  presently feeling better today  fos=2  denies chest pain sob palp  recent   denies recent uri or uti but does have diffuse rash on body presently  Anesthesia Alert  NO--Difficult Airway  NO--History of neck surgery or radiation  NO--Limited ROM of neck  NO--History of Malignant hyperthermia  NO--Personal or family history of Pseudocholinesterase deficiency  NO--Prior Anesthesia Complication  NO--Latex Allergy  NO--Loose teeth  NO--History of Rheumatoid Arthritis  NO--TAYO  NO BLEEDING RISK  NO--Other_____    As per patient, this is their complete medical and surgical history, including medications both prescribed or over the counter meds  Patient verbalized understanding of instructions and was given the opportunity to ask questions and have them answered.  45.45 points  The higher the score (maximum 58.2), the higher the functional status.  8.33 METs          0 points  RCRI Score  3.9 %  Risk of major cardiac eventalized understanding of instructions and was given the opportunity to ask questions and have them answ

## 2024-12-30 NOTE — H&P PST ADULT - NSANTHOSAYNRD_GEN_A_CORE
No. TAYO screening performed.  STOP BANG Legend: 0-2 = LOW Risk; 3-4 = INTERMEDIATE Risk; 5-8 = HIGH Risk

## 2024-12-31 DIAGNOSIS — Z01.818 ENCOUNTER FOR OTHER PREPROCEDURAL EXAMINATION: ICD-10-CM

## 2024-12-31 DIAGNOSIS — N40.1 BENIGN PROSTATIC HYPERPLASIA WITH LOWER URINARY TRACT SYMPTOMS: ICD-10-CM

## 2025-01-06 ENCOUNTER — TRANSCRIPTION ENCOUNTER (OUTPATIENT)
Age: 75
End: 2025-01-06

## 2025-01-06 ENCOUNTER — OUTPATIENT (OUTPATIENT)
Dept: OUTPATIENT SERVICES | Facility: HOSPITAL | Age: 75
LOS: 1 days | Discharge: HOME | End: 2025-01-06
Payer: MEDICARE

## 2025-01-06 VITALS
DIASTOLIC BLOOD PRESSURE: 91 MMHG | HEART RATE: 85 BPM | TEMPERATURE: 97 F | SYSTOLIC BLOOD PRESSURE: 137 MMHG | OXYGEN SATURATION: 100 % | RESPIRATION RATE: 17 BRPM

## 2025-01-06 VITALS
RESPIRATION RATE: 18 BRPM | SYSTOLIC BLOOD PRESSURE: 149 MMHG | TEMPERATURE: 97 F | WEIGHT: 154.98 LBS | DIASTOLIC BLOOD PRESSURE: 79 MMHG | HEART RATE: 92 BPM

## 2025-01-06 DIAGNOSIS — N40.1 BENIGN PROSTATIC HYPERPLASIA WITH LOWER URINARY TRACT SYMPTOMS: ICD-10-CM

## 2025-01-06 DIAGNOSIS — N21.0 CALCULUS IN BLADDER: Chronic | ICD-10-CM

## 2025-01-06 PROCEDURE — C1887: CPT

## 2025-01-06 PROCEDURE — 50434 CONVERT NEPHROSTOMY CATHETER: CPT | Mod: LT

## 2025-01-06 PROCEDURE — C1769: CPT

## 2025-01-06 PROCEDURE — C2617: CPT

## 2025-01-06 RX ORDER — CIPROFLOXACIN HYDROCHLORIDE 500 MG/1
400 TABLET, FILM COATED ORAL ONCE
Refills: 0 | Status: COMPLETED | OUTPATIENT
Start: 2025-01-06 | End: 2025-01-06

## 2025-01-06 RX ADMIN — CIPROFLOXACIN HYDROCHLORIDE 200 MILLIGRAM(S): 500 TABLET, FILM COATED ORAL at 09:38

## 2025-01-06 NOTE — ASU PATIENT PROFILE, ADULT - FALL HARM RISK - UNIVERSAL INTERVENTIONS
Bed in lowest position, wheels locked, appropriate side rails in place/Call bell, personal items and telephone in reach/Instruct patient to call for assistance before getting out of bed or chair/Non-slip footwear when patient is out of bed/[place] to call system/Physically safe environment - no spills, clutter or unnecessary equipment/Room/bathroom lighting operational, light cord in reach

## 2025-01-06 NOTE — PRE PROCEDURE NOTE - PRE PROCEDURE EVALUATION
73yo M PMH Metastatic Prostate Cancer (Stage IV), HTN, HLD s/p left PCN placement for ureteral obstruction s/p last exchange on 10/11 presents today for routine exchange.     Plan for image guided left PCN exchange with conscious sedation / anesthesia today 1/6

## 2025-01-06 NOTE — ASU PATIENT PROFILE, ADULT - NSICDXPASTMEDICALHX_GEN_ALL_CORE_FT
Health Maintenance Summary     Topic Due On Due Status Completed On Postpone Until Reason    Pap Smear - Cervical Cancer Screening  Jan 30, 2020 Not Due Jan 30, 2015      Immunization - TDAP Pregnancy  Hidden       IMMUNIZATION - DTaP/Tdap/Td Jun 5, 2023 Not Due Jun 5, 2013      Immunization-Influenza Sep 1, 2017 Postponed Dec 31, 2012 Oct 5, 2018 Patient Refused          Patient is due for topics as listed above, she wishes to decline at this time .       PAST MEDICAL HISTORY:  Bladder stones     BPH (benign prostatic hyperplasia)     High cholesterol     Hypertension     Prostate cancer

## 2025-01-06 NOTE — ASU DISCHARGE PLAN (ADULT/PEDIATRIC) - FINANCIAL ASSISTANCE
[facility] provides services at a reduced cost to those who are determined to be eligible through [facility]’s financial assistance program. Information regarding [facility]’s financial assistance program can be found by going to [email or web address] or by calling [phone].

## 2025-01-06 NOTE — CHART NOTE - NSCHARTNOTEFT_GEN_A_CORE
PACU ANESTHESIA ADMISSION NOTE      Procedure: Nephrostomy tube replacement  Post op diagnosis:  BPH        __x__  Patent Airway    __x__  Full return of protective reflexes    __x__  Full recovery from anesthesia / back to baseline status    Vitals:  T(C): 35.9 (01-06-25 @ 10:53), Max: 36.2 (01-06-25 @ 08:37)  HR: 91 (01-06-25 @ 10:53) (91 - 92)  BP: 145/78 (01-06-25 @ 10:53) (145/78 - 149/79)  RR: 18 (01-06-25 @ 10:53) (18 - 18)  SpO2: 99% (01-06-25 @ 10:53) (99% - 99%)    Mental Status:  __x__ Awake   ___x__ Alert   _____ Drowsy   _____ Sedated    Nausea/Vomiting:  __x__ NO  ______Yes,   See Post - Op Orders          Pain Scale (0-10):  _0____    Treatment: ____ None    __x__ See Post - Op/PCA Orders    Post - Operative Fluids:   ____ Oral   __x__ See Post - Op Orders    Plan: Discharge:   __x__Home       _____Floor     _____Critical Care    _____  Other:_________________    Comments: Patient had smooth intraoperative event, no anesthesia complication.

## 2025-01-13 ENCOUNTER — OUTPATIENT (OUTPATIENT)
Dept: OUTPATIENT SERVICES | Facility: HOSPITAL | Age: 75
LOS: 1 days | End: 2025-01-13
Payer: MEDICARE

## 2025-01-13 ENCOUNTER — APPOINTMENT (OUTPATIENT)
Age: 75
End: 2025-01-13

## 2025-01-13 ENCOUNTER — LABORATORY RESULT (OUTPATIENT)
Age: 75
End: 2025-01-13

## 2025-01-13 ENCOUNTER — APPOINTMENT (OUTPATIENT)
Dept: INTERVENTIONAL RADIOLOGY/VASCULAR | Facility: CLINIC | Age: 75
End: 2025-01-13

## 2025-01-13 VITALS
TEMPERATURE: 97.9 F | HEART RATE: 101 BPM | OXYGEN SATURATION: 99 % | SYSTOLIC BLOOD PRESSURE: 148 MMHG | DIASTOLIC BLOOD PRESSURE: 92 MMHG

## 2025-01-13 DIAGNOSIS — Z90.49 ACQUIRED ABSENCE OF OTHER SPECIFIED PARTS OF DIGESTIVE TRACT: Chronic | ICD-10-CM

## 2025-01-13 DIAGNOSIS — N21.0 CALCULUS IN BLADDER: Chronic | ICD-10-CM

## 2025-01-13 DIAGNOSIS — D64.9 ANEMIA, UNSPECIFIED: ICD-10-CM

## 2025-01-13 LAB
HCT VFR BLD CALC: 32.6 %
HGB BLD-MCNC: 11.3 G/DL
MCHC RBC-ENTMCNC: 31.3 PG
MCHC RBC-ENTMCNC: 34.7 G/DL
MCV RBC AUTO: 90.3 FL
PLATELET # BLD AUTO: 225 K/UL
PMV BLD: 9.6 FL
RBC # BLD: 3.61 M/UL
RBC # FLD: 13.8 %
WBC # FLD AUTO: 9.31 K/UL

## 2025-01-13 PROCEDURE — 85027 COMPLETE CBC AUTOMATED: CPT

## 2025-01-13 PROCEDURE — 80076 HEPATIC FUNCTION PANEL: CPT

## 2025-01-13 PROCEDURE — 36415 COLL VENOUS BLD VENIPUNCTURE: CPT

## 2025-01-13 PROCEDURE — 84153 ASSAY OF PSA TOTAL: CPT

## 2025-01-13 PROCEDURE — 80048 BASIC METABOLIC PNL TOTAL CA: CPT

## 2025-01-14 DIAGNOSIS — D64.9 ANEMIA, UNSPECIFIED: ICD-10-CM

## 2025-01-15 LAB
ALBUMIN SERPL ELPH-MCNC: 4.6 G/DL
ALP BLD-CCNC: 134 U/L
ALT SERPL-CCNC: 36 U/L
ANION GAP SERPL CALC-SCNC: 14 MMOL/L
AST SERPL-CCNC: 23 U/L
BILIRUB DIRECT SERPL-MCNC: 0.2 MG/DL
BILIRUB INDIRECT SERPL-MCNC: 0.4 MG/DL
BILIRUB SERPL-MCNC: 0.6 MG/DL
BUN SERPL-MCNC: 23 MG/DL
CALCIUM SERPL-MCNC: 9.1 MG/DL
CHLORIDE SERPL-SCNC: 104 MMOL/L
CO2 SERPL-SCNC: 22 MMOL/L
CREAT SERPL-MCNC: 1.3 MG/DL
EGFR: 58 ML/MIN/1.73M2
GLUCOSE SERPL-MCNC: 107 MG/DL
POTASSIUM SERPL-SCNC: 4.8 MMOL/L
PROT SERPL-MCNC: 6.5 G/DL
PSA SERPL-MCNC: 2.41 NG/ML
SODIUM SERPL-SCNC: 140 MMOL/L

## 2025-01-23 ENCOUNTER — APPOINTMENT (OUTPATIENT)
Age: 75
End: 2025-01-23
Payer: MEDICARE

## 2025-01-23 ENCOUNTER — APPOINTMENT (OUTPATIENT)
Dept: CARDIOLOGY | Facility: CLINIC | Age: 75
End: 2025-01-23
Payer: MEDICARE

## 2025-01-23 ENCOUNTER — NON-APPOINTMENT (OUTPATIENT)
Age: 75
End: 2025-01-23

## 2025-01-23 ENCOUNTER — RESULT CHARGE (OUTPATIENT)
Age: 75
End: 2025-01-23

## 2025-01-23 ENCOUNTER — OUTPATIENT (OUTPATIENT)
Dept: OUTPATIENT SERVICES | Facility: HOSPITAL | Age: 75
LOS: 1 days | End: 2025-01-23
Payer: MEDICARE

## 2025-01-23 VITALS
WEIGHT: 153 LBS | SYSTOLIC BLOOD PRESSURE: 124 MMHG | HEIGHT: 66 IN | BODY MASS INDEX: 24.59 KG/M2 | HEART RATE: 85 BPM | DIASTOLIC BLOOD PRESSURE: 81 MMHG

## 2025-01-23 VITALS
HEART RATE: 77 BPM | WEIGHT: 152 LBS | SYSTOLIC BLOOD PRESSURE: 112 MMHG | HEIGHT: 66 IN | OXYGEN SATURATION: 100 % | TEMPERATURE: 98.1 F | DIASTOLIC BLOOD PRESSURE: 72 MMHG | BODY MASS INDEX: 24.43 KG/M2

## 2025-01-23 VITALS — HEART RATE: 77 BPM | SYSTOLIC BLOOD PRESSURE: 112 MMHG | DIASTOLIC BLOOD PRESSURE: 72 MMHG | TEMPERATURE: 98 F

## 2025-01-23 VITALS — BODY MASS INDEX: 24.98 KG/M2 | WEIGHT: 154.76 LBS

## 2025-01-23 DIAGNOSIS — Z79.818 ENCOUNTER FOR THERAPEUTIC DRUG LVL MONITORING: ICD-10-CM

## 2025-01-23 DIAGNOSIS — D64.9 ANEMIA, UNSPECIFIED: ICD-10-CM

## 2025-01-23 DIAGNOSIS — E78.5 HYPERLIPIDEMIA, UNSPECIFIED: ICD-10-CM

## 2025-01-23 DIAGNOSIS — C61 MALIGNANT NEOPLASM OF PROSTATE: ICD-10-CM

## 2025-01-23 DIAGNOSIS — C79.51 MALIGNANT NEOPLASM OF PROSTATE: ICD-10-CM

## 2025-01-23 DIAGNOSIS — I25.10 ATHEROSCLEROTIC HEART DISEASE OF NATIVE CORONARY ARTERY W/OUT ANGINA PECTORIS: ICD-10-CM

## 2025-01-23 DIAGNOSIS — Z51.81 ENCOUNTER FOR THERAPEUTIC DRUG LVL MONITORING: ICD-10-CM

## 2025-01-23 DIAGNOSIS — R06.02 SHORTNESS OF BREATH: ICD-10-CM

## 2025-01-23 DIAGNOSIS — Z90.49 ACQUIRED ABSENCE OF OTHER SPECIFIED PARTS OF DIGESTIVE TRACT: Chronic | ICD-10-CM

## 2025-01-23 DIAGNOSIS — Z80.42 FAMILY HISTORY OF MALIGNANT NEOPLASM OF PROSTATE: ICD-10-CM

## 2025-01-23 DIAGNOSIS — N21.0 CALCULUS IN BLADDER: Chronic | ICD-10-CM

## 2025-01-23 DIAGNOSIS — Z80.0 FAMILY HISTORY OF MALIGNANT NEOPLASM OF DIGESTIVE ORGANS: ICD-10-CM

## 2025-01-23 DIAGNOSIS — L27.0 GENERALIZED SKIN ERUPTION DUE TO DRUGS AND MEDICAMENTS TAKEN INTERNALLY: ICD-10-CM

## 2025-01-23 DIAGNOSIS — I45.10 UNSPECIFIED RIGHT BUNDLE-BRANCH BLOCK: ICD-10-CM

## 2025-01-23 DIAGNOSIS — I10 ESSENTIAL (PRIMARY) HYPERTENSION: ICD-10-CM

## 2025-01-23 PROCEDURE — 99215 OFFICE O/P EST HI 40 MIN: CPT

## 2025-01-23 PROCEDURE — G2211 COMPLEX E/M VISIT ADD ON: CPT

## 2025-01-23 PROCEDURE — 99214 OFFICE O/P EST MOD 30 MIN: CPT

## 2025-01-23 PROCEDURE — 96365 THER/PROPH/DIAG IV INF INIT: CPT

## 2025-01-23 PROCEDURE — 99215 OFFICE O/P EST HI 40 MIN: CPT | Mod: 25

## 2025-01-23 PROCEDURE — 93000 ELECTROCARDIOGRAM COMPLETE: CPT

## 2025-01-23 RX ORDER — ZOLEDRONIC ACID 0.05 MG/ML
3.3 INJECTION, SOLUTION INTRAVENOUS ONCE
Refills: 0 | Status: COMPLETED | OUTPATIENT
Start: 2025-01-23 | End: 2025-01-23

## 2025-01-23 RX ADMIN — ZOLEDRONIC ACID 3.3 MILLIGRAM(S): 0.05 INJECTION, SOLUTION INTRAVENOUS at 11:40

## 2025-01-23 RX ADMIN — ZOLEDRONIC ACID 3.3 MILLIGRAM(S): 0.05 INJECTION, SOLUTION INTRAVENOUS at 11:19

## 2025-01-24 NOTE — ASU PATIENT PROFILE, ADULT - HEALTH/HEALTHCARE ANXIETIES, PROFILE
Chief Complaint   Transfer (Pt transferred from [place] via [facility] for retroperitoneal hematoma. )      History Of Present Illness   Joe Morgan is a 63 y.o. male presenting as a transfer from outside ED due to retroperitoneal hematomas with active contrast extravasation.  Transferred to this facility for further management with Interventional Radiology.  On arrival, patient was alert and oriented.  He complains of back pain that wraps around to his anterior abdomen bilaterally.  He reports no chest pain, or shortness of breath.  States he was previously on Eliquis but has not taken it for the past 2 days.  He reports no vomiting, or diarrhea.  Reports no known bloody bowel movements.     Independent Historian: Yes  Other Historian or Collateral: Chart review  Interpretor: No      Review of patient's allergies indicates:   Allergen Reactions    Nubain [nalbuphine] Anaphylaxis    Ativan [lorazepam] Other (See Comments)     Climbs wall         No current facility-administered medications on file prior to encounter.     Current Outpatient Medications on File Prior to Encounter   Medication Sig Dispense Refill    alprazolam (XANAX) 1 MG tablet Take 1 tablet (1 mg total) by mouth daily as needed. (Patient not taking: Reported on 1/23/2025) 30 tablet 2    aspirin 81 MG Chew Take 81 mg by mouth once daily.      blood sugar diagnostic Strp 1 each by Misc.(Non-Drug; Combo Route) route 4 (four) times daily. 120 each 11    buprenorphine-naloxone 8-2 mg (SUBOXONE) 8-2 mg Place 3 Film under the tongue once daily.      ELIQUIS 5 mg Tab Take 5 mg by mouth 2 (two) times daily.      flecainide (TAMBOCOR) 50 MG Tab Take 50 mg by mouth 2 (two) times daily.      insulin aspart (NOVOLOG) 100 unit/mL injection INJECT 20 UNITS UNDER SKIN 3 TIMES A DAY BEFORE MEALS 40 mL 0    insulin detemir (LEVEMIR FLEXTOUCH) 100 unit/mL (3 mL) SubQ InPn pen Inject 40 Units into the skin every evening. 3 Box 5    insulin syringe-needle U-100  "0.3 mL 29 gauge x 1/2" Syrg 1 each by Misc.(Non-Drug; Combo Route) route 3 (three) times daily. 100 each 11    lisinopril (PRINIVIL,ZESTRIL) 40 MG tablet Take 1 tablet (40 mg total) by mouth once daily. 30 tablet 0    metoprolol succinate (TOPROL-XL) 25 MG 24 hr tablet Take 25 mg by mouth once daily.      ondansetron (ZOFRAN-ODT) 4 MG TbDL Take 4 mg by mouth every 6 (six) hours as needed (Nausea).      pen needle, diabetic (PEN NEEDLE) 31 gauge x 1/4" Ndle 1 each by Misc.(Non-Drug; Combo Route) route 3 (three) times daily. 100 each 5       Past History   As per HPI and below:  Past Medical History:   Diagnosis Date    Diabetes mellitus     GERD (gastroesophageal reflux disease)     Hip discomfort     Hip fracture     Hyperlipidemia     Hypertension     Pancreas disorder     Spine pain      Past Surgical History:   Procedure Laterality Date    ANGIOGRAM, RENAL, BILATERAL, SELECTIVE, WITH S&I Bilateral 1/23/2025    Procedure: ANGIOGRAM, RENAL, BILATERAL, SELECTIVE, WITH S&I;  Surgeon: Celeste Win;  Location: [facility];  Service: Anesthesiology;  Laterality: Bilateral;    FRACTURE SURGERY         Social History     Socioeconomic History    Marital status:    Tobacco Use    Smoking status: Never    Smokeless tobacco: Current     Types: Chew   Substance and Sexual Activity    Alcohol use: No    Drug use: No    Sexual activity: Yes     Partners: Male     Social Drivers of Health     Financial Resource Strain: Low Risk  (1/24/2025)    Overall Financial Resource Strain (CARDIA)     Difficulty of Paying Living Expenses: Not very hard   Food Insecurity: No Food Insecurity (1/24/2025)    Hunger Vital Sign     Worried About Running Out of Food in the Last Year: Never true     Ran Out of Food in the Last Year: Never true   Transportation Needs: No Transportation Needs (1/24/2025)    TRANSPORTATION NEEDS     Transportation : No   Physical Activity: Patient Declined (1/24/2025)    Exercise Vital Sign     Days of Exercise " per Week: Patient declined     Minutes of Exercise per Session: Patient declined   Stress: No Stress Concern Present (1/24/2025)    [place] [employer] - Occupational Stress Questionnaire     Feeling of Stress : Only a little   Housing Stability: Low Risk  (1/24/2025)    Housing Stability Vital Sign     Unable to Pay for Housing in the Last Year: No     Homeless in the Last Year: No       No family history on file.    Physical Exam     Vitals:    01/28/25 0500 01/28/25 0548 01/28/25 0625 01/28/25 0700   BP: (!) 175/81  (!) 159/80 (!) 177/82   BP Location: Left arm  Left arm Left arm   Patient Position: Lying  Lying Lying   Pulse: 67  67 70   Resp: (!) 23 16 14 15   Temp:    98.8 °F (37.1 °C)   TempSrc:    Oral   SpO2: (!) 90%  97% 96%   Weight:           Physical Exam  Constitutional:       General: He is not in acute distress.  HENT:      Head: Normocephalic and atraumatic.      Nose: Nose normal.      Mouth/Throat:      Mouth: Mucous membranes are moist.   Eyes:      Extraocular Movements: Extraocular movements intact.   Cardiovascular:      Rate and Rhythm: Normal rate.   Pulmonary:      Effort: No respiratory distress.   Abdominal:      General: There is no distension.   Musculoskeletal:         General: No swelling.      Cervical back: No rigidity.   Skin:     Capillary Refill: Capillary refill takes less than 2 seconds.   Neurological:      General: No focal deficit present.      Mental Status: He is alert.             Results     Labs Reviewed   TYPE & SCREEN       Result Value    Group & Rh A POS      Indirect Aliya NEG      Specimen Outdate 01/26/2025 23:59         Imaging Results              IR Angiogram Visceral (Final result)  Result time 01/24/25 16:13:53      Final result by Evangelista Stevens MD (01/24/25 16:13:53)                   Impression:      Angiography of the right testicular artery, accessory right renal artery, right renal artery and branches demonstrates active  extravasation from small right testicular artery branch.  Embolization of right testicular artery as described above.    Plan:    Admit to ICU for ongoing care.  Continue serial hemoglobin/hematocrit.  Consider repeat CT if the patient shows signs of active bleeding or acute decompensation.  Recommend urology consultation to evaluate for possible testicular ischemia.  Findings and recommendations were discussed with primary team at the time of the procedure.    _______________________________________________________________      Electronically signed by: Evangelista Stevens MD  Date:    01/24/2025  Time:    16:13               Narrative:    EXAMINATION:  Mesenteric angiography and embolization    Procedural Personnel    Attending physician(s): Evangelista Stevens MD    Fellow physician(s): None    Resident physician(s): None    Advanced practice provider(s): None    Pre-procedure diagnosis: Renal and subcapsular bleeding    Post-procedure diagnosis: Same    Indication: Renal and subcapsular bleeding    Additional history:    Complications: No immediate complications.    TECHNIQUE:  - Arterial puncture with ultrasound guidance    - Selective angiography: Right renal artery, accessory right renal artery, right testicular artery    - Superselective angiography: Performed as described below    - Embolization and post-embolization angiography as described below    - Additional procedure(s): Cone beam CT    FINDINGS:  Pre-procedure    Consent: Informed consent for the procedure was obtained and time-out was performed prior to the procedure.    Preparation: The site was prepared and draped using maximal sterile barrier technique including cutaneous antisepsis.    Anesthesia/sedation    The IR procedural team has confirmed the patient ID and re-evaluated the patient and sedation plan confirming it is suitable for the patient's condition and procedure.    Level of anesthesia/sedation: General anesthesia    Anesthesia/sedation  administered by: Anesthesiology    Total intra-service sedation time (minutes): See anesthesia record    Access    Local anesthesia was administered. The vessel was sonographically evaluated and judged to be patent. Real time ultrasound was used to visualize needle entry into the vessel and a permanent image was stored. A 5 Singaporean sheath was placed.    Vessel accessed: Right common femoral artery    Access technique: Micropuncture set with 21 gauge needle    Mesenteric angiography    The mesenteric arterial system was catheterized using reverse curved catheter, 2.0 Singaporean microcatheter and 2.8 Singaporean microcatheter.    Variant anatomy: None    Vessel catheterized: Right testicular artery    DSA images were obtained which demonstrate active extravasation from small retroperitoneal branch.  Despite several attempts, the bleeding vessel could not be catheterized., therefore decision was made to embolize the testicular artery.  Discussion was had with the patient's wife prior to embolization to relay that this could result in loss of the right testicle due to ischemia.  Wife agreed to proceed with embolization to stop the bleeding.    Vessel catheterized: Accessory right renal artery of the aorta    DSA images were acquired which demonstrate Errol mint of the adrenal gland and upper pole of the kidney but no evidence for active extravasation.    Advanced imaging    Localized intra-operative cone-beam CT was performed with contrast prior to embolization. Images were transmitted to an independent workstation for 3D rendering and image review under concurrent physician supervision.    Indication for advanced imaging: Determine vascular anatomy and/or tumor supply    Cone beam CT images were acquired which do not demonstrate evidence for active extravasation.  Embolization of performed.    Vessel catheterized: Right renal artery    DSA images were acquired which do not demonstrate evidence for active  extravasation.    Advanced imaging    Localized intra-operative cone-beam CT was performed with contrast prior to embolization. Images were transmitted to an independent workstation for 3D rendering and image review under concurrent physician supervision.    Indication for advanced imaging: Determine vascular anatomy and/or tumor supply    Cone beam CT images of the right renal artery do not demonstrate evidence for active extravasation.  There is capsular enhancement.  No embolization performed.    Vessel catheterized: Posterior division of the right renal artery    DSA images were acquired which do not demonstrate evidence for active extravasation.    Advanced imaging    Localized intra-operative cone-beam CT was performed with contrast prior to embolization. Images were transmitted to an independent workstation for 3D rendering and image review under concurrent physician supervision.    Indication for advanced imaging: Determine vascular anatomy and/or tumor supply    Cone beam CT images of posterior division right renal artery do not demonstrate active extravasation.  No embolization performed.    Vessel catheterized: Upper pole branch of the anterior division of the right renal artery    DSA images were acquired which do not demonstrate evidence for active extravasation.    Advanced imaging    Localized intra-operative cone-beam CT was performed with contrast prior to embolization. Images were transmitted to an independent workstation for 3D rendering and image review under concurrent physician supervision.    Indication for advanced imaging: Determine vascular anatomy and/or tumor supply    Cone beam CT images of the upper pole branch of the anterior division of the right renal artery do not demonstrate active extravasation.  No embolization performed.    Vessel catheterized: Lower pole branch of the anterior division of the right renal artery    DSA images were acquired which do not demonstrate evidence for  active extravasation.    Advanced imaging    Localized intra-operative cone-beam CT was performed with contrast prior to embolization. Images were transmitted to an independent workstation for 3D rendering and image review under concurrent physician supervision.    Indication for advanced imaging: Determine vascular anatomy and/or tumor supply    Cone beam CT images of the lower pole branch of the anterior division of right renal artery do not demonstrate active extravasation.  No embolization performed.    Embolization    Catheter position for embolization #1: Right testicular artery    - Embolic(s): Obsidio    - Angiographic endpoint: Complete stasis (static contrast column for at least 5 heartbeats)    Post-embolization angiography    Vessel catheterized: Right testicular artery    DSA images demonstrate static flow within the embolized segment and no evidence for persistent active extravasation.    Closure    Access site angiography performed: Yes    Patent vessel with appropriate access level    Arterial closure technique: Vascade    Hemostasis achieved from closure technique: Yes    Duration of manual compression (minutes): 5    Contrast    Contrast agent: Omnipaque 300    Contrast volume (mL): 100    Radiation Dose    Fluoroscopy time ( minutes): 19.4    Reference air kerma ( mGy ): 4634    Kerma area product (micro gray meters squared): 09014    Additional Details    Additional description of procedure: None    Equipment details: None    Specimens removed: None    Estimated blood loss (mL): Less than 10    Standardized report: [id number]_[email or web address]    Attestation    Signer name: Evangelista Stevens MD    I attest that I was present for the entire procedure. I reviewed the stored images and agree with the report as written.                                        Initial MDM   Medical Decision Making  Patient was a 63-year-old male presenting with retroperitoneal hematomas.  IR planning for procedure.  Further  workup and management deferred given emergent nature of the patient's condition.  IR, anesthesia aware of patient's arrival to the ED.    Risk  Prescription drug management.  Decision regarding hospitalization.                     Medications Given / Interventions     Medications   sodium chloride 0.9% flush 10 mL (has no administration in time range)   naloxone 0.4 mg/mL injection 0.02 mg (has no administration in time range)   glucose chewable tablet 16 g (has no administration in time range)   glucose chewable tablet 24 g (has no administration in time range)   dextrose 50% injection 25 g (has no administration in time range)   ondansetron injection 4 mg (4 mg Intravenous Given by Other 1/25/25 2030)   dextrose 50% injection 12.5 g (has no administration in time range)   glucagon (human recombinant) injection 1 mg (has no administration in time range)   buprenorphine-naloxone 8-2 mg SL film 3 Film (3 Film Sublingual Given 1/27/25 0821)   metoprolol succinate (TOPROL-XL) 24 hr tablet 25 mg (25 mg Oral Given 1/27/25 0821)   LIDOcaine 5 % patch 2 patch (2 patches Transdermal Not Given 1/28/25 0445)   methyl salicylate-menthol 15-10% cream ( Topical (Top) Not Given 1/27/25 2100)   polyethylene glycol packet 17 g (17 g Oral Given 1/27/25 2122)   senna-docusate 8.6-50 mg per tablet 1 tablet (1 tablet Oral Given 1/27/25 0821)   mupirocin 2 % ointment ( Nasal Given 1/27/25 2122)   acetaminophen tablet 650 mg (650 mg Oral Given 1/26/25 2105)   clotrimazole-betamethasone 1-0.05% cream ( Topical (Top) Not Given 1/27/25 2100)   0.9%  NaCl infusion (for blood administration) (has no administration in time range)   simethicone chewable tablet 80 mg (80 mg Oral Given 1/26/25 2104)   amLODIPine tablet 10 mg (10 mg Oral Given 1/27/25 0821)   labetalol 20 mg/4 mL (5 mg/mL) IV syring (10 mg Intravenous Given 1/26/25 1807)   lisinopriL tablet 40 mg (40 mg Oral Given 1/27/25 0918)   HYDROmorphone injection 0.5 mg (0.5 mg Intravenous  Given 1/28/25 0218)   oxybutynin 24 hr tablet 5 mg (5 mg Oral Given 1/27/25 1207)   insulin aspart U-100 pen 0-10 Units (1 Units Subcutaneous Given 1/27/25 2120)   insulin glargine U-100 (Lantus) pen 19 Units (19 Units Subcutaneous Given 1/27/25 2119)   oxyCODONE immediate release tablet Tab 10 mg (has no administration in time range)   methocarbamoL tablet 500 mg (has no administration in time range)   heparin infusion 1,000 units/500 ml in 0.9% NaCl (on sterile field) (1,500 mLs Intra-Catheter Given 1/23/25 2205)   iohexoL (OMNIPAQUE 300) injection 100 mL (75 mLs Intra-arterial Given 1/23/25 2319)   acetaminophen tablet 1,000 mg (1,000 mg Oral Given 1/24/25 1718)   iohexoL (OMNIPAQUE 350) injection 100 mL (100 mLs Intravenous Given 1/24/25 2236)   HYDROmorphone injection 0.5 mg (0.5 mg Intravenous Given 1/25/25 1105)   senna-docusate 8.6-50 mg per tablet 1 tablet (1 tablet Oral Given 1/26/25 0804)   aluminum & magnesium hydroxide-simethicone 400-400-40 mg/5 mL suspension 30 mL (30 mLs Oral Given 1/26/25 1203)   amLODIPine tablet 5 mg (5 mg Oral Given 1/26/25 1203)   methylnaltrexone 12 mg/0.6 mL subcutaneous injection 12 mg (12 mg Subcutaneous Given 1/26/25 1407)   oxybutynin tablet 5 mg (5 mg Oral Given 1/27/25 0643)   hydrALAZINE injection 10 mg (10 mg Intravenous Given 1/27/25 1719)   HYDROmorphone injection 0.5 mg (0.5 mg Intravenous Given 1/27/25 1814)       Procedures     ED POCUS Performed: No    Reassessment and ED Course               Final diagnoses:  [K68.3] Retroperitoneal hematoma                 Dispo      ED Disposition Condition    Admit                              Herminia Carey MD  01/28/25 8626     none offered

## 2025-02-03 ENCOUNTER — RX CHANGE (OUTPATIENT)
Age: 75
End: 2025-02-03

## 2025-02-03 RX ORDER — CALCIUM CARBONATE 500(1250)
500 TABLET ORAL
Qty: 90 | Refills: 2 | Status: ACTIVE | COMMUNITY
Start: 1900-01-01 | End: 1900-01-01

## 2025-02-11 ENCOUNTER — APPOINTMENT (OUTPATIENT)
Dept: UROLOGY | Facility: CLINIC | Age: 75
End: 2025-02-11
Payer: MEDICARE

## 2025-02-11 ENCOUNTER — LABORATORY RESULT (OUTPATIENT)
Age: 75
End: 2025-02-11

## 2025-02-11 DIAGNOSIS — N13.30 UNSPECIFIED HYDRONEPHROSIS: ICD-10-CM

## 2025-02-11 PROCEDURE — G2211 COMPLEX E/M VISIT ADD ON: CPT

## 2025-02-11 PROCEDURE — 99215 OFFICE O/P EST HI 40 MIN: CPT

## 2025-02-13 ENCOUNTER — OUTPATIENT (OUTPATIENT)
Dept: OUTPATIENT SERVICES | Facility: HOSPITAL | Age: 75
LOS: 1 days | End: 2025-02-13
Payer: MEDICARE

## 2025-02-13 ENCOUNTER — APPOINTMENT (OUTPATIENT)
Age: 75
End: 2025-02-13

## 2025-02-13 ENCOUNTER — NON-APPOINTMENT (OUTPATIENT)
Age: 75
End: 2025-02-13

## 2025-02-13 DIAGNOSIS — Z90.49 ACQUIRED ABSENCE OF OTHER SPECIFIED PARTS OF DIGESTIVE TRACT: Chronic | ICD-10-CM

## 2025-02-13 DIAGNOSIS — N21.0 CALCULUS IN BLADDER: Chronic | ICD-10-CM

## 2025-02-13 DIAGNOSIS — D64.9 ANEMIA, UNSPECIFIED: ICD-10-CM

## 2025-02-13 LAB
ALBUMIN SERPL ELPH-MCNC: 4.4 G/DL
ALP BLD-CCNC: 127 U/L
ALT SERPL-CCNC: 35 U/L
ANION GAP SERPL CALC-SCNC: 11 MMOL/L
AST SERPL-CCNC: 28 U/L
AUTO BASOPHILS #: 0.06 K/UL
AUTO BASOPHILS %: 0.8 %
AUTO EOSINOPHILS #: 0.32 K/UL
AUTO EOSINOPHILS %: 4 %
AUTO IMMATURE GRANULOCYTES #: 0.04 K/UL
AUTO LYMPHOCYTES #: 1.46 K/UL
AUTO LYMPHOCYTES %: 18.5 %
AUTO MONOCYTES #: 0.86 K/UL
AUTO MONOCYTES %: 10.9 %
AUTO NEUTROPHILS #: 5.17 K/UL
AUTO NEUTROPHILS %: 65.3 %
AUTO NRBC #: 0 K/UL
BILIRUB DIRECT SERPL-MCNC: 0.3 MG/DL
BILIRUB INDIRECT SERPL-MCNC: 0.4 MG/DL
BILIRUB SERPL-MCNC: 0.7 MG/DL
BUN SERPL-MCNC: 29 MG/DL
CALCIUM SERPL-MCNC: 8.9 MG/DL
CHLORIDE SERPL-SCNC: 105 MMOL/L
CO2 SERPL-SCNC: 23 MMOL/L
CREAT SERPL-MCNC: 1.4 MG/DL
EGFR: 53 ML/MIN/1.73M2
GLUCOSE SERPL-MCNC: 122 MG/DL
HCT VFR BLD CALC: 31.6 %
HGB BLD-MCNC: 10.6 G/DL
IMM GRANULOCYTES NFR BLD AUTO: 0.5 %
MAN DIFF?: NORMAL
MCHC RBC-ENTMCNC: 31.3 PG
MCHC RBC-ENTMCNC: 33.5 G/DL
MCV RBC AUTO: 93.2 FL
PLATELET # BLD AUTO: 201 K/UL
PMV BLD AUTO: 0 /100 WBCS
PMV BLD: 9.1 FL
POTASSIUM SERPL-SCNC: 4.6 MMOL/L
PROT SERPL-MCNC: 6.2 G/DL
RBC # BLD: 3.39 M/UL
RBC # FLD: 13.8 %
SODIUM SERPL-SCNC: 139 MMOL/L
WBC # FLD AUTO: 7.91 K/UL

## 2025-02-13 PROCEDURE — 84153 ASSAY OF PSA TOTAL: CPT

## 2025-02-13 PROCEDURE — 36415 COLL VENOUS BLD VENIPUNCTURE: CPT

## 2025-02-13 PROCEDURE — 80076 HEPATIC FUNCTION PANEL: CPT

## 2025-02-13 PROCEDURE — 80048 BASIC METABOLIC PNL TOTAL CA: CPT

## 2025-02-13 PROCEDURE — 85025 COMPLETE CBC W/AUTO DIFF WBC: CPT

## 2025-02-14 DIAGNOSIS — D64.9 ANEMIA, UNSPECIFIED: ICD-10-CM

## 2025-02-14 LAB — PSA SERPL-MCNC: 2.15 NG/ML

## 2025-02-20 ENCOUNTER — APPOINTMENT (OUTPATIENT)
Age: 75
End: 2025-02-20
Payer: MEDICARE

## 2025-02-20 ENCOUNTER — OUTPATIENT (OUTPATIENT)
Dept: OUTPATIENT SERVICES | Facility: HOSPITAL | Age: 75
LOS: 1 days | End: 2025-02-20
Payer: MEDICARE

## 2025-02-20 VITALS
HEIGHT: 66 IN | RESPIRATION RATE: 16 BRPM | HEART RATE: 83 BPM | TEMPERATURE: 98 F | WEIGHT: 155 LBS | BODY MASS INDEX: 24.91 KG/M2 | OXYGEN SATURATION: 100 % | SYSTOLIC BLOOD PRESSURE: 131 MMHG | DIASTOLIC BLOOD PRESSURE: 89 MMHG

## 2025-02-20 VITALS — SYSTOLIC BLOOD PRESSURE: 131 MMHG | HEART RATE: 83 BPM | TEMPERATURE: 98 F | DIASTOLIC BLOOD PRESSURE: 89 MMHG

## 2025-02-20 DIAGNOSIS — C61 MALIGNANT NEOPLASM OF PROSTATE: ICD-10-CM

## 2025-02-20 DIAGNOSIS — D64.9 ANEMIA, UNSPECIFIED: ICD-10-CM

## 2025-02-20 DIAGNOSIS — Z51.81 ENCOUNTER FOR THERAPEUTIC DRUG LVL MONITORING: ICD-10-CM

## 2025-02-20 DIAGNOSIS — L27.0 GENERALIZED SKIN ERUPTION DUE TO DRUGS AND MEDICAMENTS TAKEN INTERNALLY: ICD-10-CM

## 2025-02-20 DIAGNOSIS — N21.0 CALCULUS IN BLADDER: Chronic | ICD-10-CM

## 2025-02-20 DIAGNOSIS — Z90.49 ACQUIRED ABSENCE OF OTHER SPECIFIED PARTS OF DIGESTIVE TRACT: Chronic | ICD-10-CM

## 2025-02-20 DIAGNOSIS — C79.51 MALIGNANT NEOPLASM OF PROSTATE: ICD-10-CM

## 2025-02-20 PROCEDURE — 99214 OFFICE O/P EST MOD 30 MIN: CPT

## 2025-02-20 PROCEDURE — 96365 THER/PROPH/DIAG IV INF INIT: CPT

## 2025-02-20 PROCEDURE — 99214 OFFICE O/P EST MOD 30 MIN: CPT | Mod: 25

## 2025-02-20 PROCEDURE — G2211 COMPLEX E/M VISIT ADD ON: CPT

## 2025-02-20 RX ORDER — ZOLEDRONIC ACID 0.05 MG/ML
3.3 INJECTION, SOLUTION INTRAVENOUS ONCE
Refills: 0 | Status: COMPLETED | OUTPATIENT
Start: 2025-02-20 | End: 2025-02-20

## 2025-02-20 RX ORDER — NITROFURANTOIN (MONOHYDRATE/MACROCRYSTALS) 25; 75 MG/1; MG/1
100 CAPSULE ORAL
Qty: 14 | Refills: 0 | Status: ACTIVE | COMMUNITY
Start: 2025-02-20 | End: 1900-01-01

## 2025-02-20 RX ORDER — CIPROFLOXACIN HYDROCHLORIDE 500 MG/1
500 TABLET, FILM COATED ORAL
Qty: 14 | Refills: 0 | Status: ACTIVE | COMMUNITY
Start: 2025-02-20 | End: 1900-01-01

## 2025-02-20 RX ADMIN — ZOLEDRONIC ACID 3.3 MILLIGRAM(S): 0.05 INJECTION, SOLUTION INTRAVENOUS at 10:26

## 2025-02-20 RX ADMIN — ZOLEDRONIC ACID 3.3 MILLIGRAM(S): 0.05 INJECTION, SOLUTION INTRAVENOUS at 10:56

## 2025-02-25 NOTE — ED PROVIDER NOTE - PRINCIPAL DIAGNOSIS
Diarrhea
40-year-old male, past medical history of hypertension, presents to ED complaining of sudden onset chest pain 1 hr pta.  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 Pt went home and check his BP was elevated, took his amlodpine and called EMS. Upon EMS arrival blood pressure was noted to be 190s/110s. Pt was given 324mg ASA and Nitro x 3 per EMS, w/ some improvement in pain. Of note, patient had recent admission for similar, underwent left heart cath with no severe CAD found. Echo at that time concerning for global LV dysfunction likely i/s/o LVH. Thus patient was discharged on medical management. Pt denies SOB, HA, vision changes, weakness/numbness, lightheadedness/dizziness, LE edema, palpitations, abdominal pain, n/v/d, or any other symptoms at this time.     Vitals signs significant for hypertension, otherwise wnl. EKG is NSR w/ ST elevations in anterior leads and depressions/TWIs in inferior leads (however, very similar to prior EKG in system). Physical exam unremarkable. Heart is RRR w/o MRG. Lungs CTAB.    STEMI code activated. Discussed case w/ Cardiology Interventionalist on-call, Dr. Valdovinos. Recommending ACS w/u and rpt EKG to assess for dynamic changes. CCU fellow will evaluate. Dispo pending results and cardiology recommendations.

## 2025-02-28 ENCOUNTER — APPOINTMENT (OUTPATIENT)
Dept: CARDIOLOGY | Facility: CLINIC | Age: 75
End: 2025-02-28

## 2025-03-11 ENCOUNTER — APPOINTMENT (OUTPATIENT)
Dept: UROLOGY | Facility: CLINIC | Age: 75
End: 2025-03-11
Payer: MEDICARE

## 2025-03-11 DIAGNOSIS — N40.1 BENIGN PROSTATIC HYPERPLASIA WITH LOWER URINARY TRACT SYMPMS: ICD-10-CM

## 2025-03-11 DIAGNOSIS — N13.30 UNSPECIFIED HYDRONEPHROSIS: ICD-10-CM

## 2025-03-11 DIAGNOSIS — N13.8 BENIGN PROSTATIC HYPERPLASIA WITH LOWER URINARY TRACT SYMPMS: ICD-10-CM

## 2025-03-11 DIAGNOSIS — C61 MALIGNANT NEOPLASM OF PROSTATE: ICD-10-CM

## 2025-03-11 PROCEDURE — 81003 URINALYSIS AUTO W/O SCOPE: CPT | Mod: QW

## 2025-03-11 PROCEDURE — 99214 OFFICE O/P EST MOD 30 MIN: CPT | Mod: 25

## 2025-03-11 PROCEDURE — 52000 CYSTOURETHROSCOPY: CPT

## 2025-03-13 ENCOUNTER — RX RENEWAL (OUTPATIENT)
Age: 75
End: 2025-03-13

## 2025-03-13 ENCOUNTER — NON-APPOINTMENT (OUTPATIENT)
Age: 75
End: 2025-03-13

## 2025-03-13 LAB
BILIRUB UR QL STRIP: NORMAL
COLLECTION METHOD: NORMAL
GLUCOSE UR-MCNC: NORMAL
HCG UR QL: 0.2 EU/DL
HGB UR QL STRIP.AUTO: NORMAL
KETONES UR-MCNC: NORMAL
LEUKOCYTE ESTERASE UR QL STRIP: NORMAL
NITRITE UR QL STRIP: NORMAL
PH UR STRIP: 5.5
PROT UR STRIP-MCNC: 100
SP GR UR STRIP: 1.03

## 2025-03-14 ENCOUNTER — RX RENEWAL (OUTPATIENT)
Age: 75
End: 2025-03-14

## 2025-03-18 ENCOUNTER — OUTPATIENT (OUTPATIENT)
Dept: OUTPATIENT SERVICES | Facility: HOSPITAL | Age: 75
LOS: 1 days | End: 2025-03-18
Payer: MEDICARE

## 2025-03-18 ENCOUNTER — APPOINTMENT (OUTPATIENT)
Age: 75
End: 2025-03-18

## 2025-03-18 DIAGNOSIS — N21.0 CALCULUS IN BLADDER: Chronic | ICD-10-CM

## 2025-03-18 DIAGNOSIS — D64.9 ANEMIA, UNSPECIFIED: ICD-10-CM

## 2025-03-18 DIAGNOSIS — Z90.49 ACQUIRED ABSENCE OF OTHER SPECIFIED PARTS OF DIGESTIVE TRACT: Chronic | ICD-10-CM

## 2025-03-18 LAB
ALBUMIN SERPL ELPH-MCNC: 4.4 G/DL
ALP BLD-CCNC: 146 U/L
ALT SERPL-CCNC: 59 U/L
ANION GAP SERPL CALC-SCNC: 12 MMOL/L
AST SERPL-CCNC: 44 U/L
AUTO BASOPHILS #: 0.06 K/UL
AUTO BASOPHILS %: 0.8 %
AUTO EOSINOPHILS #: 0.33 K/UL
AUTO EOSINOPHILS %: 4.3 %
AUTO IMMATURE GRANULOCYTES #: 0.02 K/UL
AUTO LYMPHOCYTES #: 1.41 K/UL
AUTO LYMPHOCYTES %: 18.4 %
AUTO MONOCYTES #: 0.8 K/UL
AUTO MONOCYTES %: 10.4 %
AUTO NEUTROPHILS #: 5.06 K/UL
AUTO NEUTROPHILS %: 65.8 %
AUTO NRBC #: 0 K/UL
BILIRUB DIRECT SERPL-MCNC: <0.2 MG/DL
BILIRUB INDIRECT SERPL-MCNC: >0.3 MG/DL
BILIRUB SERPL-MCNC: 0.5 MG/DL
BUN SERPL-MCNC: 30 MG/DL
CALCIUM SERPL-MCNC: 8.9 MG/DL
CHLORIDE SERPL-SCNC: 107 MMOL/L
CO2 SERPL-SCNC: 22 MMOL/L
CREAT SERPL-MCNC: 1.3 MG/DL
EGFRCR SERPLBLD CKD-EPI 2021: 58 ML/MIN/1.73M2
GLUCOSE SERPL-MCNC: 121 MG/DL
HCT VFR BLD CALC: 33.1 %
HGB BLD-MCNC: 11 G/DL
IMM GRANULOCYTES NFR BLD AUTO: 0.3 %
MAN DIFF?: NORMAL
MCHC RBC-ENTMCNC: 31 PG
MCHC RBC-ENTMCNC: 33.2 G/DL
MCV RBC AUTO: 93.2 FL
PLATELET # BLD AUTO: 236 K/UL
PMV BLD AUTO: 0 /100 WBCS
PMV BLD: 9 FL
POTASSIUM SERPL-SCNC: 4.9 MMOL/L
PROT SERPL-MCNC: 6.4 G/DL
RBC # BLD: 3.55 M/UL
RBC # FLD: 13.2 %
SODIUM SERPL-SCNC: 141 MMOL/L
WBC # FLD AUTO: 7.68 K/UL

## 2025-03-18 PROCEDURE — 85025 COMPLETE CBC W/AUTO DIFF WBC: CPT

## 2025-03-18 PROCEDURE — 84153 ASSAY OF PSA TOTAL: CPT

## 2025-03-18 PROCEDURE — 36415 COLL VENOUS BLD VENIPUNCTURE: CPT

## 2025-03-18 PROCEDURE — 80048 BASIC METABOLIC PNL TOTAL CA: CPT

## 2025-03-18 PROCEDURE — 80076 HEPATIC FUNCTION PANEL: CPT

## 2025-03-19 ENCOUNTER — OUTPATIENT (OUTPATIENT)
Dept: OUTPATIENT SERVICES | Facility: HOSPITAL | Age: 75
LOS: 1 days | End: 2025-03-19
Payer: MEDICARE

## 2025-03-19 VITALS
DIASTOLIC BLOOD PRESSURE: 78 MMHG | RESPIRATION RATE: 18 BRPM | SYSTOLIC BLOOD PRESSURE: 111 MMHG | HEIGHT: 67 IN | HEART RATE: 98 BPM | TEMPERATURE: 99 F | OXYGEN SATURATION: 99 % | WEIGHT: 153 LBS

## 2025-03-19 DIAGNOSIS — Z90.49 ACQUIRED ABSENCE OF OTHER SPECIFIED PARTS OF DIGESTIVE TRACT: Chronic | ICD-10-CM

## 2025-03-19 DIAGNOSIS — D64.9 ANEMIA, UNSPECIFIED: ICD-10-CM

## 2025-03-19 DIAGNOSIS — N13.30 UNSPECIFIED HYDRONEPHROSIS: ICD-10-CM

## 2025-03-19 DIAGNOSIS — Z01.818 ENCOUNTER FOR OTHER PREPROCEDURAL EXAMINATION: ICD-10-CM

## 2025-03-19 DIAGNOSIS — N21.0 CALCULUS IN BLADDER: Chronic | ICD-10-CM

## 2025-03-19 LAB
ALBUMIN SERPL ELPH-MCNC: 4.3 G/DL — SIGNIFICANT CHANGE UP (ref 3.5–5.2)
ALP SERPL-CCNC: 138 U/L — HIGH (ref 30–115)
ALT FLD-CCNC: 62 U/L — HIGH (ref 0–41)
ANION GAP SERPL CALC-SCNC: 13 MMOL/L — SIGNIFICANT CHANGE UP (ref 7–14)
APPEARANCE UR: ABNORMAL
APTT BLD: 33.9 SEC — SIGNIFICANT CHANGE UP (ref 27–39.2)
AST SERPL-CCNC: 47 U/L — HIGH (ref 0–41)
BACTERIA # UR AUTO: ABNORMAL /HPF
BILIRUB SERPL-MCNC: 0.4 MG/DL — SIGNIFICANT CHANGE UP (ref 0.2–1.2)
BILIRUB UR-MCNC: NEGATIVE — SIGNIFICANT CHANGE UP
BUN SERPL-MCNC: 29 MG/DL — HIGH (ref 10–20)
CALCIUM SERPL-MCNC: 9.2 MG/DL — SIGNIFICANT CHANGE UP (ref 8.4–10.5)
CAST: 5 /LPF — HIGH (ref 0–4)
CHLORIDE SERPL-SCNC: 108 MMOL/L — SIGNIFICANT CHANGE UP (ref 98–110)
CO2 SERPL-SCNC: 20 MMOL/L — SIGNIFICANT CHANGE UP (ref 17–32)
COLOR SPEC: YELLOW — SIGNIFICANT CHANGE UP
CREAT SERPL-MCNC: 1.4 MG/DL — SIGNIFICANT CHANGE UP (ref 0.7–1.5)
DIFF PNL FLD: ABNORMAL
EGFR: 53 ML/MIN/1.73M2 — LOW
EGFR: 53 ML/MIN/1.73M2 — LOW
GLUCOSE SERPL-MCNC: 120 MG/DL — HIGH (ref 70–99)
GLUCOSE UR QL: NEGATIVE MG/DL — SIGNIFICANT CHANGE UP
KETONES UR-MCNC: NEGATIVE MG/DL — SIGNIFICANT CHANGE UP
LEUKOCYTE ESTERASE UR-ACNC: ABNORMAL
NITRITE UR-MCNC: POSITIVE
PH UR: 5.5 — SIGNIFICANT CHANGE UP (ref 5–8)
POTASSIUM SERPL-MCNC: 4.4 MMOL/L — SIGNIFICANT CHANGE UP (ref 3.5–5)
POTASSIUM SERPL-SCNC: 4.4 MMOL/L — SIGNIFICANT CHANGE UP (ref 3.5–5)
PROT SERPL-MCNC: 6.7 G/DL — SIGNIFICANT CHANGE UP (ref 6–8)
PROT UR-MCNC: 100 MG/DL
PROTHROM AB SERPL-ACNC: 10.9 SEC — SIGNIFICANT CHANGE UP (ref 9.95–12.87)
PSA SERPL-MCNC: 5.83 NG/ML
RBC CASTS # UR COMP ASSIST: 3 /HPF — SIGNIFICANT CHANGE UP (ref 0–4)
SODIUM SERPL-SCNC: 141 MMOL/L — SIGNIFICANT CHANGE UP (ref 135–146)
SP GR SPEC: 1.02 — SIGNIFICANT CHANGE UP (ref 1–1.03)
SQUAMOUS # UR AUTO: 2 /HPF — SIGNIFICANT CHANGE UP (ref 0–5)
UROBILINOGEN FLD QL: 0.2 MG/DL — SIGNIFICANT CHANGE UP (ref 0.2–1)
WBC UR QL: 419 /HPF — HIGH (ref 0–5)

## 2025-03-19 PROCEDURE — 99214 OFFICE O/P EST MOD 30 MIN: CPT | Mod: 25

## 2025-03-19 PROCEDURE — 85730 THROMBOPLASTIN TIME PARTIAL: CPT

## 2025-03-19 PROCEDURE — 87186 SC STD MICRODIL/AGAR DIL: CPT

## 2025-03-19 PROCEDURE — 93010 ELECTROCARDIOGRAM REPORT: CPT

## 2025-03-19 PROCEDURE — 87086 URINE CULTURE/COLONY COUNT: CPT

## 2025-03-19 PROCEDURE — 87077 CULTURE AEROBIC IDENTIFY: CPT

## 2025-03-19 PROCEDURE — 80053 COMPREHEN METABOLIC PANEL: CPT

## 2025-03-19 PROCEDURE — 85610 PROTHROMBIN TIME: CPT

## 2025-03-19 PROCEDURE — 81001 URINALYSIS AUTO W/SCOPE: CPT

## 2025-03-19 PROCEDURE — 93005 ELECTROCARDIOGRAM TRACING: CPT

## 2025-03-19 PROCEDURE — 36415 COLL VENOUS BLD VENIPUNCTURE: CPT

## 2025-03-19 NOTE — H&P PST ADULT - HISTORY OF PRESENT ILLNESS
PATIENT CURRENTLY DENIES CHEST PAIN  SHORTNESS OF BREATH  PALPITATIONS,  DYSURIA, OR UPPER RESPIRATORY INFECTION IN PAST 2 WEEKS  74 YEARS OLD  WITH A NEPHROSTOMY TUBE SINCE 3/2024, CURRENTLY WITH NEPHROSTOMY TUBE CAPPED, URINATING BY MALGORZATA AT PRESENT, SURGERY ABOVE SCHEDULLED.    Denies travel outside the [place] in the past 30 days  Patient denies any signs or symptoms of COVID 19 and denies contact with known positive individuals.         Anesthesia Alert  YES--Difficult Airway CLASS III  NO--History of neck surgery or radiation  NO--Limited ROM of neck  NO--History of Malignant hyperthermia  NO--No personal or family history of Pseudocholinesterase deficiency.  NO--Prior Anesthesia Complication  NO--Latex Allergy  NO--Loose teeth  NO--History of Rheumatoid Arthritis  NO--Bleeding risk  NO--TAYO  NO--Other_____    DASI 8.33    RCRI 0    PT DENIES ANY RASHES, ABRASION, OR OPEN WOUNDS OR CUTS    AS PER THE PT, THIS IS HIS/HER COMPLETE MEDICAL AND SURGICAL HX, INCLUDING MEDICATIONS PRESCRIBED AND OVER THE COUNTER    Patient/Guardian understands the instructions and was given the opportunity to ask questions and have them answered.    pt denies any suicidal ideation or thoughts, pt states not a threat to self or others   PATIENT CURRENTLY DENIES CHEST PAIN  SHORTNESS OF BREATH  PALPITATIONS,  DYSURIA, OR UPPER RESPIRATORY INFECTION IN PAST 2 WEEKS  74 YEARS OLD  WITH A HISTORY OF PROSTATE CANCER, BPH, HAVING PROBLEM URINATING, HAD NEPHROSTOMY TUBE SINCE 3/2024, CURRENTLY WITH THE NEPHROSTOMY TUBE CAPPED, URINATING BY HIMSELF AT PRESENT, SURGERY ABOVE SCHEDULLED.    Denies travel outside the [place] in the past 30 days  Patient denies any signs or symptoms of COVID 19 and denies contact with known positive individuals.         Anesthesia Alert  YES--Difficult Airway CLASS III  NO--History of neck surgery or radiation  NO--Limited ROM of neck  NO--History of Malignant hyperthermia  NO--No personal or family history of Pseudocholinesterase deficiency.  NO--Prior Anesthesia Complication  NO--Latex Allergy  NO--Loose teeth  NO--History of Rheumatoid Arthritis  NO--Bleeding risk  NO--[place]  NO--Other_____    DASI 8.33    RCRI 0    PT DENIES ANY RASHES, ABRASION, OR OPEN WOUNDS OR CUTS    AS PER THE PT, THIS IS HIS/HER COMPLETE MEDICAL AND SURGICAL HX, INCLUDING MEDICATIONS PRESCRIBED AND OVER THE COUNTER    Patient/Guardian understands the instructions and was given the opportunity to ask questions and have them answered.    pt denies any suicidal ideation or thoughts, pt states not a threat to self or others

## 2025-03-19 NOTE — H&P PST ADULT - REASON FOR ADMISSION
Case Type: OP Block TimeSuite[facility] Luca Zhang  Confirmed Surgery DateTime: 04- - 0:00PAST DateTime: 03- - 7:30Procedure: CYSTOSCOPY, LEFT URETERAL STENT PLACEMENT, LEFT PERCUTANEOUS NEPHROSTOMY STENT REMOVAL  ERP?: NoLaterality: LeftLength of Procedure: 30 Minutes  Anesthesia Type: General

## 2025-03-20 ENCOUNTER — APPOINTMENT (OUTPATIENT)
Age: 75
End: 2025-03-20
Payer: MEDICARE

## 2025-03-20 ENCOUNTER — OUTPATIENT (OUTPATIENT)
Dept: OUTPATIENT SERVICES | Facility: HOSPITAL | Age: 75
LOS: 1 days | End: 2025-03-20
Payer: MEDICARE

## 2025-03-20 VITALS
DIASTOLIC BLOOD PRESSURE: 79 MMHG | BODY MASS INDEX: 24.93 KG/M2 | HEIGHT: 66.5 IN | HEART RATE: 95 BPM | WEIGHT: 157 LBS | OXYGEN SATURATION: 100 % | SYSTOLIC BLOOD PRESSURE: 115 MMHG | TEMPERATURE: 98.4 F | RESPIRATION RATE: 16 BRPM

## 2025-03-20 DIAGNOSIS — Z51.81 ENCOUNTER FOR THERAPEUTIC DRUG LVL MONITORING: ICD-10-CM

## 2025-03-20 DIAGNOSIS — Z79.818 ENCOUNTER FOR THERAPEUTIC DRUG LVL MONITORING: ICD-10-CM

## 2025-03-20 DIAGNOSIS — D64.9 ANEMIA, UNSPECIFIED: ICD-10-CM

## 2025-03-20 DIAGNOSIS — L27.0 GENERALIZED SKIN ERUPTION DUE TO DRUGS AND MEDICAMENTS TAKEN INTERNALLY: ICD-10-CM

## 2025-03-20 DIAGNOSIS — R79.89 OTHER SPECIFIED ABNORMAL FINDINGS OF BLOOD CHEMISTRY: ICD-10-CM

## 2025-03-20 DIAGNOSIS — C79.51 MALIGNANT NEOPLASM OF PROSTATE: ICD-10-CM

## 2025-03-20 DIAGNOSIS — Z01.818 ENCOUNTER FOR OTHER PREPROCEDURAL EXAMINATION: ICD-10-CM

## 2025-03-20 DIAGNOSIS — C61 MALIGNANT NEOPLASM OF PROSTATE: ICD-10-CM

## 2025-03-20 DIAGNOSIS — N13.30 UNSPECIFIED HYDRONEPHROSIS: ICD-10-CM

## 2025-03-20 PROCEDURE — 99215 OFFICE O/P EST HI 40 MIN: CPT

## 2025-03-20 PROCEDURE — 96402 CHEMO HORMON ANTINEOPL SQ/IM: CPT

## 2025-03-20 PROCEDURE — G2211 COMPLEX E/M VISIT ADD ON: CPT

## 2025-03-20 PROCEDURE — 96365 THER/PROPH/DIAG IV INF INIT: CPT

## 2025-03-20 RX ORDER — PREDNISONE 5 MG/1
5 TABLET ORAL
Qty: 60 | Refills: 6 | Status: ACTIVE | COMMUNITY
Start: 2025-03-20 | End: 1900-01-01

## 2025-03-20 RX ORDER — ZOLEDRONIC ACID 0.05 MG/ML
3.5 INJECTION, SOLUTION INTRAVENOUS ONCE
Refills: 0 | Status: COMPLETED | OUTPATIENT
Start: 2025-03-20 | End: 2025-03-20

## 2025-03-20 RX ORDER — LEUPROLIDE 42 MG/.37G
22.5 INJECTION, EMULSION SUBCUTANEOUS ONCE
Refills: 0 | Status: COMPLETED | OUTPATIENT
Start: 2025-03-20 | End: 2025-03-20

## 2025-03-20 RX ORDER — ABIRATERONE ACETATE 500 MG/1
500 TABLET, FILM COATED ORAL DAILY
Qty: 60 | Refills: 6 | Status: ACTIVE | COMMUNITY
Start: 2025-03-20 | End: 1900-01-01

## 2025-03-20 RX ADMIN — ZOLEDRONIC ACID 3.5 MILLIGRAM(S): 0.05 INJECTION, SOLUTION INTRAVENOUS at 11:00

## 2025-03-20 RX ADMIN — ZOLEDRONIC ACID 3.5 MILLIGRAM(S): 0.05 INJECTION, SOLUTION INTRAVENOUS at 10:27

## 2025-03-20 RX ADMIN — LEUPROLIDE 22.5 MILLIGRAM(S): 42 INJECTION, EMULSION SUBCUTANEOUS at 11:02

## 2025-03-22 LAB
-  AMOXICILLIN/CLAVULANIC ACID: SIGNIFICANT CHANGE UP
-  AMPICILLIN/SULBACTAM: SIGNIFICANT CHANGE UP
-  AMPICILLIN: SIGNIFICANT CHANGE UP
-  AZTREONAM: SIGNIFICANT CHANGE UP
-  CEFAZOLIN: SIGNIFICANT CHANGE UP
-  CEFEPIME: SIGNIFICANT CHANGE UP
-  CEFOXITIN: SIGNIFICANT CHANGE UP
-  CEFTRIAXONE: SIGNIFICANT CHANGE UP
-  CIPROFLOXACIN: SIGNIFICANT CHANGE UP
-  ERTAPENEM: SIGNIFICANT CHANGE UP
-  GENTAMICIN: SIGNIFICANT CHANGE UP
-  IMIPENEM: SIGNIFICANT CHANGE UP
-  LEVOFLOXACIN: SIGNIFICANT CHANGE UP
-  MEROPENEM: SIGNIFICANT CHANGE UP
-  NITROFURANTOIN: SIGNIFICANT CHANGE UP
-  PIPERACILLIN/TAZOBACTAM: SIGNIFICANT CHANGE UP
-  TOBRAMYCIN: SIGNIFICANT CHANGE UP
-  TRIMETHOPRIM/SULFAMETHOXAZOLE: SIGNIFICANT CHANGE UP
CULTURE RESULTS: ABNORMAL
METHOD TYPE: SIGNIFICANT CHANGE UP
ORGANISM # SPEC MICROSCOPIC CNT: ABNORMAL
ORGANISM # SPEC MICROSCOPIC CNT: SIGNIFICANT CHANGE UP
SPECIMEN SOURCE: SIGNIFICANT CHANGE UP

## 2025-03-26 ENCOUNTER — APPOINTMENT (OUTPATIENT)
Age: 75
End: 2025-03-26

## 2025-03-26 ENCOUNTER — OUTPATIENT (OUTPATIENT)
Dept: OUTPATIENT SERVICES | Facility: HOSPITAL | Age: 75
LOS: 1 days | End: 2025-03-26
Payer: MEDICARE

## 2025-03-26 DIAGNOSIS — Z90.49 ACQUIRED ABSENCE OF OTHER SPECIFIED PARTS OF DIGESTIVE TRACT: Chronic | ICD-10-CM

## 2025-03-26 DIAGNOSIS — N21.0 CALCULUS IN BLADDER: Chronic | ICD-10-CM

## 2025-03-26 DIAGNOSIS — C61 MALIGNANT NEOPLASM OF PROSTATE: ICD-10-CM

## 2025-03-26 LAB
ALBUMIN SERPL ELPH-MCNC: 4.4 G/DL
ALP BLD-CCNC: 175 U/L
ALT SERPL-CCNC: 51 U/L
AST SERPL-CCNC: 29 U/L
BILIRUB DIRECT SERPL-MCNC: <0.2 MG/DL
BILIRUB INDIRECT SERPL-MCNC: >0.1 MG/DL
BILIRUB SERPL-MCNC: 0.3 MG/DL
GGT SERPL-CCNC: 32 U/L
PROT SERPL-MCNC: 6.6 G/DL

## 2025-03-26 PROCEDURE — 36415 COLL VENOUS BLD VENIPUNCTURE: CPT

## 2025-03-26 PROCEDURE — 80076 HEPATIC FUNCTION PANEL: CPT

## 2025-03-26 PROCEDURE — 82977 ASSAY OF GGT: CPT

## 2025-03-27 ENCOUNTER — NON-APPOINTMENT (OUTPATIENT)
Age: 75
End: 2025-03-27

## 2025-03-27 DIAGNOSIS — C61 MALIGNANT NEOPLASM OF PROSTATE: ICD-10-CM

## 2025-03-27 RX ORDER — SULFAMETHOXAZOLE AND TRIMETHOPRIM 800; 160 MG/1; MG/1
800-160 TABLET ORAL
Qty: 14 | Refills: 0 | Status: ACTIVE | COMMUNITY
Start: 2025-03-27 | End: 1900-01-01

## 2025-04-04 ENCOUNTER — OUTPATIENT (OUTPATIENT)
Dept: OUTPATIENT SERVICES | Facility: HOSPITAL | Age: 75
LOS: 1 days | Discharge: HOME | End: 2025-04-04
Payer: MEDICARE

## 2025-04-04 ENCOUNTER — TRANSCRIPTION ENCOUNTER (OUTPATIENT)
Age: 75
End: 2025-04-04

## 2025-04-04 ENCOUNTER — APPOINTMENT (OUTPATIENT)
Dept: UROLOGY | Facility: HOSPITAL | Age: 75
End: 2025-04-04

## 2025-04-04 VITALS
RESPIRATION RATE: 18 BRPM | SYSTOLIC BLOOD PRESSURE: 120 MMHG | OXYGEN SATURATION: 99 % | HEART RATE: 93 BPM | TEMPERATURE: 98 F | DIASTOLIC BLOOD PRESSURE: 80 MMHG | HEIGHT: 67 IN | WEIGHT: 154.98 LBS

## 2025-04-04 VITALS — RESPIRATION RATE: 17 BRPM | DIASTOLIC BLOOD PRESSURE: 78 MMHG | HEART RATE: 93 BPM | SYSTOLIC BLOOD PRESSURE: 128 MMHG

## 2025-04-04 DIAGNOSIS — N13.30 UNSPECIFIED HYDRONEPHROSIS: ICD-10-CM

## 2025-04-04 DIAGNOSIS — Z90.49 ACQUIRED ABSENCE OF OTHER SPECIFIED PARTS OF DIGESTIVE TRACT: Chronic | ICD-10-CM

## 2025-04-04 DIAGNOSIS — I10 ESSENTIAL (PRIMARY) HYPERTENSION: ICD-10-CM

## 2025-04-04 DIAGNOSIS — N21.0 CALCULUS IN BLADDER: Chronic | ICD-10-CM

## 2025-04-04 DIAGNOSIS — E78.00 PURE HYPERCHOLESTEROLEMIA, UNSPECIFIED: ICD-10-CM

## 2025-04-04 DIAGNOSIS — Z85.46 PERSONAL HISTORY OF MALIGNANT NEOPLASM OF PROSTATE: ICD-10-CM

## 2025-04-04 PROCEDURE — C1758: CPT

## 2025-04-04 PROCEDURE — C2617: CPT

## 2025-04-04 PROCEDURE — C1769: CPT

## 2025-04-04 PROCEDURE — 50389 REMOVE RENAL TUBE W/FLUORO: CPT | Mod: LT

## 2025-04-04 PROCEDURE — 74420 UROGRAPHY RTRGR +-KUB: CPT | Mod: 26,LT

## 2025-04-04 PROCEDURE — 72170 X-RAY EXAM OF PELVIS: CPT

## 2025-04-04 PROCEDURE — 52332 CYSTOSCOPY AND TREATMENT: CPT | Mod: LT

## 2025-04-04 NOTE — ASU DISCHARGE PLAN (ADULT/PEDIATRIC) - NS MD DC FALL RISK RISK
For information on Fall & Injury Prevention, visit: [email or web address] OR  [email or web address] OR  https://www.cdc.gov/steadi/patient.html

## 2025-04-04 NOTE — ASU PREOP CHECKLIST - NS PREOP CHK HIBICLENS NA
Assessment:    Diagnosis Orders   1. Chest pain, unspecified type      2. Mixed hyperlipidemia      3. Essential hypertension      4. Gastroesophageal reflux disease, esophagitis presence not specified         Recommendation:  - His chest pain has typical and atypical features. Occasionally he has it while moving his lawn, other times it is more related to food intake. His EKG done in the office today showed sinus rhythm with no ischemic ST changes. He has low pretest probability for CAD and will risk stratify with stress echocardiogram.  - His last LDL from Apr 18' was 150. His 10 year ASCVD risk is 6.3% and he does not need statin therapy. He will continue with therapeutic life style changes. - His BP is still elevated and I instructed him to increase his Lisinopril to full tablet daily. He was taking half tablet of 20 mg.   - I will see him back based on his test results.        If you have questions, please do not hesitate to call me. I look forward to following Matt Motley along with you. #1:

## 2025-04-04 NOTE — ASU PATIENT PROFILE, ADULT - ANESTHESIA, PREVIOUS REACTION, PROFILE
December 18, 2023    To Whom It May Concern:         This is confirmation that Brandy Glass Darvin attended her scheduled appointment with JOSSE Gabriel on 12/18/23.         Please excuse her from school 12/18/23-12/19/23.    Sincerely,      TABATHA Gabriel.  [phone]                   none

## 2025-04-04 NOTE — ASU DISCHARGE PLAN (ADULT/PEDIATRIC) - ASU DC SPECIAL INSTRUCTIONSFT
You had a ureteral stent placed in your ureter to help keep the ureter open post operatively. You can take ibuprofen (advil/motrin) and add tylenol as needed for pain control.   Please complete the antibiotic course which you were taking pre-operatively.    's office will call you for a follow up appointment.  left nephrostomy dressing can be changed daily and once hole closed dressing changes no longer needed.

## 2025-04-04 NOTE — ASU DISCHARGE PLAN (ADULT/PEDIATRIC) - FINANCIAL ASSISTANCE
[facility] provides services at a reduced cost to those who are determined to be eligible through [facility]’s financial assistance program. Information regarding [facility]’s financial assistance program can be found by going to [email or web address] or by calling [phone].

## 2025-04-04 NOTE — BRIEF OPERATIVE NOTE - OPERATION/FINDINGS
sp cysto left ureteral stent insertion. left PCNU removal.    next time can try optima inlay.  stent exchange next 3 mo

## 2025-04-07 ENCOUNTER — NON-APPOINTMENT (OUTPATIENT)
Age: 75
End: 2025-04-07

## 2025-04-15 ENCOUNTER — NON-APPOINTMENT (OUTPATIENT)
Age: 75
End: 2025-04-15

## 2025-05-01 NOTE — PATIENT PROFILE ADULT - PRO INTERPRETER NEED 2
05/01/2025  Shayna Ledezma is a 76 y.o., female.      Pre-op Assessment    I have reviewed the Patient Summary Reports.     I have reviewed the Nursing Notes. I have reviewed the NPO Status.   I have reviewed the Medications.     Review of Systems  Anesthesia Hx:  No problems with previous Anesthesia                Social:  Non-Smoker       Hematology/Oncology:                      Current/Recent Cancer.         chemotherapy, radiation and surgery       Cardiovascular:     Hypertension, well controlled                                          Pulmonary:   COPD, mild                     Hepatic/GI:      Liver Disease,               Neurological:  TIA,                                     Endocrine:  Endocrine Normal            Psych:  Psychiatric Normal                    Physical Exam  General: Cooperative, Alert and Oriented    Airway:  Mallampati: I   Mouth Opening: Normal  TM Distance: Normal  Neck ROM: Normal ROM    Dental:  Intact    Chest/Lungs:  Clear to auscultation    Heart:  Rate: Normal  Rhythm: Regular Rhythm  Sounds: Normal        Anesthesia Plan  Type of Anesthesia, risks & benefits discussed:    Anesthesia Type: Gen Supraglottic Airway  Intra-op Monitoring Plan: Standard ASA Monitors  Post Op Pain Control Plan: multimodal analgesia  Airway Plan: Direct, Post-Induction  ASA Score: 2  Day of Surgery Review of History & Physical: H&P Update referred to the surgeon/provider.    Ready For Surgery From Anesthesia Perspective.     .       English

## 2025-05-16 ENCOUNTER — APPOINTMENT (OUTPATIENT)
Dept: CARDIOLOGY | Facility: CLINIC | Age: 75
End: 2025-05-16
Payer: MEDICARE

## 2025-05-16 VITALS
HEART RATE: 82 BPM | SYSTOLIC BLOOD PRESSURE: 114 MMHG | DIASTOLIC BLOOD PRESSURE: 76 MMHG | HEIGHT: 66.5 IN | WEIGHT: 158 LBS | BODY MASS INDEX: 25.09 KG/M2

## 2025-05-16 DIAGNOSIS — I10 ESSENTIAL (PRIMARY) HYPERTENSION: ICD-10-CM

## 2025-05-16 DIAGNOSIS — I25.10 ATHEROSCLEROTIC HEART DISEASE OF NATIVE CORONARY ARTERY W/OUT ANGINA PECTORIS: ICD-10-CM

## 2025-05-16 DIAGNOSIS — R94.31 ABNORMAL ELECTROCARDIOGRAM [ECG] [EKG]: ICD-10-CM

## 2025-05-16 DIAGNOSIS — I45.10 UNSPECIFIED RIGHT BUNDLE-BRANCH BLOCK: ICD-10-CM

## 2025-05-16 DIAGNOSIS — E78.5 HYPERLIPIDEMIA, UNSPECIFIED: ICD-10-CM

## 2025-05-16 PROCEDURE — G2211 COMPLEX E/M VISIT ADD ON: CPT

## 2025-05-16 PROCEDURE — 93000 ELECTROCARDIOGRAM COMPLETE: CPT

## 2025-05-16 PROCEDURE — 99214 OFFICE O/P EST MOD 30 MIN: CPT

## 2025-05-16 RX ORDER — PNV NO.95/FERROUS FUM/FOLIC AC 28MG-0.8MG
TABLET ORAL
Refills: 0 | Status: ACTIVE | COMMUNITY

## 2025-05-16 RX ORDER — ASPIRIN 81 MG
81 TABLET, DELAYED RELEASE (ENTERIC COATED) ORAL
Refills: 0 | Status: ACTIVE | COMMUNITY

## 2025-05-21 ENCOUNTER — NON-APPOINTMENT (OUTPATIENT)
Age: 75
End: 2025-05-21

## 2025-06-02 ENCOUNTER — APPOINTMENT (OUTPATIENT)
Age: 75
End: 2025-06-02

## 2025-06-02 NOTE — ASU PREOP CHECKLIST - AS BP NONINV SITE
Detail Level: Zone Sunscreen Recommendations: Photoprotective behaviors, including avoiding the sun during peak UV times of the day (e.g. 10 am - 2 pm in the winter), UPF clothing, and sunscreen SPF 30 or greater reapplied every 2 hours, can minimize further damage from ultraviolet sun radiation. Nicotinamide Supplementation Recommendations: I counseled the patient on difference between niacin and niacinamide and recommended 500 mg BID of niacinamide/nicotinamide to reduce the risk of pre-cancers and non-melanoma skin cancer. Heliocare advanced is another option that contains polypodium leukotomas and niacinamide 500 mg. Polypodium leukotomas has been reported to lower UV-induced DNA damage. 80/right upper arm

## 2025-06-05 ENCOUNTER — APPOINTMENT (OUTPATIENT)
Age: 75
End: 2025-06-05

## 2025-06-17 ENCOUNTER — OUTPATIENT (OUTPATIENT)
Dept: OUTPATIENT SERVICES | Facility: HOSPITAL | Age: 75
LOS: 1 days | End: 2025-06-17
Payer: MEDICARE

## 2025-06-17 ENCOUNTER — APPOINTMENT (OUTPATIENT)
Age: 75
End: 2025-06-17

## 2025-06-17 DIAGNOSIS — Z90.49 ACQUIRED ABSENCE OF OTHER SPECIFIED PARTS OF DIGESTIVE TRACT: Chronic | ICD-10-CM

## 2025-06-17 DIAGNOSIS — C61 MALIGNANT NEOPLASM OF PROSTATE: ICD-10-CM

## 2025-06-17 DIAGNOSIS — N21.0 CALCULUS IN BLADDER: Chronic | ICD-10-CM

## 2025-06-17 LAB
ALBUMIN SERPL ELPH-MCNC: 4.2 G/DL
ALP BLD-CCNC: 209 U/L
ALT SERPL-CCNC: 33 U/L
ANION GAP SERPL CALC-SCNC: 13 MMOL/L
AST SERPL-CCNC: 22 U/L
AUTO BASOPHILS #: 0.02 K/UL
AUTO BASOPHILS %: 0.2 %
AUTO EOSINOPHILS #: 0.07 K/UL
AUTO EOSINOPHILS %: 0.7 %
AUTO IMMATURE GRANULOCYTES #: 0.23 K/UL
AUTO LYMPHOCYTES #: 1.31 K/UL
AUTO LYMPHOCYTES %: 12.5 %
AUTO MONOCYTES #: 0.9 K/UL
AUTO MONOCYTES %: 8.6 %
AUTO NEUTROPHILS #: 7.95 K/UL
AUTO NEUTROPHILS %: 75.8 %
AUTO NRBC #: 0 K/UL
BILIRUB DIRECT SERPL-MCNC: 0.2 MG/DL
BILIRUB INDIRECT SERPL-MCNC: 0.3 MG/DL
BILIRUB SERPL-MCNC: 0.5 MG/DL
BUN SERPL-MCNC: 32 MG/DL
CALCIUM SERPL-MCNC: 9 MG/DL
CHLORIDE SERPL-SCNC: 102 MMOL/L
CO2 SERPL-SCNC: 20 MMOL/L
CREAT SERPL-MCNC: 1.5 MG/DL
EGFRCR SERPLBLD CKD-EPI 2021: 49 ML/MIN/1.73M2
GGT SERPL-CCNC: 18 U/L
GLUCOSE SERPL-MCNC: 119 MG/DL
HCT VFR BLD CALC: 31.1 %
HGB BLD-MCNC: 10.5 G/DL
IMM GRANULOCYTES NFR BLD AUTO: 2.2 %
MAN DIFF?: NORMAL
MCHC RBC-ENTMCNC: 31.9 PG
MCHC RBC-ENTMCNC: 33.8 G/DL
MCV RBC AUTO: 94.5 FL
PLATELET # BLD AUTO: 179 K/UL
PMV BLD AUTO: 0 /100 WBCS
PMV BLD: 8.7 FL
POTASSIUM SERPL-SCNC: 4.8 MMOL/L
PROT SERPL-MCNC: 6.1 G/DL
RBC # BLD: 3.29 M/UL
RBC # FLD: 14.2 %
SODIUM SERPL-SCNC: 135 MMOL/L
WBC # FLD AUTO: 10.48 K/UL

## 2025-06-17 PROCEDURE — 80048 BASIC METABOLIC PNL TOTAL CA: CPT

## 2025-06-17 PROCEDURE — 82977 ASSAY OF GGT: CPT

## 2025-06-17 PROCEDURE — 80076 HEPATIC FUNCTION PANEL: CPT

## 2025-06-17 PROCEDURE — 84153 ASSAY OF PSA TOTAL: CPT

## 2025-06-17 PROCEDURE — 36415 COLL VENOUS BLD VENIPUNCTURE: CPT

## 2025-06-17 PROCEDURE — 85025 COMPLETE CBC W/AUTO DIFF WBC: CPT

## 2025-06-18 DIAGNOSIS — C61 MALIGNANT NEOPLASM OF PROSTATE: ICD-10-CM

## 2025-06-18 LAB — PSA SERPL-MCNC: 50.5 NG/ML

## 2025-06-19 ENCOUNTER — OUTPATIENT (OUTPATIENT)
Dept: OUTPATIENT SERVICES | Facility: HOSPITAL | Age: 75
LOS: 1 days | End: 2025-06-19
Payer: MEDICARE

## 2025-06-19 ENCOUNTER — OUTPATIENT (OUTPATIENT)
Dept: OUTPATIENT SERVICES | Facility: HOSPITAL | Age: 75
LOS: 1 days | End: 2025-06-19

## 2025-06-19 ENCOUNTER — APPOINTMENT (OUTPATIENT)
Age: 75
End: 2025-06-19
Payer: MEDICARE

## 2025-06-19 ENCOUNTER — APPOINTMENT (OUTPATIENT)
Age: 75
End: 2025-06-19

## 2025-06-19 VITALS
OXYGEN SATURATION: 99 % | RESPIRATION RATE: 16 BRPM | SYSTOLIC BLOOD PRESSURE: 95 MMHG | DIASTOLIC BLOOD PRESSURE: 62 MMHG | HEIGHT: 66.5 IN | HEART RATE: 107 BPM | WEIGHT: 156 LBS | BODY MASS INDEX: 24.78 KG/M2 | TEMPERATURE: 97.8 F

## 2025-06-19 VITALS — SYSTOLIC BLOOD PRESSURE: 95 MMHG | TEMPERATURE: 98 F | HEART RATE: 107 BPM | DIASTOLIC BLOOD PRESSURE: 62 MMHG

## 2025-06-19 DIAGNOSIS — Z90.49 ACQUIRED ABSENCE OF OTHER SPECIFIED PARTS OF DIGESTIVE TRACT: Chronic | ICD-10-CM

## 2025-06-19 DIAGNOSIS — C61 MALIGNANT NEOPLASM OF PROSTATE: ICD-10-CM

## 2025-06-19 DIAGNOSIS — N21.0 CALCULUS IN BLADDER: Chronic | ICD-10-CM

## 2025-06-19 PROCEDURE — 99215 OFFICE O/P EST HI 40 MIN: CPT

## 2025-06-19 PROCEDURE — G2211 COMPLEX E/M VISIT ADD ON: CPT

## 2025-06-19 PROCEDURE — 96402 CHEMO HORMON ANTINEOPL SQ/IM: CPT

## 2025-06-19 RX ORDER — CYCLOBENZAPRINE HYDROCHLORIDE 5 MG/1
5 TABLET, FILM COATED ORAL
Refills: 0 | Status: ACTIVE | COMMUNITY

## 2025-06-19 RX ORDER — LEUPROLIDE 42 MG/.37G
22.5 INJECTION, EMULSION SUBCUTANEOUS ONCE
Refills: 0 | Status: COMPLETED | OUTPATIENT
Start: 2025-06-19 | End: 2025-06-19

## 2025-06-19 RX ADMIN — LEUPROLIDE 22.5 MILLIGRAM(S): 42 INJECTION, EMULSION SUBCUTANEOUS at 11:51

## 2025-06-23 ENCOUNTER — APPOINTMENT (OUTPATIENT)
Dept: UROLOGY | Facility: CLINIC | Age: 75
End: 2025-06-23
Payer: MEDICARE

## 2025-06-23 ENCOUNTER — APPOINTMENT (OUTPATIENT)
Age: 75
End: 2025-06-23

## 2025-06-23 VITALS
DIASTOLIC BLOOD PRESSURE: 76 MMHG | HEART RATE: 94 BPM | WEIGHT: 156 LBS | RESPIRATION RATE: 18 BRPM | OXYGEN SATURATION: 98 % | BODY MASS INDEX: 30.63 KG/M2 | HEIGHT: 60 IN | SYSTOLIC BLOOD PRESSURE: 115 MMHG

## 2025-06-23 PROCEDURE — G2211 COMPLEX E/M VISIT ADD ON: CPT

## 2025-06-23 PROCEDURE — 99214 OFFICE O/P EST MOD 30 MIN: CPT

## 2025-06-25 ENCOUNTER — OUTPATIENT (OUTPATIENT)
Dept: OUTPATIENT SERVICES | Facility: HOSPITAL | Age: 75
LOS: 1 days | End: 2025-06-25
Payer: MEDICARE

## 2025-06-25 ENCOUNTER — RESULT REVIEW (OUTPATIENT)
Age: 75
End: 2025-06-25

## 2025-06-25 DIAGNOSIS — N21.0 CALCULUS IN BLADDER: Chronic | ICD-10-CM

## 2025-06-25 DIAGNOSIS — Z90.49 ACQUIRED ABSENCE OF OTHER SPECIFIED PARTS OF DIGESTIVE TRACT: Chronic | ICD-10-CM

## 2025-06-25 DIAGNOSIS — C61 MALIGNANT NEOPLASM OF PROSTATE: ICD-10-CM

## 2025-06-25 DIAGNOSIS — Z00.8 ENCOUNTER FOR OTHER GENERAL EXAMINATION: ICD-10-CM

## 2025-06-25 PROCEDURE — 72147 MRI CHEST SPINE W/DYE: CPT | Mod: 26

## 2025-06-25 PROCEDURE — 72149 MRI LUMBAR SPINE W/DYE: CPT | Mod: 26

## 2025-06-25 PROCEDURE — 72147 MRI CHEST SPINE W/DYE: CPT

## 2025-06-25 PROCEDURE — A9579: CPT

## 2025-06-25 PROCEDURE — 72149 MRI LUMBAR SPINE W/DYE: CPT

## 2025-06-26 DIAGNOSIS — C61 MALIGNANT NEOPLASM OF PROSTATE: ICD-10-CM

## 2025-07-03 ENCOUNTER — APPOINTMENT (OUTPATIENT)
Age: 75
End: 2025-07-03
Payer: MEDICARE

## 2025-07-03 VITALS
BODY MASS INDEX: 30.86 KG/M2 | SYSTOLIC BLOOD PRESSURE: 94 MMHG | WEIGHT: 158 LBS | HEART RATE: 118 BPM | TEMPERATURE: 98.3 F | RESPIRATION RATE: 16 BRPM | DIASTOLIC BLOOD PRESSURE: 61 MMHG

## 2025-07-03 PROBLEM — Z80.6 FAMILY HISTORY OF LEUKEMIA: Status: ACTIVE | Noted: 2025-07-03

## 2025-07-03 PROBLEM — Z80.3 FAMILY HISTORY OF MALIGNANT NEOPLASM OF BREAST: Status: ACTIVE | Noted: 2025-06-23

## 2025-07-03 PROBLEM — Z81.8 FAMILY HISTORY OF DEMENTIA: Status: ACTIVE | Noted: 2025-07-03

## 2025-07-03 PROCEDURE — G2211 COMPLEX E/M VISIT ADD ON: CPT

## 2025-07-03 PROCEDURE — 99215 OFFICE O/P EST HI 40 MIN: CPT

## 2025-07-05 LAB
ALBUMIN SERPL ELPH-MCNC: 4.2 G/DL
ALP BLD-CCNC: 354 U/L
ALT SERPL-CCNC: 30 U/L
ANION GAP SERPL CALC-SCNC: 15 MMOL/L
AST SERPL-CCNC: 38 U/L
AUTO BASOPHILS #: 0.05 K/UL
AUTO BASOPHILS %: 0.5 %
AUTO EOSINOPHILS #: 0.21 K/UL
AUTO EOSINOPHILS %: 2.1 %
AUTO IMMATURE GRANULOCYTES #: 0.1 K/UL
AUTO LYMPHOCYTES #: 1.8 K/UL
AUTO LYMPHOCYTES %: 18.2 %
AUTO MONOCYTES #: 1.18 K/UL
AUTO MONOCYTES %: 11.9 %
AUTO NEUTROPHILS #: 6.54 K/UL
AUTO NEUTROPHILS %: 66.3 %
AUTO NRBC #: 0 K/UL
BILIRUB SERPL-MCNC: 0.6 MG/DL
BUN SERPL-MCNC: 24 MG/DL
CALCIUM SERPL-MCNC: 9.4 MG/DL
CHLORIDE SERPL-SCNC: 104 MMOL/L
CO2 SERPL-SCNC: 21 MMOL/L
CREAT SERPL-MCNC: 1.5 MG/DL
EGFRCR SERPLBLD CKD-EPI 2021: 49 ML/MIN/1.73M2
GLUCOSE SERPL-MCNC: 103 MG/DL
HCT VFR BLD CALC: 30.7 %
HGB BLD-MCNC: 10.5 G/DL
IMM GRANULOCYTES NFR BLD AUTO: 1 %
MAN DIFF?: NORMAL
MCHC RBC-ENTMCNC: 31.7 PG
MCHC RBC-ENTMCNC: 34.2 G/DL
MCV RBC AUTO: 92.7 FL
PLATELET # BLD AUTO: 333 K/UL
PMV BLD AUTO: 0 /100 WBCS
PMV BLD: 9.3 FL
POTASSIUM SERPL-SCNC: 4.3 MMOL/L
PROT SERPL-MCNC: 6.5 G/DL
PSA SERPL-MCNC: 63.8 NG/ML
RBC # BLD: 3.31 M/UL
RBC # FLD: 13.9 %
SODIUM SERPL-SCNC: 140 MMOL/L
WBC # FLD AUTO: 9.88 K/UL

## 2025-07-08 ENCOUNTER — APPOINTMENT (OUTPATIENT)
Dept: NEUROSURGERY | Facility: CLINIC | Age: 75
End: 2025-07-08
Payer: MEDICARE

## 2025-07-08 PROCEDURE — 99204 OFFICE O/P NEW MOD 45 MIN: CPT

## 2025-07-10 ENCOUNTER — APPOINTMENT (OUTPATIENT)
Age: 75
End: 2025-07-10

## 2025-07-21 ENCOUNTER — NON-APPOINTMENT (OUTPATIENT)
Age: 75
End: 2025-07-21

## 2025-07-21 ENCOUNTER — OUTPATIENT (OUTPATIENT)
Dept: OUTPATIENT SERVICES | Facility: HOSPITAL | Age: 75
LOS: 1 days | End: 2025-07-21
Payer: MEDICARE

## 2025-07-21 VITALS
HEIGHT: 67 IN | TEMPERATURE: 98 F | OXYGEN SATURATION: 98 % | SYSTOLIC BLOOD PRESSURE: 97 MMHG | WEIGHT: 154.98 LBS | DIASTOLIC BLOOD PRESSURE: 61 MMHG | RESPIRATION RATE: 18 BRPM | HEART RATE: 103 BPM

## 2025-07-21 DIAGNOSIS — Z90.49 ACQUIRED ABSENCE OF OTHER SPECIFIED PARTS OF DIGESTIVE TRACT: Chronic | ICD-10-CM

## 2025-07-21 DIAGNOSIS — N21.0 CALCULUS IN BLADDER: Chronic | ICD-10-CM

## 2025-07-21 DIAGNOSIS — Z01.818 ENCOUNTER FOR OTHER PREPROCEDURAL EXAMINATION: ICD-10-CM

## 2025-07-21 DIAGNOSIS — N32.89 OTHER SPECIFIED DISORDERS OF BLADDER: ICD-10-CM

## 2025-07-21 LAB
ALBUMIN SERPL ELPH-MCNC: 4 G/DL — SIGNIFICANT CHANGE UP (ref 3.5–5.2)
ALP SERPL-CCNC: 380 U/L — HIGH (ref 30–115)
ALT FLD-CCNC: 18 U/L — SIGNIFICANT CHANGE UP (ref 0–41)
ANION GAP SERPL CALC-SCNC: 17 MMOL/L — HIGH (ref 7–14)
APPEARANCE UR: ABNORMAL
AST SERPL-CCNC: 24 U/L — SIGNIFICANT CHANGE UP (ref 0–41)
BACTERIA # UR AUTO: ABNORMAL /HPF
BASOPHILS # BLD AUTO: 0.06 K/UL — SIGNIFICANT CHANGE UP (ref 0–0.2)
BASOPHILS NFR BLD AUTO: 0.7 % — SIGNIFICANT CHANGE UP (ref 0–1)
BILIRUB SERPL-MCNC: 0.5 MG/DL — SIGNIFICANT CHANGE UP (ref 0.2–1.2)
BILIRUB UR-MCNC: NEGATIVE — SIGNIFICANT CHANGE UP
BUN SERPL-MCNC: 23 MG/DL — HIGH (ref 10–20)
CALCIUM SERPL-MCNC: 9.2 MG/DL — SIGNIFICANT CHANGE UP (ref 8.4–10.5)
CHLORIDE SERPL-SCNC: 101 MMOL/L — SIGNIFICANT CHANGE UP (ref 98–110)
CO2 SERPL-SCNC: 21 MMOL/L — SIGNIFICANT CHANGE UP (ref 17–32)
COLOR SPEC: YELLOW — SIGNIFICANT CHANGE UP
CREAT SERPL-MCNC: 1.3 MG/DL — SIGNIFICANT CHANGE UP (ref 0.7–1.5)
DIFF PNL FLD: ABNORMAL
EGFR: 58 ML/MIN/1.73M2 — LOW
EGFR: 58 ML/MIN/1.73M2 — LOW
EOSINOPHIL # BLD AUTO: 0.16 K/UL — SIGNIFICANT CHANGE UP (ref 0–0.7)
EOSINOPHIL NFR BLD AUTO: 1.8 % — SIGNIFICANT CHANGE UP (ref 0–8)
EPI CELLS # UR: PRESENT
GLUCOSE SERPL-MCNC: 105 MG/DL — HIGH (ref 70–99)
GLUCOSE UR QL: NEGATIVE MG/DL — SIGNIFICANT CHANGE UP
HCT VFR BLD CALC: 32 % — LOW (ref 42–52)
HGB BLD-MCNC: 10.3 G/DL — LOW (ref 14–18)
IMM GRANULOCYTES NFR BLD AUTO: 1.3 % — HIGH (ref 0.1–0.3)
KETONES UR QL: ABNORMAL MG/DL
LEUKOCYTE ESTERASE UR-ACNC: ABNORMAL
LYMPHOCYTES # BLD AUTO: 1.6 K/UL — SIGNIFICANT CHANGE UP (ref 1.2–3.4)
LYMPHOCYTES # BLD AUTO: 18 % — LOW (ref 20.5–51.1)
MCHC RBC-ENTMCNC: 30.8 PG — SIGNIFICANT CHANGE UP (ref 27–31)
MCHC RBC-ENTMCNC: 32.2 G/DL — SIGNIFICANT CHANGE UP (ref 32–37)
MCV RBC AUTO: 95.8 FL — HIGH (ref 80–94)
MONOCYTES # BLD AUTO: 0.96 K/UL — HIGH (ref 0.1–0.6)
MONOCYTES NFR BLD AUTO: 10.8 % — HIGH (ref 1.7–9.3)
NEUTROPHILS # BLD AUTO: 6.01 K/UL — SIGNIFICANT CHANGE UP (ref 1.4–6.5)
NEUTROPHILS NFR BLD AUTO: 67.4 % — SIGNIFICANT CHANGE UP (ref 42.2–75.2)
NITRITE UR-MCNC: NEGATIVE — SIGNIFICANT CHANGE UP
NRBC BLD AUTO-RTO: 0 /100 WBCS — SIGNIFICANT CHANGE UP (ref 0–0)
PH UR: 6 — SIGNIFICANT CHANGE UP (ref 5–8)
PLATELET # BLD AUTO: 362 K/UL — SIGNIFICANT CHANGE UP (ref 130–400)
PMV BLD: 9.8 FL — SIGNIFICANT CHANGE UP (ref 7.4–10.4)
POTASSIUM SERPL-MCNC: 4.7 MMOL/L — SIGNIFICANT CHANGE UP (ref 3.5–5)
POTASSIUM SERPL-SCNC: 4.7 MMOL/L — SIGNIFICANT CHANGE UP (ref 3.5–5)
PROT SERPL-MCNC: 6.3 G/DL — SIGNIFICANT CHANGE UP (ref 6–8)
PROT UR-MCNC: 100 MG/DL
RBC # BLD: 3.34 M/UL — LOW (ref 4.7–6.1)
RBC # FLD: 13.2 % — SIGNIFICANT CHANGE UP (ref 11.5–14.5)
RBC CASTS # UR COMP ASSIST: 13 /HPF — SIGNIFICANT CHANGE UP (ref 0–4)
SODIUM SERPL-SCNC: 139 MMOL/L — SIGNIFICANT CHANGE UP (ref 135–146)
SP GR SPEC: 1.02 — SIGNIFICANT CHANGE UP (ref 1–1.03)
UROBILINOGEN FLD QL: 0.2 MG/DL — SIGNIFICANT CHANGE UP (ref 0.2–1)
WBC # BLD: 8.91 K/UL — SIGNIFICANT CHANGE UP (ref 4.8–10.8)
WBC # FLD AUTO: 8.91 K/UL — SIGNIFICANT CHANGE UP (ref 4.8–10.8)
WBC UR QL: 11 /HPF — HIGH (ref 0–5)

## 2025-07-21 PROCEDURE — 80053 COMPREHEN METABOLIC PANEL: CPT

## 2025-07-21 PROCEDURE — 81001 URINALYSIS AUTO W/SCOPE: CPT

## 2025-07-21 PROCEDURE — 87086 URINE CULTURE/COLONY COUNT: CPT

## 2025-07-21 PROCEDURE — 36415 COLL VENOUS BLD VENIPUNCTURE: CPT

## 2025-07-21 PROCEDURE — 99214 OFFICE O/P EST MOD 30 MIN: CPT | Mod: 25

## 2025-07-21 PROCEDURE — 85025 COMPLETE CBC W/AUTO DIFF WBC: CPT

## 2025-07-21 RX ORDER — ASPIRIN 325 MG
0 TABLET ORAL
Refills: 0 | DISCHARGE

## 2025-07-22 DIAGNOSIS — Z01.818 ENCOUNTER FOR OTHER PREPROCEDURAL EXAMINATION: ICD-10-CM

## 2025-07-22 DIAGNOSIS — N32.89 OTHER SPECIFIED DISORDERS OF BLADDER: ICD-10-CM

## 2025-07-22 LAB
CULTURE RESULTS: NO GROWTH — SIGNIFICANT CHANGE UP
SPECIMEN SOURCE: SIGNIFICANT CHANGE UP

## 2025-07-23 ENCOUNTER — OUTPATIENT (OUTPATIENT)
Dept: OUTPATIENT SERVICES | Facility: HOSPITAL | Age: 75
LOS: 1 days | End: 2025-07-23
Payer: MEDICARE

## 2025-07-23 ENCOUNTER — RESULT REVIEW (OUTPATIENT)
Age: 75
End: 2025-07-23

## 2025-07-23 DIAGNOSIS — N21.0 CALCULUS IN BLADDER: Chronic | ICD-10-CM

## 2025-07-23 DIAGNOSIS — C61 MALIGNANT NEOPLASM OF PROSTATE: ICD-10-CM

## 2025-07-23 DIAGNOSIS — Z90.49 ACQUIRED ABSENCE OF OTHER SPECIFIED PARTS OF DIGESTIVE TRACT: Chronic | ICD-10-CM

## 2025-07-23 LAB — GLUCOSE BLDC GLUCOMTR-MCNC: 101 MG/DL — HIGH (ref 70–99)

## 2025-07-23 PROCEDURE — 82962 GLUCOSE BLOOD TEST: CPT

## 2025-07-23 PROCEDURE — 78816 PET IMAGE W/CT FULL BODY: CPT | Mod: PS

## 2025-07-23 PROCEDURE — 78816 PET IMAGE W/CT FULL BODY: CPT | Mod: 26

## 2025-07-23 PROCEDURE — A9608: CPT

## 2025-07-24 DIAGNOSIS — C61 MALIGNANT NEOPLASM OF PROSTATE: ICD-10-CM

## 2025-07-25 ENCOUNTER — APPOINTMENT (OUTPATIENT)
Age: 75
End: 2025-07-25
Payer: MEDICARE

## 2025-07-25 VITALS
OXYGEN SATURATION: 99 % | WEIGHT: 148 LBS | HEIGHT: 60 IN | DIASTOLIC BLOOD PRESSURE: 66 MMHG | SYSTOLIC BLOOD PRESSURE: 99 MMHG | BODY MASS INDEX: 29.06 KG/M2 | HEART RATE: 106 BPM | TEMPERATURE: 97.8 F

## 2025-07-25 DIAGNOSIS — C80.1 SECONDARY MALIGNANT NEOPLASM OF BONE: ICD-10-CM

## 2025-07-25 DIAGNOSIS — C79.51 SECONDARY MALIGNANT NEOPLASM OF BONE: ICD-10-CM

## 2025-07-25 DIAGNOSIS — D63.0 ANEMIA IN NEOPLASTIC DISEASE: ICD-10-CM

## 2025-07-25 DIAGNOSIS — R21 RASH AND OTHER NONSPECIFIC SKIN ERUPTION: ICD-10-CM

## 2025-07-25 LAB
AUTO BASOPHILS #: 0.05 K/UL
AUTO BASOPHILS %: 0.4 %
AUTO EOSINOPHILS #: 0.14 K/UL
AUTO EOSINOPHILS %: 1.2 %
AUTO IMMATURE GRANULOCYTES #: 0.16 K/UL
AUTO LYMPHOCYTES #: 1.9 K/UL
AUTO LYMPHOCYTES %: 16 %
AUTO MONOCYTES #: 1.49 K/UL
AUTO MONOCYTES %: 12.5 %
AUTO NEUTROPHILS #: 8.15 K/UL
AUTO NEUTROPHILS %: 68.6 %
AUTO NRBC #: 0 K/UL
HCT VFR BLD CALC: 29.9 %
HGB BLD-MCNC: 10.3 G/DL
IMM GRANULOCYTES NFR BLD AUTO: 1.3 %
MAN DIFF?: NORMAL
MCHC RBC-ENTMCNC: 31.5 PG
MCHC RBC-ENTMCNC: 34.4 G/DL
MCV RBC AUTO: 91.4 FL
PLATELET # BLD AUTO: 357 K/UL
PMV BLD AUTO: 0 /100 WBCS
PMV BLD: 9.5 FL
RBC # BLD: 3.27 M/UL
RBC # FLD: 13.2 %
WBC # FLD AUTO: 11.89 K/UL

## 2025-07-25 PROCEDURE — G2211 COMPLEX E/M VISIT ADD ON: CPT

## 2025-07-25 PROCEDURE — 99215 OFFICE O/P EST HI 40 MIN: CPT

## 2025-07-25 RX ORDER — NYSTATIN 100000 [USP'U]/G
100000 CREAM TOPICAL TWICE DAILY
Qty: 2 | Refills: 1 | Status: ACTIVE | COMMUNITY
Start: 2025-07-25 | End: 1900-01-01

## 2025-07-28 LAB
ALBUMIN SERPL ELPH-MCNC: 3.9 G/DL
ALP BLD-CCNC: 439 U/L
ALT SERPL-CCNC: 19 U/L
ANION GAP SERPL CALC-SCNC: 20 MMOL/L
AST SERPL-CCNC: 36 U/L
BILIRUB SERPL-MCNC: 0.5 MG/DL
BUN SERPL-MCNC: 28 MG/DL
CALCIUM SERPL-MCNC: 8.5 MG/DL
CHLORIDE SERPL-SCNC: 101 MMOL/L
CO2 SERPL-SCNC: 16 MMOL/L
CREAT SERPL-MCNC: 1.4 MG/DL
EGFRCR SERPLBLD CKD-EPI 2021: 53 ML/MIN/1.73M2
GLUCOSE SERPL-MCNC: 101 MG/DL
HAV IGM SER QL: NONREACTIVE
HBV CORE IGM SER QL: NONREACTIVE
HBV SURFACE AG SER QL: NONREACTIVE
HCV AB SER QL: NONREACTIVE
HCV S/CO RATIO: 0.18 S/CO
POTASSIUM SERPL-SCNC: 4.5 MMOL/L
PROT SERPL-MCNC: 6.3 G/DL
PSA SERPL-MCNC: 87.4 NG/ML
SODIUM SERPL-SCNC: 137 MMOL/L
TESTOST SERPL-MCNC: <2.5 NG/DL

## 2025-08-01 ENCOUNTER — TRANSCRIPTION ENCOUNTER (OUTPATIENT)
Age: 75
End: 2025-08-01

## 2025-08-01 ENCOUNTER — APPOINTMENT (OUTPATIENT)
Dept: UROLOGY | Facility: HOSPITAL | Age: 75
End: 2025-08-01

## 2025-08-01 ENCOUNTER — OUTPATIENT (OUTPATIENT)
Dept: OUTPATIENT SERVICES | Facility: HOSPITAL | Age: 75
LOS: 1 days | Discharge: HOME | End: 2025-08-01
Payer: MEDICARE

## 2025-08-01 VITALS — SYSTOLIC BLOOD PRESSURE: 121 MMHG | HEART RATE: 96 BPM | RESPIRATION RATE: 20 BRPM | DIASTOLIC BLOOD PRESSURE: 80 MMHG

## 2025-08-01 VITALS
WEIGHT: 147.93 LBS | HEART RATE: 105 BPM | HEIGHT: 67 IN | OXYGEN SATURATION: 99 % | SYSTOLIC BLOOD PRESSURE: 105 MMHG | DIASTOLIC BLOOD PRESSURE: 73 MMHG | RESPIRATION RATE: 18 BRPM | TEMPERATURE: 98 F

## 2025-08-01 DIAGNOSIS — N32.89 OTHER SPECIFIED DISORDERS OF BLADDER: ICD-10-CM

## 2025-08-01 DIAGNOSIS — C61 MALIGNANT NEOPLASM OF PROSTATE: ICD-10-CM

## 2025-08-01 DIAGNOSIS — N21.0 CALCULUS IN BLADDER: Chronic | ICD-10-CM

## 2025-08-01 DIAGNOSIS — Z90.49 ACQUIRED ABSENCE OF OTHER SPECIFIED PARTS OF DIGESTIVE TRACT: Chronic | ICD-10-CM

## 2025-08-01 PROCEDURE — C2617: CPT

## 2025-08-01 PROCEDURE — C1758: CPT

## 2025-08-01 PROCEDURE — 52332 CYSTOSCOPY AND TREATMENT: CPT | Mod: LT

## 2025-08-01 PROCEDURE — C1769: CPT

## 2025-08-01 PROCEDURE — 52214 CYSTOSCOPY AND TREATMENT: CPT

## 2025-08-01 PROCEDURE — 52351 CYSTOURETERO & OR PYELOSCOPE: CPT | Mod: LT

## 2025-08-01 RX ORDER — MEROPENEM 1 G/30ML
1000 INJECTION INTRAVENOUS ONCE
Refills: 0 | Status: COMPLETED | OUTPATIENT
Start: 2025-08-01 | End: 2025-08-01

## 2025-08-01 RX ORDER — OLMESARTAN MEDOXOMIL 5 MG/1
2 TABLET, FILM COATED ORAL
Refills: 0 | DISCHARGE

## 2025-08-01 RX ORDER — VANCOMYCIN HCL IN 5 % DEXTROSE 1.5G/250ML
1000 PLASTIC BAG, INJECTION (ML) INTRAVENOUS ONCE
Refills: 0 | Status: COMPLETED | OUTPATIENT
Start: 2025-08-01 | End: 2025-08-01

## 2025-08-01 RX ORDER — SULFAMETHOXAZOLE/TRIMETHOPRIM 800-160 MG
1 TABLET ORAL
Qty: 4 | Refills: 0
Start: 2025-08-01 | End: 2025-08-02

## 2025-08-01 RX ADMIN — Medication 250 MILLIGRAM(S): at 11:25

## 2025-08-01 RX ADMIN — MEROPENEM 100 MILLIGRAM(S): 1 INJECTION INTRAVENOUS at 11:20

## 2025-08-04 ENCOUNTER — NON-APPOINTMENT (OUTPATIENT)
Age: 75
End: 2025-08-04

## 2025-08-06 DIAGNOSIS — Z85.46 PERSONAL HISTORY OF MALIGNANT NEOPLASM OF PROSTATE: ICD-10-CM

## 2025-08-06 DIAGNOSIS — N40.0 BENIGN PROSTATIC HYPERPLASIA WITHOUT LOWER URINARY TRACT SYMPTOMS: ICD-10-CM

## 2025-08-06 DIAGNOSIS — N13.30 UNSPECIFIED HYDRONEPHROSIS: ICD-10-CM

## 2025-08-06 DIAGNOSIS — N32.89 OTHER SPECIFIED DISORDERS OF BLADDER: ICD-10-CM

## 2025-08-06 DIAGNOSIS — I10 ESSENTIAL (PRIMARY) HYPERTENSION: ICD-10-CM

## 2025-08-06 DIAGNOSIS — E78.00 PURE HYPERCHOLESTEROLEMIA, UNSPECIFIED: ICD-10-CM

## 2025-08-07 PROBLEM — Z15.01 ATM GENE MUTATION POSITIVE: Status: ACTIVE | Noted: 2025-08-07

## 2025-08-14 ENCOUNTER — APPOINTMENT (OUTPATIENT)
Age: 75
End: 2025-08-14
Payer: MEDICARE

## 2025-08-14 VITALS
TEMPERATURE: 98.4 F | WEIGHT: 145 LBS | HEART RATE: 107 BPM | DIASTOLIC BLOOD PRESSURE: 73 MMHG | HEIGHT: 60 IN | BODY MASS INDEX: 28.47 KG/M2 | SYSTOLIC BLOOD PRESSURE: 105 MMHG | OXYGEN SATURATION: 100 %

## 2025-08-14 DIAGNOSIS — G89.3 NEOPLASM RELATED PAIN (ACUTE) (CHRONIC): ICD-10-CM

## 2025-08-14 DIAGNOSIS — R97.21 RISING PSA FOLLOWING TREATMENT FOR MALIGNANT NEOPLASM OF PROSTATE: ICD-10-CM

## 2025-08-14 DIAGNOSIS — Z15.01 GENETIC SUSCEPTIBILITY TO MALIGNANT NEOPLASM OF BREAST: ICD-10-CM

## 2025-08-14 DIAGNOSIS — Z15.09 GENETIC SUSCEPTIBILITY TO MALIGNANT NEOPLASM OF BREAST: ICD-10-CM

## 2025-08-14 DIAGNOSIS — R97.20 ELEVATED PROSTATE, SPECIFIC ANTIGEN [PSA]: ICD-10-CM

## 2025-08-14 DIAGNOSIS — C79.51 MALIGNANT NEOPLASM OF PROSTATE: ICD-10-CM

## 2025-08-14 DIAGNOSIS — C61 MALIGNANT NEOPLASM OF PROSTATE: ICD-10-CM

## 2025-08-14 DIAGNOSIS — D64.9 ANEMIA, UNSPECIFIED: ICD-10-CM

## 2025-08-14 PROCEDURE — G2211 COMPLEX E/M VISIT ADD ON: CPT

## 2025-08-14 PROCEDURE — 99215 OFFICE O/P EST HI 40 MIN: CPT

## 2025-08-14 RX ORDER — ONDANSETRON 8 MG/1
8 TABLET, ORALLY DISINTEGRATING ORAL EVERY 8 HOURS
Qty: 90 | Refills: 0 | Status: ACTIVE | COMMUNITY
Start: 2025-08-14 | End: 1900-01-01

## 2025-08-15 PROBLEM — G89.3 CANCER RELATED PAIN: Status: ACTIVE | Noted: 2025-07-04

## 2025-08-15 PROBLEM — R97.21 RISING PSA FOLLOWING TREATMENT FOR MALIGNANT NEOPLASM OF PROSTATE: Status: ACTIVE | Noted: 2025-07-03

## 2025-08-19 ENCOUNTER — RESULT REVIEW (OUTPATIENT)
Age: 75
End: 2025-08-19

## 2025-08-19 ENCOUNTER — OUTPATIENT (OUTPATIENT)
Dept: OUTPATIENT SERVICES | Facility: HOSPITAL | Age: 75
LOS: 1 days | End: 2025-08-19
Payer: MEDICARE

## 2025-08-19 DIAGNOSIS — Z90.49 ACQUIRED ABSENCE OF OTHER SPECIFIED PARTS OF DIGESTIVE TRACT: Chronic | ICD-10-CM

## 2025-08-19 DIAGNOSIS — C61 MALIGNANT NEOPLASM OF PROSTATE: ICD-10-CM

## 2025-08-19 DIAGNOSIS — Z00.8 ENCOUNTER FOR OTHER GENERAL EXAMINATION: ICD-10-CM

## 2025-08-19 DIAGNOSIS — N21.0 CALCULUS IN BLADDER: Chronic | ICD-10-CM

## 2025-08-19 PROCEDURE — 79101 NUCLEAR RX IV ADMIN: CPT | Mod: 26

## 2025-08-19 PROCEDURE — A9607: CPT

## 2025-08-19 PROCEDURE — 79101 NUCLEAR RX IV ADMIN: CPT

## 2025-08-20 DIAGNOSIS — C61 MALIGNANT NEOPLASM OF PROSTATE: ICD-10-CM

## 2025-08-26 ENCOUNTER — APPOINTMENT (OUTPATIENT)
Dept: UROLOGY | Facility: CLINIC | Age: 75
End: 2025-08-26
Payer: MEDICARE

## 2025-08-26 VITALS
WEIGHT: 145 LBS | SYSTOLIC BLOOD PRESSURE: 93 MMHG | HEART RATE: 112 BPM | DIASTOLIC BLOOD PRESSURE: 63 MMHG | HEIGHT: 60 IN | BODY MASS INDEX: 28.47 KG/M2

## 2025-08-26 DIAGNOSIS — N13.8 BENIGN PROSTATIC HYPERPLASIA WITH LOWER URINARY TRACT SYMPMS: ICD-10-CM

## 2025-08-26 DIAGNOSIS — C79.51 MALIGNANT NEOPLASM OF PROSTATE: ICD-10-CM

## 2025-08-26 DIAGNOSIS — C61 MALIGNANT NEOPLASM OF PROSTATE: ICD-10-CM

## 2025-08-26 DIAGNOSIS — N13.30 UNSPECIFIED HYDRONEPHROSIS: ICD-10-CM

## 2025-08-26 DIAGNOSIS — N40.1 BENIGN PROSTATIC HYPERPLASIA WITH LOWER URINARY TRACT SYMPMS: ICD-10-CM

## 2025-08-26 LAB
BILIRUB UR QL STRIP: NORMAL
COLLECTION METHOD: NORMAL
GLUCOSE UR-MCNC: NORMAL
HCG UR QL: 0.2 EU/DL
HGB UR QL STRIP.AUTO: NORMAL
KETONES UR-MCNC: NORMAL
LEUKOCYTE ESTERASE UR QL STRIP: NORMAL
NITRITE UR QL STRIP: NORMAL
PH UR STRIP: 5.5
PROT UR STRIP-MCNC: 30
SP GR UR STRIP: 1.01

## 2025-08-26 PROCEDURE — 99214 OFFICE O/P EST MOD 30 MIN: CPT

## 2025-08-26 PROCEDURE — G2211 COMPLEX E/M VISIT ADD ON: CPT

## 2025-08-26 PROCEDURE — 81003 URINALYSIS AUTO W/O SCOPE: CPT | Mod: QW

## 2025-09-10 ENCOUNTER — APPOINTMENT (OUTPATIENT)
Age: 75
End: 2025-09-10

## 2025-09-10 LAB
ALBUMIN SERPL ELPH-MCNC: 4.1 G/DL
ALP BLD-CCNC: 1071 U/L
ALT SERPL-CCNC: 22 U/L
ANION GAP SERPL CALC-SCNC: 13 MMOL/L
AST SERPL-CCNC: 34 U/L
AUTO BASOPHILS #: 0.04 K/UL
AUTO BASOPHILS %: 0.7 %
AUTO EOSINOPHILS #: 0.11 K/UL
AUTO EOSINOPHILS %: 1.8 %
AUTO IMMATURE GRANULOCYTES #: 0.08 K/UL
AUTO LYMPHOCYTES #: 0.92 K/UL
AUTO LYMPHOCYTES %: 15.4 %
AUTO MONOCYTES #: 0.86 K/UL
AUTO MONOCYTES %: 14.4 %
AUTO NEUTROPHILS #: 3.98 K/UL
AUTO NEUTROPHILS %: 66.4 %
AUTO NRBC #: 0 K/UL
BILIRUB SERPL-MCNC: 0.4 MG/DL
BUN SERPL-MCNC: 18 MG/DL
CALCIUM SERPL-MCNC: 8.5 MG/DL
CHLORIDE SERPL-SCNC: 108 MMOL/L
CO2 SERPL-SCNC: 19 MMOL/L
CREAT SERPL-MCNC: 1 MG/DL
EGFRCR SERPLBLD CKD-EPI 2021: 79 ML/MIN/1.73M2
GLUCOSE SERPL-MCNC: 90 MG/DL
HCT VFR BLD CALC: 28.3 %
HGB BLD-MCNC: 9 G/DL
IMM GRANULOCYTES NFR BLD AUTO: 1.3 %
MAN DIFF?: NORMAL
MCHC RBC-ENTMCNC: 29 PG
MCHC RBC-ENTMCNC: 31.8 G/DL
MCV RBC AUTO: 91.3 FL
PLATELET # BLD AUTO: 195 K/UL
PMV BLD AUTO: 0 /100 WBCS
PMV BLD: 9.1 FL
POTASSIUM SERPL-SCNC: 4.7 MMOL/L
PROT SERPL-MCNC: 6 G/DL
RBC # BLD: 3.1 M/UL
RBC # FLD: 15.3 %
SODIUM SERPL-SCNC: 140 MMOL/L
WBC # FLD AUTO: 5.99 K/UL

## 2025-09-11 ENCOUNTER — APPOINTMENT (OUTPATIENT)
Age: 75
End: 2025-09-11

## 2025-09-15 ENCOUNTER — NON-APPOINTMENT (OUTPATIENT)
Age: 75
End: 2025-09-15

## 2025-09-16 ENCOUNTER — APPOINTMENT (OUTPATIENT)
Age: 75
End: 2025-09-16
Payer: MEDICARE

## 2025-09-16 VITALS
TEMPERATURE: 97.7 F | OXYGEN SATURATION: 100 % | BODY MASS INDEX: 27.48 KG/M2 | SYSTOLIC BLOOD PRESSURE: 107 MMHG | HEIGHT: 60 IN | WEIGHT: 140 LBS | DIASTOLIC BLOOD PRESSURE: 72 MMHG | HEART RATE: 99 BPM

## 2025-09-16 DIAGNOSIS — Z15.01 GENETIC SUSCEPTIBILITY TO MALIGNANT NEOPLASM OF BREAST: ICD-10-CM

## 2025-09-16 DIAGNOSIS — Z15.09 GENETIC SUSCEPTIBILITY TO MALIGNANT NEOPLASM OF BREAST: ICD-10-CM

## 2025-09-16 DIAGNOSIS — G89.3 NEOPLASM RELATED PAIN (ACUTE) (CHRONIC): ICD-10-CM

## 2025-09-16 DIAGNOSIS — C61 MALIGNANT NEOPLASM OF PROSTATE: ICD-10-CM

## 2025-09-16 DIAGNOSIS — C79.51 MALIGNANT NEOPLASM OF PROSTATE: ICD-10-CM

## 2025-09-16 DIAGNOSIS — D64.9 ANEMIA, UNSPECIFIED: ICD-10-CM

## 2025-09-16 PROCEDURE — 99215 OFFICE O/P EST HI 40 MIN: CPT

## 2025-09-16 PROCEDURE — G2211 COMPLEX E/M VISIT ADD ON: CPT

## 2025-09-16 RX ORDER — OXYCODONE 5 MG/1
5 TABLET ORAL
Qty: 60 | Refills: 0 | Status: ACTIVE | COMMUNITY
Start: 2025-09-16 | End: 1900-01-01

## 2025-09-16 RX ORDER — GABAPENTIN 100 MG/1
100 CAPSULE ORAL 3 TIMES DAILY
Qty: 90 | Refills: 0 | Status: ACTIVE | COMMUNITY
Start: 2025-09-16 | End: 1900-01-01

## 2025-09-17 ENCOUNTER — APPOINTMENT (OUTPATIENT)
Dept: CARDIOLOGY | Facility: CLINIC | Age: 75
End: 2025-09-17
Payer: MEDICARE

## 2025-09-17 ENCOUNTER — RESULT CHARGE (OUTPATIENT)
Age: 75
End: 2025-09-17

## 2025-09-17 VITALS
HEIGHT: 60 IN | WEIGHT: 140 LBS | BODY MASS INDEX: 27.48 KG/M2 | DIASTOLIC BLOOD PRESSURE: 70 MMHG | SYSTOLIC BLOOD PRESSURE: 116 MMHG

## 2025-09-17 DIAGNOSIS — I45.10 UNSPECIFIED RIGHT BUNDLE-BRANCH BLOCK: ICD-10-CM

## 2025-09-17 DIAGNOSIS — E78.5 HYPERLIPIDEMIA, UNSPECIFIED: ICD-10-CM

## 2025-09-17 DIAGNOSIS — I10 ESSENTIAL (PRIMARY) HYPERTENSION: ICD-10-CM

## 2025-09-17 DIAGNOSIS — I25.10 ATHEROSCLEROTIC HEART DISEASE OF NATIVE CORONARY ARTERY W/OUT ANGINA PECTORIS: ICD-10-CM

## 2025-09-17 LAB
AUTO BASOPHILS #: 0.02 K/UL
AUTO BASOPHILS %: 0.4 %
AUTO EOSINOPHILS #: 0.13 K/UL
AUTO EOSINOPHILS %: 2.4 %
AUTO IMMATURE GRANULOCYTES #: 0.09 K/UL
AUTO LYMPHOCYTES #: 0.83 K/UL
AUTO LYMPHOCYTES %: 15.1 %
AUTO MONOCYTES #: 0.7 K/UL
AUTO MONOCYTES %: 12.7 %
AUTO NEUTROPHILS #: 3.74 K/UL
AUTO NEUTROPHILS %: 67.8 %
AUTO NRBC #: 0 K/UL
HCT VFR BLD CALC: 27.4 %
HGB BLD-MCNC: 8.8 G/DL
IMM GRANULOCYTES NFR BLD AUTO: 1.6 %
MAN DIFF?: NORMAL
MCHC RBC-ENTMCNC: 29.1 PG
MCHC RBC-ENTMCNC: 32.1 G/DL
MCV RBC AUTO: 90.7 FL
PLATELET # BLD AUTO: 148 K/UL
PMV BLD AUTO: 0 /100 WBCS
PMV BLD: 9.5 FL
PSA SERPL-MCNC: 99.5 NG/ML
RBC # BLD: 3.02 M/UL
RBC # FLD: 15.7 %
WBC # FLD AUTO: 5.51 K/UL

## 2025-09-17 PROCEDURE — 93306 TTE W/DOPPLER COMPLETE: CPT

## 2025-09-17 PROCEDURE — 93000 ELECTROCARDIOGRAM COMPLETE: CPT

## 2025-09-17 PROCEDURE — 99214 OFFICE O/P EST MOD 30 MIN: CPT

## 2025-09-17 PROCEDURE — G2211 COMPLEX E/M VISIT ADD ON: CPT
